# Patient Record
Sex: FEMALE | Race: ASIAN | NOT HISPANIC OR LATINO
[De-identification: names, ages, dates, MRNs, and addresses within clinical notes are randomized per-mention and may not be internally consistent; named-entity substitution may affect disease eponyms.]

---

## 2017-04-12 ENCOUNTER — RX ONLY (OUTPATIENT)
Age: 72
Setting detail: RX ONLY
End: 2017-04-12

## 2017-08-03 ENCOUNTER — APPOINTMENT (OUTPATIENT)
Dept: OTHER | Facility: IMAGING CENTER | Age: 72
End: 2017-08-03

## 2017-08-03 PROCEDURE — 97530 THERAPEUTIC ACTIVITIES: CPT | Performed by: FAMILY MEDICINE

## 2017-08-30 ENCOUNTER — APPOINTMENT (OUTPATIENT)
Dept: OTHER | Facility: IMAGING CENTER | Age: 72
End: 2017-08-30

## 2017-08-30 PROCEDURE — 97530 THERAPEUTIC ACTIVITIES: CPT | Performed by: FAMILY MEDICINE

## 2017-09-13 ENCOUNTER — APPOINTMENT (OUTPATIENT)
Dept: OTHER | Facility: IMAGING CENTER | Age: 72
End: 2017-09-13

## 2017-09-13 PROCEDURE — 97530 THERAPEUTIC ACTIVITIES: CPT | Performed by: FAMILY MEDICINE

## 2017-09-19 ENCOUNTER — HOSPITAL ENCOUNTER (OUTPATIENT)
Dept: HOSPITAL 8 - CFH | Age: 72
Discharge: HOME | End: 2017-09-19
Attending: INTERNAL MEDICINE
Payer: MEDICARE

## 2017-09-19 DIAGNOSIS — I10: Primary | ICD-10-CM

## 2017-09-19 PROCEDURE — 93306 TTE W/DOPPLER COMPLETE: CPT

## 2018-01-14 ENCOUNTER — HOSPITAL ENCOUNTER (OUTPATIENT)
Facility: MEDICAL CENTER | Age: 73
End: 2018-01-14
Attending: NURSE PRACTITIONER
Payer: MEDICARE

## 2018-01-14 ENCOUNTER — OFFICE VISIT (OUTPATIENT)
Dept: URGENT CARE | Facility: CLINIC | Age: 73
End: 2018-01-14
Payer: MEDICARE

## 2018-01-14 VITALS
WEIGHT: 119.4 LBS | BODY MASS INDEX: 24.07 KG/M2 | SYSTOLIC BLOOD PRESSURE: 130 MMHG | DIASTOLIC BLOOD PRESSURE: 90 MMHG | TEMPERATURE: 97.6 F | HEIGHT: 59 IN | RESPIRATION RATE: 14 BRPM | OXYGEN SATURATION: 97 % | HEART RATE: 90 BPM

## 2018-01-14 DIAGNOSIS — N30.01 ACUTE CYSTITIS WITH HEMATURIA: ICD-10-CM

## 2018-01-14 LAB
APPEARANCE UR: NORMAL
BILIRUB UR STRIP-MCNC: NEGATIVE MG/DL
COLOR UR AUTO: NORMAL
GLUCOSE UR STRIP.AUTO-MCNC: NEGATIVE MG/DL
KETONES UR STRIP.AUTO-MCNC: NEGATIVE MG/DL
LEUKOCYTE ESTERASE UR QL STRIP.AUTO: NORMAL
NITRITE UR QL STRIP.AUTO: POSITIVE
PH UR STRIP.AUTO: 7.5 [PH] (ref 5–8)
PROT UR QL STRIP: NEGATIVE MG/DL
RBC UR QL AUTO: NORMAL
SP GR UR STRIP.AUTO: 1
UROBILINOGEN UR STRIP-MCNC: NEGATIVE MG/DL

## 2018-01-14 PROCEDURE — 81002 URINALYSIS NONAUTO W/O SCOPE: CPT | Performed by: NURSE PRACTITIONER

## 2018-01-14 PROCEDURE — 87086 URINE CULTURE/COLONY COUNT: CPT

## 2018-01-14 PROCEDURE — 87186 SC STD MICRODIL/AGAR DIL: CPT

## 2018-01-14 PROCEDURE — 99214 OFFICE O/P EST MOD 30 MIN: CPT | Performed by: NURSE PRACTITIONER

## 2018-01-14 PROCEDURE — 87077 CULTURE AEROBIC IDENTIFY: CPT

## 2018-01-14 RX ORDER — NITROFURANTOIN 25; 75 MG/1; MG/1
100 CAPSULE ORAL 2 TIMES DAILY
Qty: 10 CAP | Refills: 0 | Status: SHIPPED | OUTPATIENT
Start: 2018-01-14 | End: 2018-01-19

## 2018-01-14 RX ORDER — AMOXICILLIN 500 MG/1
500 CAPSULE ORAL 3 TIMES DAILY
COMMUNITY
End: 2018-01-14

## 2018-01-14 NOTE — PROGRESS NOTES
Chief Complaint   Patient presents with   • Dysuria     x1day, burns to urinate, frequent urination, abdominal discomfort       HISTORY OF PRESENT ILLNESS: Patient is a 72 y.o. female who presents today due to three days of urinary burning, urgency, and frequency. Reports some associated pelvic pressure. Denies hematuria, fever, flank pain, N/V. Denies a history of pyelonephritis or kidney stones. Admits to a history of UTIs, this feels similar.     Patient Active Problem List    Diagnosis Date Noted   • Elevated blood sugar 06/27/2016   • Heart palpitations 11/03/2015   • Other chest pain 10/07/2015   • Glaucoma 03/17/2015   • Osteopenia 03/17/2015   • Back pain 09/23/2014   • History of shingles 09/23/2014   • Cervicalgia 07/02/2013   • GERD (gastroesophageal reflux disease) 03/22/2011   • Hyperlipidemia 03/22/2011   • Hypertension 03/22/2011       Allergies:Ciprofloxacin; Diclofenac; and Pcn [penicillins]    Current Outpatient Prescriptions Ordered in [facility]   Medication Sig Dispense Refill   • nitrofurantoin monohydr macro (MACROBID) 100 MG Cap Take 1 Cap by mouth 2 times a day for 5 days. 10 Cap 0   • latanoprost (XALATAN) 0.005 % SOLN Place 1 Drop in both eyes every evening. Lt eye daily      • Acetaminophen (TYLENOL PO) Take  by mouth.     • estradiol (ESTRACE) 1 MG TABS Take 1 mg by mouth every day.     • Coenzyme Q10 (CO Q 10 PO) Take  by mouth.     • Cholecalciferol (VITAMIN D-3 SUPER STRENGTH) 2000 UNIT TABS Take  by mouth.       No current Epic-ordered facility-administered medications on file.        Past Medical History:   Diagnosis Date   • Anesthesia     nausea   • Arthritis    • CATARACT     early stages   • Glaucoma     left eye   • Heart burn    • Hyperlipidemia    • Indigestion    • Pain    • Urinary bladder disorder        Social History   Substance Use Topics   • Smoking status: Never Smoker   • Smokeless tobacco: Never Used   • Alcohol use No       Family Status   Relation Status   • Mother  "   • Father      Family History   Problem Relation Age of Onset   • Heart Disease Mother      chf   • Cancer Father    • Stroke Father 70   • Diabetes Father        ROS:  Review of Systems   Constitutional: Negative for fever, chills, weight loss and malaise/fatigue.   HENT: Negative for ear pain, nosebleeds, congestion, sore throat and neck pain.    Eyes: Negative for vision changes.   Neuro: Negative for headache, sensory changes, weakness, seizure, LOC.   Cardiovascular: Negative for chest pain, palpitations, orthopnea and leg swelling.   Respiratory: Negative for cough, sputum production, shortness of breath and wheezing.   Gastrointestinal: Positive for lower abdominal pressure. Negative for abdominal pain, nausea, vomiting or diarrhea.   Genitourinary: Positive for dysuria, urgency and frequency. Negative for hematuria or flank pain.   Skin: Negative for rash, diaphoresis.     Exam:  Blood pressure 130/90, pulse 90, temperature 36.4 °C (97.6 °F), resp. rate 14, height 1.499 m (4' 11\"), weight 54.2 kg (119 lb 6.4 oz), SpO2 97 %.  General: well-nourished, well-developed female in NAD  Head: normocephalic, atraumatic  Eyes: PERRLA, no conjunctival injection, acuity grossly intact, lids normal.  Lymph: no cervical adenopathy. No supraclavicular adenopathy.   Neuro: alert and oriented. Cranial nerves 1-12 grossly intact. No sensory deficit.   Cardiovascular: regular rate and rhythm. No edema.  Pulmonary: no distress. Chest is symmetrical with respiration, no wheezes, crackles, or rhonchi.   Abdomen: soft, non-tender, no guarding, no hepatosplenomegaly. No CVA tenderness.   Musculoskeletal: no clubbing, appropriate muscle tone, gait is stable.  Skin: warm, dry, intact, no clubbing, no cyanosis, no rashes.   Psych: appropriate mood, affect, judgement.         Assessment/Plan:  1. Acute cystitis with hematuria  POCT Urinalysis    URINE CULTURE(NEW)    nitrofurantoin monohydr macro (MACROBID) 100 MG Cap "         Antibiotic as directed for suspected cysitis. Increase fluid intake. Urine sent for culture.   Supportive care, differential diagnoses, and indications for immediate follow-up discussed with patient.   Pathogenesis of diagnosis discussed including typical length and natural progression.   Instructed to return to clinic or nearest emergency department for any change in condition, further concerns, or worsening of symptoms.  Patient states understanding of the plan of care and discharge instructions.  Instructed to make an appointment, for follow up, with their primary care provider.        Please note that this dictation was created using voice recognition software. I have made every reasonable attempt to correct obvious errors, but I expect that there are errors of grammar and possibly content that I did not discover before finalizing the note.      HEVER Bettencourt.

## 2018-01-15 DIAGNOSIS — N30.01 ACUTE CYSTITIS WITH HEMATURIA: ICD-10-CM

## 2018-01-17 LAB
BACTERIA UR CULT: ABNORMAL
BACTERIA UR CULT: ABNORMAL
SIGNIFICANT IND 70042: ABNORMAL
SITE SITE: ABNORMAL
SOURCE SOURCE: ABNORMAL

## 2018-01-18 ENCOUNTER — TELEPHONE (OUTPATIENT)
Dept: URGENT CARE | Facility: PHYSICIAN GROUP | Age: 73
End: 2018-01-18

## 2018-01-18 NOTE — TELEPHONE ENCOUNTER
The patient was called for re-evaluation, urine culture positive for Escherichia coli, sensitive to Macrobid, she reports improvement, encouraged to call back to the clinic or return to clinic with any questions or concerns.

## 2018-02-25 ENCOUNTER — OFFICE VISIT (OUTPATIENT)
Dept: URGENT CARE | Facility: CLINIC | Age: 73
End: 2018-02-25
Payer: MEDICARE

## 2018-02-25 ENCOUNTER — HOSPITAL ENCOUNTER (OUTPATIENT)
Facility: MEDICAL CENTER | Age: 73
End: 2018-02-25
Attending: EMERGENCY MEDICINE
Payer: MEDICARE

## 2018-02-25 VITALS
SYSTOLIC BLOOD PRESSURE: 118 MMHG | HEART RATE: 90 BPM | RESPIRATION RATE: 14 BRPM | OXYGEN SATURATION: 98 % | WEIGHT: 119 LBS | BODY MASS INDEX: 23.99 KG/M2 | HEIGHT: 59 IN | DIASTOLIC BLOOD PRESSURE: 74 MMHG | TEMPERATURE: 97.5 F

## 2018-02-25 DIAGNOSIS — R30.0 DYSURIA: ICD-10-CM

## 2018-02-25 DIAGNOSIS — N30.01 ACUTE CYSTITIS WITH HEMATURIA: ICD-10-CM

## 2018-02-25 DIAGNOSIS — R82.90 ABNORMAL FINDING IN URINE: ICD-10-CM

## 2018-02-25 DIAGNOSIS — Z87.440 HISTORY OF RECURRENT UTI (URINARY TRACT INFECTION): ICD-10-CM

## 2018-02-25 LAB
APPEARANCE UR: NORMAL
BILIRUB UR STRIP-MCNC: NORMAL MG/DL
COLOR UR AUTO: NORMAL
GLUCOSE UR STRIP.AUTO-MCNC: NORMAL MG/DL
KETONES UR STRIP.AUTO-MCNC: NORMAL MG/DL
LEUKOCYTE ESTERASE UR QL STRIP.AUTO: NORMAL
NITRITE UR QL STRIP.AUTO: NORMAL
PH UR STRIP.AUTO: 6.5 [PH] (ref 5–8)
PROT UR QL STRIP: NORMAL MG/DL
RBC UR QL AUTO: NORMAL
SP GR UR STRIP.AUTO: 1
UROBILINOGEN UR STRIP-MCNC: 0.2 MG/DL

## 2018-02-25 PROCEDURE — 81002 URINALYSIS NONAUTO W/O SCOPE: CPT | Performed by: EMERGENCY MEDICINE

## 2018-02-25 PROCEDURE — 87086 URINE CULTURE/COLONY COUNT: CPT

## 2018-02-25 PROCEDURE — 87186 SC STD MICRODIL/AGAR DIL: CPT

## 2018-02-25 PROCEDURE — 87077 CULTURE AEROBIC IDENTIFY: CPT

## 2018-02-25 PROCEDURE — 99202 OFFICE O/P NEW SF 15 MIN: CPT | Performed by: EMERGENCY MEDICINE

## 2018-02-25 RX ORDER — CEFDINIR 300 MG/1
300 CAPSULE ORAL EVERY 12 HOURS
Qty: 10 CAP | Refills: 0 | Status: SHIPPED | OUTPATIENT
Start: 2018-02-25 | End: 2018-03-02

## 2018-02-25 ASSESSMENT — ENCOUNTER SYMPTOMS
CHANGE IN BOWEL HABIT: 0
FLANK PAIN: 0
VOMITING: 0
CHILLS: 0
NAUSEA: 0
ANOREXIA: 0
ABDOMINAL PAIN: 0
FEVER: 0
DIARRHEA: 0

## 2018-02-25 NOTE — PATIENT INSTRUCTIONS
You may also use over-the-counter phenazopyridine (AZO) as needed for urinary burning symptom relief. Use an oral probiotic daily, such as Culturelle, Align, or yogurt to reduce gastrointestinal symptoms.    Urinary Tract Infection  A urinary tract infection (UTI) can occur any place along the urinary tract. The tract includes the kidneys, ureters, bladder, and urethra. A type of germ called bacteria often causes a UTI. UTIs are often helped with antibiotic medicine.   HOME CARE   · If given, take antibiotics as told by your doctor. Finish them even if you start to feel better.  · Drink enough fluids to keep your pee (urine) clear or pale yellow.  · Avoid tea, drinks with caffeine, and bubbly (carbonated) drinks.  · Pee often. Avoid holding your pee in for a long time.  · Pee before and after having sex (intercourse).  · Wipe from front to back after you poop (bowel movement) if you are a woman. Use each tissue only once.  GET HELP RIGHT AWAY IF:   · You have back pain.  · You have lower belly (abdominal) pain.  · You have chills.  · You feel sick to your stomach (nauseous).  · You throw up (vomit).  · Your burning or discomfort with peeing does not go away.  · You have a fever.  · Your symptoms are not better in 3 days.  MAKE SURE YOU:   · Understand these instructions.  · Will watch your condition.  · Will get help right away if you are not doing well or get worse.     This information is not intended to replace advice given to you by your health care provider. Make sure you discuss any questions you have with your health care provider.     Document Released: 06/05/2009 Document Revised: 01/08/2016 Document Reviewed: 07/18/2013  [employer] Patient Education ©2016 [employer]

## 2018-02-25 NOTE — PROGRESS NOTES
Subjective:      Radha Kerr is a 72 y.o. female who presents with UTI (x a couple of days)            UTI   This is a recurrent problem. Episode onset: 3 days. The problem occurs daily. The problem has been unchanged. Associated symptoms include urinary symptoms. Pertinent negatives include no abdominal pain, anorexia, change in bowel habit, chills, fever, nausea, rash or vomiting. Nothing aggravates the symptoms. She has tried drinking for the symptoms. The treatment provided no relief.   Scheduled for urology visit due to recurrent UTI.    Review of Systems   Constitutional: Negative for chills and fever.   Gastrointestinal: Negative for abdominal pain, anorexia, change in bowel habit, diarrhea, nausea and vomiting.   Genitourinary: Positive for dysuria, frequency and urgency. Negative for flank pain and hematuria.        No vaginal discharge or bleeding.   Skin: Negative for rash.     PMH:  has a past medical history of Anesthesia; Arthritis; CATARACT; Glaucoma; Heart burn; Hyperlipidemia; Indigestion; Pain; and Urinary bladder disorder.  MEDS:   Current Outpatient Prescriptions:   •  D-MANNOSE PO, Take  by mouth., Disp: , Rfl:   •  cefdinir (OMNICEF) 300 MG Cap, Take 1 Cap by mouth every 12 hours for 5 days., Disp: 10 Cap, Rfl: 0  •  Acetaminophen (TYLENOL PO), Take  by mouth., Disp: , Rfl:   •  estradiol (ESTRACE) 1 MG TABS, Take 1 mg by mouth every day., Disp: , Rfl:   •  Coenzyme Q10 (CO Q 10 PO), Take  by mouth., Disp: , Rfl:   •  latanoprost (XALATAN) 0.005 % SOLN, Place 1 Drop in both eyes every evening. Lt eye daily , Disp: , Rfl:   •  Cholecalciferol (VITAMIN D-3 SUPER STRENGTH) 2000 UNIT TABS, Take  by mouth., Disp: , Rfl:   ALLERGIES:   Allergies   Allergen Reactions   • Ciprofloxacin    • Diclofenac    • Pcn [Penicillins]      SURGHX:   Past Surgical History:   Procedure Laterality Date   • COLONOSCOPY  12/17/13    2 polyps   • KNEE ARTHROSCOPY  1/9/2013    Performed by Seun Giles,  "M.D. at SURGERY [facility]   • MEDIAL MENISCECTOMY  1/9/2013    Performed by Seun Giles M.D. at SURGERY [facility]   • KNEE ARTHROSCOPY  7/13/2010    Performed by HAL WRIGHT at SURGERY SAME DAY [facility]   • MEDIAL MENISCECTOMY  7/13/2010    Performed by HAL WRIGHT at SURGERY SAME DAY [facility]   • ABDOMINAL HYSTERECTOMY TOTAL      gall bladder     SOCHX:  reports that she has never smoked. She has never used smokeless tobacco. She reports that she does not drink alcohol or use drugs.  FH: family history includes Cancer in her father; Diabetes in her father; Heart Disease in her mother; Stroke (age of onset: 70) in her father.       Objective:     /74   Pulse 90   Temp 36.4 °C (97.5 °F)   Resp 14   Ht 1.499 m (4' 11\")   Wt 54 kg (119 lb)   SpO2 98%   BMI 24.04 kg/m²      Physical Exam   Constitutional: She appears well-developed and well-nourished. She is cooperative. She does not have a sickly appearance. She does not appear ill. No distress.   Cardiovascular: Normal rate, regular rhythm and normal heart sounds.    Pulmonary/Chest: Effort normal and breath sounds normal.   Abdominal: Soft. She exhibits no distension. There is no tenderness. There is no CVA tenderness.   Neurological: She is alert.   Skin: Skin is warm and dry.   Psychiatric: She has a normal mood and affect.          Prior urine culture Escherichia coli resistant to ampicillin, trimethoprim/sulfamethoxazole.     Assessment/Plan:     1. Acute cystitis with hematuria  - cefdinir (OMNICEF) 300 MG Cap; Take 1 Cap by mouth every 12 hours for 5 days.  Dispense: 10 Cap; Refill: 0    2. Abnormal finding in urine   - Urine Culture; Future    3. Dysuria  Positive LE, positive blood- POCT Urinalysis    4. History of recurrent UTI (urinary tract infection)    "

## 2018-02-28 ENCOUNTER — TELEPHONE (OUTPATIENT)
Dept: URGENT CARE | Facility: CLINIC | Age: 73
End: 2018-02-28

## 2018-02-28 NOTE — TELEPHONE ENCOUNTER
Please notify patient of positive urine culture; UTI should resolve with current medication.  F/U PCP if not resolving.

## 2018-03-05 ENCOUNTER — TELEPHONE (OUTPATIENT)
Dept: URGENT CARE | Facility: CLINIC | Age: 73
End: 2018-03-05

## 2018-03-21 ENCOUNTER — HOSPITAL ENCOUNTER (OUTPATIENT)
Dept: LAB | Facility: MEDICAL CENTER | Age: 73
End: 2018-03-21
Attending: GENERAL PRACTICE
Payer: MEDICARE

## 2018-03-21 LAB
25(OH)D3 SERPL-MCNC: 48 NG/ML (ref 30–100)
ALBUMIN SERPL BCP-MCNC: 4.3 G/DL (ref 3.2–4.9)
ALBUMIN/GLOB SERPL: 1.5 G/DL
ALP SERPL-CCNC: 66 U/L (ref 30–99)
ALT SERPL-CCNC: 26 U/L (ref 2–50)
ANION GAP SERPL CALC-SCNC: 7 MMOL/L (ref 0–11.9)
APPEARANCE UR: ABNORMAL
AST SERPL-CCNC: 24 U/L (ref 12–45)
BACTERIA #/AREA URNS HPF: NEGATIVE /HPF
BASOPHILS # BLD AUTO: 0.9 % (ref 0–1.8)
BASOPHILS # BLD: 0.05 K/UL (ref 0–0.12)
BILIRUB SERPL-MCNC: 0.6 MG/DL (ref 0.1–1.5)
BILIRUB UR QL STRIP.AUTO: NEGATIVE
BUN SERPL-MCNC: 17 MG/DL (ref 8–22)
CALCIUM SERPL-MCNC: 9.8 MG/DL (ref 8.5–10.5)
CHLORIDE SERPL-SCNC: 105 MMOL/L (ref 96–112)
CHOLEST SERPL-MCNC: 205 MG/DL (ref 100–199)
CO2 SERPL-SCNC: 28 MMOL/L (ref 20–33)
COLOR UR: YELLOW
CORTIS SERPL-MCNC: 9.8 UG/DL (ref 0–23)
CREAT SERPL-MCNC: 0.7 MG/DL (ref 0.5–1.4)
CRP SERPL HS-MCNC: 1.5 MG/L (ref 0–7.5)
CULTURE IF INDICATED INDCX: YES UA CULTURE
EOSINOPHIL # BLD AUTO: 0.17 K/UL (ref 0–0.51)
EOSINOPHIL NFR BLD: 3 % (ref 0–6.9)
EPI CELLS #/AREA URNS HPF: NEGATIVE /HPF
ERYTHROCYTE [DISTWIDTH] IN BLOOD BY AUTOMATED COUNT: 44.8 FL (ref 35.9–50)
ERYTHROCYTE [SEDIMENTATION RATE] IN BLOOD BY WESTERGREN METHOD: 14 MM/HOUR (ref 0–30)
EST. AVERAGE GLUCOSE BLD GHB EST-MCNC: 120 MG/DL
ESTRADIOL SERPL-MCNC: 20 PG/ML
GLOBULIN SER CALC-MCNC: 2.9 G/DL (ref 1.9–3.5)
GLUCOSE SERPL-MCNC: 90 MG/DL (ref 65–99)
GLUCOSE UR STRIP.AUTO-MCNC: NEGATIVE MG/DL
HBA1C MFR BLD: 5.8 % (ref 0–5.6)
HCT VFR BLD AUTO: 42.1 % (ref 37–47)
HCYS SERPL-SCNC: 10.29 UMOL/L
HDLC SERPL-MCNC: 79 MG/DL
HGB BLD-MCNC: 13.9 G/DL (ref 12–16)
HYALINE CASTS #/AREA URNS LPF: ABNORMAL /LPF
IMM GRANULOCYTES # BLD AUTO: 0.01 K/UL (ref 0–0.11)
IMM GRANULOCYTES NFR BLD AUTO: 0.2 % (ref 0–0.9)
IRON SATN MFR SERPL: 37 % (ref 15–55)
IRON SERPL-MCNC: 133 UG/DL (ref 40–170)
KETONES UR STRIP.AUTO-MCNC: NEGATIVE MG/DL
LDLC SERPL CALC-MCNC: 111 MG/DL
LEUKOCYTE ESTERASE UR QL STRIP.AUTO: ABNORMAL
LYMPHOCYTES # BLD AUTO: 1.77 K/UL (ref 1–4.8)
LYMPHOCYTES NFR BLD: 30.7 % (ref 22–41)
MCH RBC QN AUTO: 30.3 PG (ref 27–33)
MCHC RBC AUTO-ENTMCNC: 33 G/DL (ref 33.6–35)
MCV RBC AUTO: 91.9 FL (ref 81.4–97.8)
MICRO URNS: ABNORMAL
MONOCYTES # BLD AUTO: 0.43 K/UL (ref 0–0.85)
MONOCYTES NFR BLD AUTO: 7.5 % (ref 0–13.4)
NEUTROPHILS # BLD AUTO: 3.33 K/UL (ref 2–7.15)
NEUTROPHILS NFR BLD: 57.7 % (ref 44–72)
NITRITE UR QL STRIP.AUTO: NEGATIVE
NRBC # BLD AUTO: 0 K/UL
NRBC BLD-RTO: 0 /100 WBC
PH UR STRIP.AUTO: 8.5 [PH]
PLATELET # BLD AUTO: 335 K/UL (ref 164–446)
PMV BLD AUTO: 10.1 FL (ref 9–12.9)
POTASSIUM SERPL-SCNC: 3.7 MMOL/L (ref 3.6–5.5)
PROGEST SERPL-MCNC: 0.12 NG/ML
PROT SERPL-MCNC: 7.2 G/DL (ref 6–8.2)
PROT UR QL STRIP: NEGATIVE MG/DL
RBC # BLD AUTO: 4.58 M/UL (ref 4.2–5.4)
RBC # URNS HPF: ABNORMAL /HPF
RBC UR QL AUTO: NEGATIVE
SODIUM SERPL-SCNC: 140 MMOL/L (ref 135–145)
SP GR UR STRIP.AUTO: 1.02
T3 SERPL-MCNC: 108.1 NG/DL (ref 60–181)
T3FREE SERPL-MCNC: 3.42 PG/ML (ref 2.4–4.2)
T4 FREE SERPL-MCNC: 0.78 NG/DL (ref 0.53–1.43)
T4 SERPL-MCNC: 6.4 UG/DL (ref 4–12)
TIBC SERPL-MCNC: 361 UG/DL (ref 250–450)
TRIGL SERPL-MCNC: 76 MG/DL (ref 0–149)
TSH SERPL DL<=0.005 MIU/L-ACNC: 1.38 UIU/ML (ref 0.38–5.33)
UROBILINOGEN UR STRIP.AUTO-MCNC: 0.2 MG/DL
VIT B12 SERPL-MCNC: 714 PG/ML (ref 211–911)
WBC # BLD AUTO: 5.8 K/UL (ref 4.8–10.8)
WBC #/AREA URNS HPF: ABNORMAL /HPF

## 2018-03-21 PROCEDURE — 84480 ASSAY TRIIODOTHYRONINE (T3): CPT

## 2018-03-21 PROCEDURE — 83550 IRON BINDING TEST: CPT

## 2018-03-21 PROCEDURE — 36415 COLL VENOUS BLD VENIPUNCTURE: CPT

## 2018-03-21 PROCEDURE — 84144 ASSAY OF PROGESTERONE: CPT

## 2018-03-21 PROCEDURE — 85652 RBC SED RATE AUTOMATED: CPT

## 2018-03-21 PROCEDURE — 83036 HEMOGLOBIN GLYCOSYLATED A1C: CPT

## 2018-03-21 PROCEDURE — 86141 C-REACTIVE PROTEIN HS: CPT

## 2018-03-21 PROCEDURE — 82670 ASSAY OF TOTAL ESTRADIOL: CPT

## 2018-03-21 PROCEDURE — 80061 LIPID PANEL: CPT

## 2018-03-21 PROCEDURE — 84443 ASSAY THYROID STIM HORMONE: CPT

## 2018-03-21 PROCEDURE — 83525 ASSAY OF INSULIN: CPT

## 2018-03-21 PROCEDURE — 81001 URINALYSIS AUTO W/SCOPE: CPT

## 2018-03-21 PROCEDURE — 82306 VITAMIN D 25 HYDROXY: CPT

## 2018-03-21 PROCEDURE — 85025 COMPLETE CBC W/AUTO DIFF WBC: CPT

## 2018-03-21 PROCEDURE — 82607 VITAMIN B-12: CPT

## 2018-03-21 PROCEDURE — 87086 URINE CULTURE/COLONY COUNT: CPT

## 2018-03-21 PROCEDURE — 83540 ASSAY OF IRON: CPT

## 2018-03-21 PROCEDURE — 84403 ASSAY OF TOTAL TESTOSTERONE: CPT

## 2018-03-21 PROCEDURE — 80053 COMPREHEN METABOLIC PANEL: CPT

## 2018-03-21 PROCEDURE — 84270 ASSAY OF SEX HORMONE GLOBUL: CPT

## 2018-03-21 PROCEDURE — 82533 TOTAL CORTISOL: CPT

## 2018-03-21 PROCEDURE — 83090 ASSAY OF HOMOCYSTEINE: CPT

## 2018-03-21 PROCEDURE — 84481 FREE ASSAY (FT-3): CPT

## 2018-03-21 PROCEDURE — 84439 ASSAY OF FREE THYROXINE: CPT

## 2018-03-22 LAB — INSULIN P FAST SERPL-ACNC: 6 UIU/ML (ref 3–19)

## 2018-03-23 LAB
BACTERIA UR CULT: NORMAL
SIGNIFICANT IND 70042: NORMAL
SITE SITE: NORMAL
SOURCE SOURCE: NORMAL

## 2018-03-24 LAB
SHBG SERPL-SCNC: 84 NMOL/L (ref 30–135)
TESTOST FREE SERPL-MCNC: 0.7 PG/ML (ref 0.6–3.8)
TESTOST SERPL-MCNC: 8 NG/DL (ref 5–32)

## 2018-03-29 ENCOUNTER — HOSPITAL ENCOUNTER (EMERGENCY)
Facility: MEDICAL CENTER | Age: 73
End: 2018-03-29
Attending: EMERGENCY MEDICINE
Payer: MEDICARE

## 2018-03-29 VITALS
TEMPERATURE: 96.8 F | RESPIRATION RATE: 16 BRPM | BODY MASS INDEX: 23.6 KG/M2 | WEIGHT: 117.06 LBS | HEIGHT: 59 IN | SYSTOLIC BLOOD PRESSURE: 180 MMHG | HEART RATE: 79 BPM | DIASTOLIC BLOOD PRESSURE: 78 MMHG | OXYGEN SATURATION: 97 %

## 2018-03-29 DIAGNOSIS — Z00.00 NORMAL PHYSICAL EXAM: ICD-10-CM

## 2018-03-29 DIAGNOSIS — Z71.1 WORRIED WELL: ICD-10-CM

## 2018-03-29 LAB — EKG IMPRESSION: NORMAL

## 2018-03-29 PROCEDURE — 99284 EMERGENCY DEPT VISIT MOD MDM: CPT

## 2018-03-29 PROCEDURE — 93005 ELECTROCARDIOGRAM TRACING: CPT | Performed by: EMERGENCY MEDICINE

## 2018-03-29 PROCEDURE — 93005 ELECTROCARDIOGRAM TRACING: CPT

## 2018-03-29 RX ORDER — ASPIRIN 81 MG/1
81 TABLET, CHEWABLE ORAL DAILY
COMMUNITY

## 2018-03-29 NOTE — ED PROVIDER NOTES
ED Provider Note    CHIEF COMPLAINT  Chief Complaint   Patient presents with   • Palpitations       HPI  Erikrey Mir Kerr is a 72 y.o. female who presents to the emergency department with concerns for possible palpitations. The patient says that she was shopping at [email or web address] today when she noticed that her apple watch said that her heart rate was going fast in the 120 to 1:30 range. The patient did not have any symptoms. She did not have any feelings of palpitations or racing heart rate. She denied any shortness of breath or lightheadedness. No chest pain. The patient says that she does not know how to take her pulse and therefore did not try to check her pulse to see the correlated with what the apple registered. She has been wearing the apple watch on a fairly regular basis. However she started to loosen the watch because it started to feel too tight on her wrists and she is worried that she was wearing the watch too tightly.    REVIEW OF SYSTEMS  See HPI for further details. All other systems are negative.     PAST MEDICAL HISTORY  Past Medical History:   Diagnosis Date   • Anesthesia     nausea   • Arthritis    • CATARACT     early stages   • Glaucoma     left eye   • Heart burn    • Hyperlipidemia    • Indigestion    • Pain    • Urinary bladder disorder        FAMILY HISTORY  Family History   Problem Relation Age of Onset   • Heart Disease Mother      chf   • Cancer Father    • Stroke Father 70   • Diabetes Father        SOCIAL HISTORY  Social History     Social History   • Marital status:      Spouse name: Man   • Number of children: 1   • Years of education: N/A     Occupational History   • [employer]     Social History Main Topics   • Smoking status: Never Smoker   • Smokeless tobacco: Never Used   • Alcohol use No   • Drug use: No   • Sexual activity: Not on file      Comment:  works for [employer].      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  Came from [facility] > 40 years ago       SURGICAL HISTORY  Past Surgical History:   Procedure Laterality Date   • COLONOSCOPY  12/17/13    2 polyps   • KNEE ARTHROSCOPY  1/9/2013    Performed by Seun Giles M.D. at SURGERY [facility] ORS   • MEDIAL MENISCECTOMY  1/9/2013    Performed by Seun Giles M.D. at SURGERY [facility] ORS   • KNEE ARTHROSCOPY  7/13/2010    Performed by HAL WRIGHT at SURGERY SAME DAY [facility] ORS   • MEDIAL MENISCECTOMY  7/13/2010    Performed by HAL WRIGHT at SURGERY SAME DAY [facility] ORS   • ABDOMINAL HYSTERECTOMY TOTAL      gall bladder       CURRENT MEDICATIONS  Home Medications     Reviewed by Bailey Elizabeth R.N. (Registered Nurse) on 03/29/18 at 1521  Med List Status: Partial   Medication Last Dose Status   Acetaminophen (TYLENOL PO) 6/27/2016 Active   aspirin (ASA) 81 MG Chew Tab chewable tablet  Active   Cholecalciferol (VITAMIN D-3 SUPER STRENGTH) 2000 UNIT TABS 3/29/2018 Active   latanoprost (XALATAN) 0.005 % SOLN 3/29/2018 Active                ALLERGIES  Allergies   Allergen Reactions   • Ciprofloxacin    • Diclofenac    • Pcn [Penicillins]        PHYSICAL EXAM  VITAL SIGNS: BP (!) 180/78   Pulse 81   Temp 36 °C (96.8 °F) (Temporal)   Resp 17   Ht 1.499 m (4' 11\")   Wt 53.1 kg (117 lb 1 oz)   SpO2 100%   BMI 23.64 kg/m²   Constitutional: Well developed, Well nourished, No acute distress, Non-toxic appearance.   HENT: Normocephalic, Atraumatic, Bilateral external ears normal, Oropharynx moist, No oral exudates, Nose normal.   Eyes: PERRLA, EOMI, Conjunctiva normal, No discharge.   Neck: Normal range of motion, No tenderness, Supple, No stridor.   Cardiovascular: Regular rate and rhythm, no audible murmur  Thorax & Lungs: Clear to auscultation bilaterally. No rales, rhonchi, or wheezing  Abdomen: Bowel sounds normal, Soft, No tenderness, No masses, No pulsatile masses.   Skin: Warm, Dry, No erythema, No rash.   Back: No " tenderness, No CVA tenderness.   Extremities: Intact distal pulses, No tenderness, No cyanosis, No clubbing.  Neurologic: Alert & oriented x 3, Normal motor function, Normal sensory function, No focal deficits noted.     EKG      RADIOLOGY/PROCEDURES  Results for orders placed or performed during the hospital encounter of 18   EKG (ER)   Result Value Ref Range    Report       [facility] Emergency Dept.    Test Date:  2018  Pt Name:    AGUS QUESADA                   Department: ER  MRN:        [id number]                      Room:  Gender:     Female                       Technician: [id number]  :        1945                   Requested By:ER TRIAGE PROTOCOL  Order #:    [id number]                    Reading MD: ASHWINI MCKOY MD    Measurements  Intervals                                Axis  Rate:       82                           P:          54  UT:         160                          QRS:        7  QRSD:       82                           T:          39  QT:         384  QTc:        449    Interpretive Statements  SINUS RHYTHM  EARLY PRECORDIAL R/S TRANSITION  Compared to ECG 10/31/2015 23:52:19  No significant changes    Electronically Signed On 3- 15:21:52 PDT by ASHWINI MCKOY MD         COURSE & MEDICAL DECISION MAKING  Pertinent Labs & Imaging studies reviewed. (See chart for details)    The patient presents today with concerns for palpitations. Here the patient is in normal sinus rhythm. EKG is unremarkable. The patient was apparently recently seen by her cardiologist. She underwent evaluation with a Holter monitor showed that apparently she had an extra beat occasionally but no concerning arrhythmias. Here the patient is in normal sinus rhythm with a normal heart rate. The patient was asymptomatic throughout the day today. Only concern was that the watch was reading a high number for heart rate. Today on exam she is wearing an apple lodged in the right wrist. It is  very loosely applied. I suspect that the watch was not reading accurately. The patient and her  think that this indeed may be the case. I have instructed the patient to wear the watch more firmly applied to the wrist. In addition we have taught the patient how to measure her pulse by palpating the radial pulse and counting the beats per minute. The patient will use this to see if an elevated heart rate on her apple watch correlates with an increased pulse. Should she have any irregular heart rhythm, increased heart rate above 100, or if she has chest pain or shortness of breath the patient will come to the emergency department for evaluation.    I do not feel that further evaluation would be beneficial today. Patient discharged home in stable condition.    FINAL IMPRESSION  1. Normal physical exam    2. Worried well              Electronically signed by: Tian Zurita, 3/29/2018 3:34 PM

## 2018-03-29 NOTE — DISCHARGE INSTRUCTIONS
Normal Exam, Adult  You were seen and examined today in our facility. Our caregiver found nothing wrong on the exam. If testing was done such as lab work or x-rays, they did not indicate enough wrong to suggest that treatment should be given. The caregiver then must decide after testing is finished if your concern is a physical problem or illness that needs treatment. Today no treatable problem was found. Even if reassurance was given, if you feel you are getting worse when you get home make sure you call back or return to our department.  For the protection of your privacy, test results can not be given over the phone. Make sure you receive the results of your test. Ask as to how these results are to be obtained if you have not been informed. It is your responsibility to obtain your test results.  Your condition can change over time. Sometimes it takes more than one visit to determine the cause of your symptoms. It is important that you monitor your condition for any changes.  SEEK IMMEDIATE MEDICAL CARE IF:  · You develop an oral temperature above 102° F (38.9° C), which lasts for more than 2 days, not controlled by medications. Only take over-the-counter or prescription medicines for pain, discomfort, or fever as directed by your caregiver.   · You develop a loss of appetite or start throwing up (vomiting).   · You develop a rash, cough, belly (abdominal) pain, earache, headache, or develop pain in neck, muscles, or joints.   · The problem or problems which brought you to our facility become worse or are a cause of more concern.   If we have told you today your exam and tests are normal, and a short while later you feel this is not right, please seek medical attention so you may be rechecked.  Document Released: 04/01/2002 Document Revised: 03/11/2013 Document Reviewed: 07/24/2009  [employer]® Patient Information ©2013 [employer].

## 2018-03-29 NOTE — ED TRIAGE NOTES
Pt ambulatory to triage c/o palpitations while walking & then again at rest.  Pt denies chest pain/SOB or any other symptoms.  States her HR was 130's during episode with no cardiac hx.  HR 80's in triage.   EKG done.

## 2018-05-10 ENCOUNTER — OFFICE VISIT (OUTPATIENT)
Dept: MEDICAL GROUP | Facility: MEDICAL CENTER | Age: 73
End: 2018-05-10
Payer: MEDICARE

## 2018-05-10 VITALS
HEIGHT: 59 IN | RESPIRATION RATE: 16 BRPM | BODY MASS INDEX: 23.56 KG/M2 | OXYGEN SATURATION: 98 % | DIASTOLIC BLOOD PRESSURE: 82 MMHG | HEART RATE: 69 BPM | SYSTOLIC BLOOD PRESSURE: 122 MMHG | TEMPERATURE: 98.3 F | WEIGHT: 116.84 LBS

## 2018-05-10 DIAGNOSIS — E78.5 DYSLIPIDEMIA: ICD-10-CM

## 2018-05-10 DIAGNOSIS — M85.80 OSTEOPENIA, UNSPECIFIED LOCATION: ICD-10-CM

## 2018-05-10 DIAGNOSIS — Z12.39 SCREENING FOR BREAST CANCER: ICD-10-CM

## 2018-05-10 DIAGNOSIS — M25.511 ACUTE PAIN OF RIGHT SHOULDER: ICD-10-CM

## 2018-05-10 DIAGNOSIS — Z12.31 SCREENING MAMMOGRAM, ENCOUNTER FOR: ICD-10-CM

## 2018-05-10 DIAGNOSIS — R73.9 ELEVATED BLOOD SUGAR: ICD-10-CM

## 2018-05-10 PROCEDURE — 99214 OFFICE O/P EST MOD 30 MIN: CPT | Performed by: FAMILY MEDICINE

## 2018-05-10 RX ORDER — DORZOLAMIDE HYDROCHLORIDE AND TIMOLOL MALEATE 20; 5 MG/ML; MG/ML
1 SOLUTION/ DROPS OPHTHALMIC 2 TIMES DAILY
COMMUNITY
End: 2019-03-27

## 2018-05-10 ASSESSMENT — PATIENT HEALTH QUESTIONNAIRE - PHQ9: CLINICAL INTERPRETATION OF PHQ2 SCORE: 0

## 2018-05-10 NOTE — PROGRESS NOTES
[facility]  Progress Note  Established Patient    Subjective:   Radha Kerr is a 72 y.o. female here today with a chief complaint of elevated cholesterol. The patient is alone.     Dyslipidemia  The patient has dyslipidemia with a ten-year ACC risk of 10%. She is maintained on baby aspirin. She declines statin therapy.    Osteopenia  Patient has osteopenia but her 2014 DEXA scan FRAX score does not warrant bisphosphonate.    Elevated blood sugar  Patient has a history of elevated blood sugar. Her most recent fasting glucose was normal, however, her hemoglobin A1c was slightly elevated. The patient does try to eat well and exercise.    Acute pain of right shoulder  The patient describes 2 weeks of 1-2 out of 10 severity, atraumatic right lateral shoulder pain worse with reaching. She has tried salon pas and tiger balm without benefit. She states it is getting better.      Current Outpatient Prescriptions on File Prior to Visit   Medication Sig Dispense Refill   • aspirin (ASA) 81 MG Chew Tab chewable tablet Take 81 mg by mouth every day.     • latanoprost (XALATAN) 0.005 % SOLN Place 1 Drop in both eyes every evening. Lt eye daily      • Cholecalciferol (VITAMIN D-3 SUPER STRENGTH) 2000 UNIT TABS Take  by mouth.     • Acetaminophen (TYLENOL PO) Take  by mouth.       No current facility-administered medications on file prior to visit.        Past Medical History:   Diagnosis Date   • Anesthesia     nausea   • Arthritis    • CATARACT     early stages   • GERD (gastroesophageal reflux disease)    • Glaucoma     left eye   • Hyperlipidemia    • Urinary bladder disorder        Allergies: Ciprofloxacin; Diclofenac; Myrbetriq [mirabegron]; Other drug; and Pcn [penicillins]    Surgical History:  has a past surgical history that includes abdominal hysterectomy total; knee arthroscopy (7/13/2010); medial meniscectomy (7/13/2010); knee arthroscopy (1/9/2013); medial meniscectomy (1/9/2013); and colonoscopy  "(12/17/13).    Family History: family history includes Cancer in her father; Diabetes in her father; Heart Disease in her mother; Stroke (age of onset: 70) in her father.    Social History:  reports that she has never smoked. She has never used smokeless tobacco. She reports that she does not drink alcohol or use drugs.    ROS: no CP, nausea.        Objective:     Vitals:    05/10/18 0919   BP: 122/82   Pulse: 69   Resp: 16   Temp: 36.8 °C (98.3 °F)   SpO2: 98%   Weight: 53 kg (116 lb 13.5 oz)   Height: 1.499 m (4' 11\")       Physical Exam:  General: alert in no apparent distress.   Cardio: regular rate and rhythm, no murmurs, rubs or gallops. 2+ radial pulses bilaterally.  Neuro: Sensation intact in bilateral upper extremities.  Resp: CTAB no w/r/r.   MSK: No tenderness to palpation of the right shoulder. No pain or weakness on internal rotation, external rotation or empty can testing of right shoulder.        Assessment and Plan:     1. Acute pain of right shoulder  Exam normal, 2010 X ray showing calcific tendinitis. Atraumatic. I don't think there is any need for an x-ray at the current time given the atraumatic and resolving nature of the complaint.  - continue [place] Balm and Salonpas.   - call if not resolved in 6 weeks as we would then pursue imaging.     2. Dyslipidemia  Recommended statin, patient declines.  - continue aspirin 81 mg daily.    3. Elevated blood sugar  Fasting blood sugar normal, A1c slightly elevated. Fasting blood sugars more sensitive for diabetes.  -Discussed diet and exercise.    4. Osteopenia, unspecified location  No indication for bisphosphonate.  - Continue calcium rich diet.        Followup: Return in about 6 months (around 11/10/2018), or if symptoms worsen or fail to improve, for Wellness Visit, Long.         "

## 2018-05-10 NOTE — ASSESSMENT & PLAN NOTE
The patient has dyslipidemia with a ten-year ACC risk of 10%. She is maintained on baby aspirin. She declines statin therapy.

## 2018-05-10 NOTE — ASSESSMENT & PLAN NOTE
The patient describes 2 weeks of 1-2 out of 10 severity, atraumatic right lateral shoulder pain worse with reaching. She has tried salon pas and tiger balm without benefit. She states it is getting better.

## 2018-05-10 NOTE — ASSESSMENT & PLAN NOTE
Patient has a history of elevated blood sugar. Her most recent fasting glucose was normal, however, her hemoglobin A1c was slightly elevated. The patient does try to eat well and exercise.

## 2018-05-16 NOTE — PROGRESS NOTES
Chief Complaint   Patient presents with   • Shoulder Pain     right side for about 2 weeks, worse today     Radha Kerr is a 72 y.o. female who usually sees Christian Green M.D. presents today to establish care at [facility] and to discuss right shoulder pain.    HPI:  Acute pain of right shoulder  Patient works as a  in [employer] and has been needing repetitive motions at right hand as she is right handed.  She was using Salonpas and tiger balm and has been able to     Dyslipidemia  Patient is        Current medicines (including changes today)  Current Outpatient Prescriptions   Medication Sig Dispense Refill   • ibuprofen (MOTRIN) 200 MG Tab Take 200 mg by mouth every 6 hours as needed.     • raNITidine (ZANTAC) 150 MG Tab Take 150 mg by mouth 2 times a day.     • Non Formulary Request Strontium, MWF     • Non Formulary Request alfaecal plus for bone strength     • dorzolamide-timolol (COSOPT) 22.3-6.8 MG/ML Solution Place 1 Drop in right eye 2 times a day.     • VITAMINS B1 B6 B12 PO Take  by mouth.     • D-MANNOSE PO Take 500 mg by mouth 2 Times a Day.     • aspirin (ASA) 81 MG Chew Tab chewable tablet Take 81 mg by mouth every day.     • latanoprost (XALATAN) 0.005 % SOLN Place 1 Drop in both eyes every evening. Lt eye daily      • Cholecalciferol (VITAMIN D-3 SUPER STRENGTH) 2000 UNIT TABS Take  by mouth.     • Acetaminophen (TYLENOL PO) Take  by mouth.       No current facility-administered medications for this visit.      She  has a past medical history of Anesthesia; Arthritis; CATARACT; GERD (gastroesophageal reflux disease); Glaucoma; Hyperlipidemia; and Urinary bladder disorder.  She  has a past surgical history that includes abdominal hysterectomy total; knee arthroscopy (7/13/2010); medial meniscectomy (7/13/2010); knee arthroscopy (1/9/2013); medial meniscectomy (1/9/2013); and colonoscopy (12/17/13).  Social History   Substance Use Topics   • Smoking status: Never Smoker   • Smokeless  tobacco: Never Used   • Alcohol use No     Family History   Problem Relation Age of Onset   • Heart Disease Mother      chf   • Cancer Father    • Stroke Father 70   • Diabetes Father      Family Status   Relation Status   • Mother    • Father      Social History     Social History Narrative    Works in Retail.  Son - Rambo.  Came from [facility] > 40 years ago     ROS  Constitutional: Negative for fever, chills, weight loss and malaise/fatigue.   HENT: Negative for ear pain, nosebleeds, congestion, sore throat and neck pain.    Eyes: Negative for blurred vision.   Respiratory: Negative for cough, sputum production, shortness of breath and wheezing.    Cardiovascular: Negative for chest pain, palpitations, orthopnea and leg swelling.   Gastrointestinal: Negative for heartburn, nausea, vomiting and abdominal pain.   Genitourinary: Negative for dysuria, urgency and frequency.   Musculoskeletal: Negative for myalgias, back pain, Positive for right joint pain.   Skin: Negative for rash and itching.   Neurological: Negative for dizziness, tingling, tremors, sensory change, focal weakness and headaches.   Endo/Heme/Allergies: Does not bruise/bleed easily.   Psychiatric/Behavioral: Negative for depression, anxiety, or memory loss.     All other systems reviewed and are negative except as in HPI.    Labs reviewed with patient:  Lab Results   Component Value Date/Time    WBC 5.8 2018 06:13 AM    WBC 6.9 2009 03:59 PM    RBC 4.58 2018 06:13 AM    RBC 4.63 2009 03:59 PM    HEMOGLOBIN 13.9 2018 06:13 AM    HEMATOCRIT 42.1 2018 06:13 AM    MCV 91.9 2018 06:13 AM    MCV 88 2009 03:59 PM    MCH 30.3 2018 06:13 AM    MCH 30.0 2009 03:59 PM    MCHC 33.0 (L) 2018 06:13 AM    MPV 10.1 2018 06:13 AM    NEUTSPOLYS 57.70 2018 06:13 AM    LYMPHOCYTES 30.70 2018 06:13 AM    MONOCYTES 7.50 2018 06:13 AM    EOSINOPHILS 3.00 2018  "06:13 AM    BASOPHILS 0.90 03/21/2018 06:13 AM      Lab Results   Component Value Date/Time    SODIUM 140 03/21/2018 06:13 AM    POTASSIUM 3.7 03/21/2018 06:13 AM    CHLORIDE 105 03/21/2018 06:13 AM    CO2 28 03/21/2018 06:13 AM    GLUCOSE 90 03/21/2018 06:13 AM    BUN 17 03/21/2018 06:13 AM    CREATININE 0.70 03/21/2018 06:13 AM    CREATININE 0.59 05/29/2009 03:59 PM    BUNCREATRAT 22 05/29/2009 03:59 PM    GLOMRATE >59 05/29/2009 03:59 PM    ALKPHOSPHAT 66 03/21/2018 06:13 AM    ASTSGOT 24 03/21/2018 06:13 AM    ALTSGPT 26 03/21/2018 06:13 AM    TBILIRUBIN 0.6 03/21/2018 06:13 AM     Lab Results   Component Value Date/Time    CHOLSTRLTOT 205 (H) 03/21/2018 06:13 AM    CHOLSTRLTOT 204 (H) 06/22/2016 06:32 AM     Lab Results   Component Value Date/Time     (H) 03/21/2018 06:13 AM     (H) 06/22/2016 06:32 AM     Lab Results   Component Value Date/Time    HDL 79 03/21/2018 06:13 AM    HDL 73 06/22/2016 06:32 AM     Lab Results   Component Value Date/Time    TRIGLYCERIDE 76 03/21/2018 06:13 AM    TRIGLYCERIDE 61 06/22/2016 06:32 AM     Lab Results   Component Value Date/Time    HBA1C 5.8 (H) 03/21/2018 06:13 AM    HBA1C 5.9 (H) 06/22/2016 06:32 AM     Lab Results   Component Value Date/Time    TSHULTRASEN 1.380 03/21/2018 06:13 AM     Lab Results   Component Value Date/Time    FREET4 0.78 03/21/2018 06:13 AM    FREET4 0.87 11/04/2015 06:20 AM     Lab Results   Component Value Date/Time    25HYDROXY 48 03/21/2018 06:13 AM     No components found for: VITB12  No results found for: FOLATE  Lab Results   Component Value Date/Time    25HYDROXY 48 03/21/2018 06:13 AM     No components found for: PROST  Lab Results   Component Value Date/Time    TESTOSTERONE 8 03/21/2018 06:13 AM        Objective:   Blood pressure 120/72, pulse 65, temperature 36.9 °C (98.5 °F), resp. rate 14, height 1.499 m (4' 11\"), weight 52 kg (114 lb 10.2 oz), SpO2 98 %. Body mass index is 23.15 kg/m².    Physical Exam:  Constitutional: " Alert, no distress.  Skin: Warm, dry, good turgor, no rashes in visible areas.  Eye: Equal, round and reactive, conjunctiva clear, lids normal.  ENMT: Lips without lesions, good dentition, oropharynx clear.  Neck: Trachea midline, no masses, no thyromegaly.  Respiratory: Unlabored respiratory effort, lungs clear to auscultation, no wheezes, no ronchi.  Cardiovascular: Normal S1, S2, no murmur, no edema.  Abdomen: Soft, non-tender, no masses, no hepatosplenomegaly.  Psych: Alert and oriented x3, normal affect and mood.    Assessment and Plan:   The following treatment plan was discussed  1. Cervicalgia      Acu placed in L KD, LR and R shanice barnes.  Will need to have 3 sessions of chiar yoga and either in between or after one month that she should have acupuncture follow up.     2. Acute pain of right shoulder      Acupuncture placed at right SI and shherlinda lai barnes.  Will recommend to use Cinnamon 2 tbsp in oatmeal daily or after her tumeric capsules are finished that we can use grounded tumeric as a spice in cooking to reduce inflammation.  Stop Algaecal and replace it with Boyle's Calcium with Magnesium gel cap to reduce the calcium and vitamin D over supplementation .   3. Dyslipidemia      Patient is recommened to use antiinflammatary diet and reduce the overall fat intake portion in her diet.  Since her job as a  in [place] that she couldn't have been sitting during work that she would be advised to ambulate as much as possible within her job environment constrains.  Patient is also encouraged to attend Food as Medicine introlecture.     Records requested.  Followup: Return in about 4 weeks (around 6/14/2018).    Spent 62 minutes in face-to-tace patient contact in which greater than 50% of the visit was spent in counseling and coordination of care including shoulder pain medication management options along with concerns and potential risks related to statin.  Answering patient questions about  GERD.  Discussing the nature of glaucoma.  Discussing in depth the importance of primary prevention of CVD.  Patient is also educated about lifestyle modifications which may improve her shoulder pain and GERD.  We also reviewed PMH, family history, and each of her chronic diagnoses in regards to current management, specialty physicians, symptom control, and future planning.  Please refer to assessment and plan/discussion/recommendations for additional details.          I have worked with consultants from the vendor as well as technical experts from [employer] to optimize the interface. I have made every reasonable attempt to correct obvious errors, but I expect that there are errors of grammar and possibly content that I did not discover before finalizing the note.

## 2018-05-17 ENCOUNTER — OFFICE VISIT (OUTPATIENT)
Dept: OTHER | Facility: IMAGING CENTER | Age: 73
End: 2018-05-17
Payer: MEDICARE

## 2018-05-17 VITALS
OXYGEN SATURATION: 98 % | BODY MASS INDEX: 23.11 KG/M2 | DIASTOLIC BLOOD PRESSURE: 72 MMHG | TEMPERATURE: 98.5 F | RESPIRATION RATE: 14 BRPM | SYSTOLIC BLOOD PRESSURE: 120 MMHG | HEIGHT: 59 IN | WEIGHT: 114.64 LBS | HEART RATE: 65 BPM

## 2018-05-17 DIAGNOSIS — M25.511 ACUTE PAIN OF RIGHT SHOULDER: ICD-10-CM

## 2018-05-17 DIAGNOSIS — M54.2 CERVICALGIA: ICD-10-CM

## 2018-05-17 DIAGNOSIS — E78.5 DYSLIPIDEMIA: ICD-10-CM

## 2018-05-17 PROCEDURE — 99215 OFFICE O/P EST HI 40 MIN: CPT | Performed by: FAMILY MEDICINE

## 2018-05-17 RX ORDER — IBUPROFEN 200 MG
200 TABLET ORAL EVERY 6 HOURS PRN
COMMUNITY
End: 2021-09-01

## 2018-05-17 RX ORDER — RANITIDINE 150 MG/1
150 TABLET ORAL 2 TIMES DAILY
COMMUNITY
End: 2020-04-15

## 2018-05-17 ASSESSMENT — PAIN SCALES - GENERAL: PAINLEVEL: 5=MODERATE PAIN

## 2018-05-17 NOTE — LETTER
[facility]  Mitchel Tesfaye D.O.  [address]  [place] [address]  Fax: [phone]   Authorization for Release/Disclosure of   Protected Health Information   Name: RADHA KERR : 1945 SSN: [id number]   Address: [address] [place] [address]  [place] [address] Phone:    [phone] (home) [phone] (work)   I authorize the entity listed below to release/disclose the PHI below to:   [facility]/Mitchel Tesfaye D.O. and Mitchel Tesfaye D.O.   Provider or Entity Name:     Address   City, State, Zip   Phone:      Fax:     Reason for request: continuity of care   Information to be released:    [  ] LAST COLONOSCOPY,  including any PATH REPORT and follow-up  [  ] LAST FIT/COLOGUARD RESULT [  ] LAST DEXA  [ x] LAST MAMMOGRAM  [  ] LAST PAP  [  ] LAST LABS [  ] RETINA EXAM REPORT  [  ] IMMUNIZATION RECORDS  [  ] Release all info      [  ] Check here and initial the line next to each item to release ALL health information INCLUDING  _____ Care and treatment for drug and / or alcohol abuse  _____ HIV testing, infection status, or AIDS  _____ Genetic Testing    DATES OF SERVICE OR TIME PERIOD TO BE DISCLOSED: _____________  I understand and acknowledge that:  * This Authorization may be revoked at any time by you in writing, except if your health information has already been used or disclosed.  * Your health information that will be used or disclosed as a result of you signing this authorization could be re-disclosed by the recipient. If this occurs, your re-disclosed health information may no longer be protected by State or Federal laws.  * You may refuse to sign this Authorization. Your refusal will not affect your ability to obtain treatment.  * This Authorization becomes effective upon signing and will  on (date) __________.      If no date is indicated, this Authorization will  one (1) year from the signature date.    Name: Radha Kerr    Signature:   Date:     2018          PLEASE FAX REQUESTED RECORDS BACK TO: [phone]

## 2018-05-17 NOTE — ASSESSMENT & PLAN NOTE
Patient works as a  in [employer] and has been needing repetitive motions at right hand as she is right handed.  She was using Salonpas and Tiger balm and has been able to have some reduction of pain but it was only manageable and she is wondering what else she can try to reduce her pain.  Her work is long hour and she is in need of repetitive motions at arm for her line of work.

## 2018-05-18 NOTE — ASSESSMENT & PLAN NOTE
Her neck has been having chronic pain at right upper part and the lower neck is typically fairly non-tender with the exception of when the shoulder is flared that at posterior aspect would trigger a more even right lower cervical left rotation restrictions.

## 2018-05-24 ENCOUNTER — APPOINTMENT (OUTPATIENT)
Dept: OTHER | Facility: IMAGING CENTER | Age: 73
End: 2018-05-24

## 2018-05-31 ENCOUNTER — APPOINTMENT (OUTPATIENT)
Dept: OTHER | Facility: IMAGING CENTER | Age: 73
End: 2018-05-31

## 2018-06-07 ENCOUNTER — OFFICE VISIT (OUTPATIENT)
Dept: OTHER | Facility: IMAGING CENTER | Age: 73
End: 2018-06-07
Payer: MEDICARE

## 2018-06-07 ENCOUNTER — APPOINTMENT (OUTPATIENT)
Dept: OTHER | Facility: IMAGING CENTER | Age: 73
End: 2018-06-07

## 2018-06-07 VITALS
HEIGHT: 59 IN | HEART RATE: 67 BPM | DIASTOLIC BLOOD PRESSURE: 76 MMHG | RESPIRATION RATE: 14 BRPM | SYSTOLIC BLOOD PRESSURE: 130 MMHG | WEIGHT: 116 LBS | BODY MASS INDEX: 23.39 KG/M2 | OXYGEN SATURATION: 98 % | TEMPERATURE: 98.7 F

## 2018-06-07 DIAGNOSIS — J01.00 SUBACUTE MAXILLARY SINUSITIS: ICD-10-CM

## 2018-06-07 PROCEDURE — 99214 OFFICE O/P EST MOD 30 MIN: CPT | Performed by: FAMILY MEDICINE

## 2018-06-07 RX ORDER — DOXYCYCLINE HYCLATE 100 MG
100 TABLET ORAL 2 TIMES DAILY
Qty: 20 TAB | Refills: 0 | Status: SHIPPED | OUTPATIENT
Start: 2018-06-07 | End: 2018-06-17

## 2018-06-07 ASSESSMENT — PAIN SCALES - GENERAL: PAINLEVEL: 3=SLIGHT PAIN

## 2018-06-08 NOTE — ASSESSMENT & PLAN NOTE
Patient for the last 2 days had noticed to have more issues in terms of frontal headache with left side predominantly at the temple area.  Upon further questioning patient reported for the last week and half and noticed to have increased yellow drainage with feeling of bilateral ear pressure but reports no ear popping sensation.  Incidentally she has started to use slippery elm yesterday and had experienced headache onset therefore she is wondering if that has something tied to slippery elm side effect.  About 1 week after the last appointment patient did have one episode of dizziness that seems to be transient to the above fraction of the second while she was going up the stairs but had not recurred since.  AAO-HNS score 3.

## 2018-06-08 NOTE — PATIENT INSTRUCTIONS
GI symptoms with viral infections, chemo or antibiotics could be assisted by Bifidobacterium Lactis with combination L. Acidophilus, L. Casei, Returi, Rhamnosus, Bulgaricus, GG, paracasei F19, or longum. Saccharomyces boulardii and Strptococus thermophilus  Can add L. Rhamnosus GR-1, L. Fermentum RC-14 and [id number] or L. Salivarious [id number]

## 2018-06-08 NOTE — PROGRESS NOTES
Chief Complaint   Patient presents with   • Headache     x 2 days; dizziness;  minimal relief with advil and tylenol; left temple pain     Subjective:   Radha Kerr is a 72 y.o. female here today for multiple problems as listed below.      Subacute maxillary sinusitis  Patient for the last 2 days had noticed to have more issues in terms of frontal headache with left side predominantly at the temple area.  Upon further questioning patient reported for the last week and half and noticed to have increased yellow drainage with feeling of bilateral ear pressure but reports no ear popping sensation.  Incidentally she has started to use slippery elm yesterday and had experienced headache onset therefore she is wondering if that has something tied to slippery elm side effect.  About 1 week after the last appointment patient did have one episode of dizziness that seems to be transient to the above fraction of the second while she was going up the stairs but had not recurred since.  AAO-HNS score 3.     Current medicines (including changes today)  Current Outpatient Prescriptions   Medication Sig Dispense Refill   • MISC NATURAL PRODUCTS PO Take 400 mg by mouth 2 Times a Day.     • Calcium-Magnesium-Vitamin D (CALCIUM MAGNESIUM PO) Take  by mouth every day.     • doxycycline (VIBRAMYCIN) 100 MG Tab Take 1 Tab by mouth 2 times a day for 10 days. 20 Tab 0   • dorzolamide-timolol (COSOPT) 22.3-6.8 MG/ML Solution Place 1 Drop in right eye 2 times a day.     • VITAMINS B1 B6 B12 PO Take  by mouth.     • D-MANNOSE PO Take 500 mg by mouth 2 Times a Day.     • aspirin (ASA) 81 MG Chew Tab chewable tablet Take 81 mg by mouth every day.     • Acetaminophen (TYLENOL PO) Take  by mouth.     • latanoprost (XALATAN) 0.005 % SOLN Place 1 Drop in both eyes every evening. Lt eye daily      • Cholecalciferol (VITAMIN D-3 SUPER STRENGTH) 2000 UNIT TABS Take  by mouth.     • ibuprofen (MOTRIN) 200 MG Tab Take 200 mg by mouth every 6  "hours as needed.     • raNITidine (ZANTAC) 150 MG Tab Take 150 mg by mouth 2 times a day.     • Non Formulary Request Strontium, MWF     • Non Formulary Request alfaecal plus for bone strength       No current facility-administered medications for this visit.      She  has a past medical history of Anesthesia; Arthritis; CATARACT; GERD (gastroesophageal reflux disease); Glaucoma; Hyperlipidemia; and Urinary bladder disorder.    ROS  No chest pain, no shortness of breath, no abdominal pain, no diarrhea/constipation, no urinary symptoms.     Objective:     Blood pressure 130/76, pulse 67, temperature 37.1 °C (98.7 °F), resp. rate 14, height 1.499 m (4' 11\"), weight 52.6 kg (116 lb), SpO2 98 %. Body mass index is 23.43 kg/m².   Physical Exam:  Alert, oriented in no acute distress.  Eye contact is good, speech goal directed, affect calm  HEENT: conjunctiva non-injected, sclera non-icteric.  Pinna normal. TM pearly gray on the left.   Serous fluid on the right with no erythema.  Oral mucous membranes pink and moist with no lesions.  Frontal and maxillary sinus tenderness upon palpation.  Neck: No adenopathy or masses in the neck or supraclavicular regions.  Lungs: clear to auscultation bilaterally with good excursion.  CV: regular rate and rhythm.  Abdomen: soft, nontender, No CVAT  Ext: no edema, color normal, vascularity normal, temperature normal    Assessment and Plan:   The following treatment plan was discussed  1. Subacute maxillary sinusitis  doxycycline (VIBRAMYCIN) 100 MG Tab    Recommend take Alliultra 3 capsules 3 times daily for the next 10 days.  Add SinuThyme by Sinus survival 2 capsules daily in addition to the species of probiotics in the AVS.  Also perform sinus rinse twice per day for next 2 weeks.  Should there be no improvement in 7 days we will actually need to utilize Doxycycline 100 mg 2 times daily for next for 10 days.     Followup: Return in about 2 weeks (around 6/21/2018) if symptoms worsen " or fail to improve.    Spent 25 minutes in face-to-tace patient contact in which greater than 50% of the visit was spent in counseling and coordination of care including Sinusitis medication management options., Concerns and potential risks related to Doxycycline, Answering patient questions about Headache relationship to his sinusitis., Discussing the nature of Subacute maxillary sinusitis. and Patient is also educated about lifestyle modifications which may improve Her GERD.  We also reviewed PMH, family history, and each of her chronic diagnoses in regards to current management, specialty physicians, symptom control, and future planning. Please refer to assessment and plan/discussion/recommendations for additional details.        Please note that dictation has been dictated using voice recognition soft ware. I have made every reasonable attempt to correct obvious errors, but I expect that there are errors of grammar and possibly content that I did not discover before finalizing the note.

## 2018-06-11 ENCOUNTER — APPOINTMENT (OUTPATIENT)
Dept: OTHER | Facility: IMAGING CENTER | Age: 73
End: 2018-06-11

## 2018-06-12 ENCOUNTER — APPOINTMENT (OUTPATIENT)
Dept: RADIOLOGY | Facility: MEDICAL CENTER | Age: 73
End: 2018-06-12
Attending: EMERGENCY MEDICINE
Payer: COMMERCIAL

## 2018-06-12 ENCOUNTER — HOSPITAL ENCOUNTER (EMERGENCY)
Facility: MEDICAL CENTER | Age: 73
End: 2018-06-12
Attending: EMERGENCY MEDICINE
Payer: COMMERCIAL

## 2018-06-12 VITALS
OXYGEN SATURATION: 98 % | WEIGHT: 116 LBS | DIASTOLIC BLOOD PRESSURE: 71 MMHG | SYSTOLIC BLOOD PRESSURE: 125 MMHG | HEIGHT: 59 IN | BODY MASS INDEX: 23.39 KG/M2 | RESPIRATION RATE: 16 BRPM | HEART RATE: 73 BPM | TEMPERATURE: 98 F

## 2018-06-12 DIAGNOSIS — S82.042A CLOSED DISPLACED COMMINUTED FRACTURE OF LEFT PATELLA, INITIAL ENCOUNTER: ICD-10-CM

## 2018-06-12 PROCEDURE — 73564 X-RAY EXAM KNEE 4 OR MORE: CPT | Mod: LT

## 2018-06-12 PROCEDURE — 99284 EMERGENCY DEPT VISIT MOD MDM: CPT

## 2018-06-12 PROCEDURE — A9270 NON-COVERED ITEM OR SERVICE: HCPCS | Performed by: EMERGENCY MEDICINE

## 2018-06-12 PROCEDURE — 700102 HCHG RX REV CODE 250 W/ 637 OVERRIDE(OP): Performed by: EMERGENCY MEDICINE

## 2018-06-12 RX ORDER — HYDROCODONE BITARTRATE AND ACETAMINOPHEN 5; 325 MG/1; MG/1
2 TABLET ORAL ONCE
Status: COMPLETED | OUTPATIENT
Start: 2018-06-12 | End: 2018-06-12

## 2018-06-12 RX ORDER — HYDROCODONE BITARTRATE AND ACETAMINOPHEN 5; 325 MG/1; MG/1
1 TABLET ORAL EVERY 4 HOURS PRN
Qty: 12 TAB | Refills: 0 | Status: SHIPPED | OUTPATIENT
Start: 2018-06-12 | End: 2018-06-15

## 2018-06-12 RX ADMIN — HYDROCODONE BITARTRATE AND ACETAMINOPHEN 1 TABLET: 5; 325 TABLET ORAL at 14:38

## 2018-06-12 ASSESSMENT — PAIN SCALES - GENERAL: PAINLEVEL_OUTOF10: 5

## 2018-06-12 NOTE — ED TRIAGE NOTES
Pt bib [employer] from the [employer] where she was working when she fell and injured her L knee while walking up stairs. Pt has hx of meniscus repair to bilat knees. Pt was not able to bear weight on the LLE after the injury. Swelling and bruising noted to L knee. Pt arrives to ED A&Ox4, VSS, c/o pain 5/10. Extremity injury protocol initiated. Chart up for ERP.

## 2018-06-12 NOTE — ED NOTES
Knee immobilizer applied by ED tech. CMS intact. Crutches and crutch teaching provided. Awaiting workman's comp for discharge.  at bedside.

## 2018-06-12 NOTE — DISCHARGE INSTRUCTIONS
You broke her kneecap. This injury requires surgery within the next few weeks. Use urinate immobilizer except when you are bathing or showering. Use the crutches for extra support. You can bear as much weight as you want to  [place] as long as it is in the immobilizer.   Fracture Care, Generic  The 206 bones in our body are important for supporting our body (skeleton) and also for production of blood cells by the bone marrow. A fracture is a break in a tissue. A tissue is a collection of cells that performs a function or job in our body. We most commonly think of fractures in bones.  When a bone is broken, or fractured, it affects not only blood production and function. There may also be other damage when structures near the bones are injured.  There are three main types of fractures:  · Open - where there is a wound leading to the fracture site or the bone is protruding from the skin.   · Closed - where the bone has fractured but has no external wound.   · Complicated - this may involve damage to associated vital organs and major blood vessels as a result of the fracture.   Fractures are usually managed by keeping the bones in place long enough for them to heal. This is usually done with splints or casts. Sometimes surgery is required and pins, plates and screws may be used to hold fractures in proper position. The amount of time it takes a fracture to heal depends mostly on the age and health of the patient.  Young children are prone to fractures. These fractures heal rather quickly. The common fractures suffered by children tend to be associated with the arms and wrists. As young bones do not harden for some years, children's fractures tend to 'bend and splinter', similar to a broken branch on a tree. This the reason for the name, 'greenstick fracture'.  As we grow older, there may be a loss of bone known as osteopenia or osteoporosis. These conditions make breaking a bone much easier. Sometimes a minor accident  or simply over-use may produce a fracture. These fractures do not heal as fast a younger person's.  SYMPTOMS  The signs and symptoms of fractures of bones depend on how bad the injury is. If shock is present, there may be pale, cool, clammy skin with a rapid, weak pulse. There is usually pain and tenderness in the area of the fracture. There may or may not be deformity of the bone. There may be injury to surrounding tissues.  TREATMENT  · Care and treatment of fractures relies on immobilization and adequate splinting of the injury. If the fracture is complex, the wound associated with an open fracture may be difficult to handle without professional help.   · If the pulse to the end part of the limb (distal pulse) cannot be restored by gentle traction, then the limb should be stabilized in its current position. Urgent ambulance transport should be obtained. Do not waste time with splinting.   · Generally, fractured limbs should be made immobile and left for medical aid. In remote areas or some distance from medical aid, you may be required to treat as follows.   FRACTURED FOREARM  · Check for pulse at the end part of the limb. If none - gentle traction until pulse returns   · Treat any wounds   · Pad bony prominences   · Apply adequate splinting.   · Secure above and below fracture, secure wrist.   · Reassess pulse or return of color and/or warmth.   · Elevate injury with arm sling.    FRACTURED UPPER ARM  · Check for pulse at the end part of the limb, if none - gentle traction until pulse returns.   · Treat any wounds.   · Pad between arm and chest.   · Apply 'collar and cuff' sling, secure above and below fracture firmly against chest with triangular bandages.   · Reassess pulse or return of color and/or warmth.    FRACTURED LEG  · Check for circulation and pulse at the end part of the limb (skin color and temperature). If no circulation, apply gentle traction until pulse or color returns.   · Call '911' for an  ambulance.   · Treat any wounds.   · Immobilize (keep it from moving) the limb.   · Pad bony prominences.   · Reassess circulation below injury.   FRACTURED PELVIS  · Call '911' for an ambulance.   · Check for pulses in both legs.   · Bend legs at knees, elevate lower legs slightly and support on pillows or something padded.   · Support both hips with folded blankets either side.   · Discourage attempts to urinate.   · Care must be exercised with a suspected fractured pelvis. This injury may have serious complications. The casualty should always be transported by ambulance and not by alternative means unless absolutely necessary.   FRACTURED JAW  · A common injury in certain contact sports is dislocation, or fracture of the lower jaw (mandible). The casualty will have pain in the jaw, be unable to speak properly, and may have trouble swallowing.   · Call '911' for an ambulance.   · Support the jaw.   · Sit the injured person leaning slightly forward.   · Rest the injured jaw on a pad held by the injured person.   · DO NOT apply a bandage to support the jaw.   · Observe the casualty carefully for signs of breathing difficulties and any indication that he or she is becoming drowsy or unconscious.   SLINGS  · Slings are used to support an injured arm, or to supplement treatment for another injury such as fractured ribs. Generally, the most effective sling is made with a triangular bandage. Every first aid kit, no matter how small, should have at least one of these bandages.   · Although triangular bandages are preferable, any material (tie, belt, or piece of thick twine or rope) can be used in an emergency. If no likely material is at hand, an injured arm can be adequately supported by inserting it inside the injured person's shirt or blouse. Similarly, a safety pin applied to a sleeve and secured to clothing on the chest may work well enough.   · There are essentially three types of sling; the arm sling for injuries  to the forearm, the elevated sling for injuries to the shoulder, and the 'collar-and-cuff' or clove hitch for injuries to the upper arm and as supplementary support to fractured ribs.   · After application of any sling, always check the circulation to the limb by feeling for the pulse at the wrist, or by squeezing a fingernail and observing for change of color in the nail bed. All slings must be in a position that is comfortable for the injured person. Never force an arm into the 'right position'.   ARM SLING  · Support the injured forearm approximately parallel to the ground with the wrist slightly higher than the elbow.   · Place an opened triangular bandage between the body and the arm, with its apex towards the elbow.   · Extend the upper point of the bandage over the shoulder on the uninjured side.   · Bring the lower point up over the arm, across the shoulder on the injured side to join the upper point and tie firmly with a reef knot.   · Ensure the elbow is secured by folding the excess bandage over the elbow and securing with a safety pin.   ELEVATED SLING  · Support the injured arm with the elbow beside the body and the hand extended towards the uninjured shoulder.   · Place an opened triangular bandage over the forearm and hand, with the apex towards the elbow.   · Extend the upper point of the bandage over the uninjured shoulder.   · Tuck the lower part of the bandage under the injured arm, bring it under the elbow and around the back and extend the lower point up to meet the upper point at the shoulder.   · Tie firmly with a reef knot.   · Secure the elbow by folding the excess material and applying a safety pin, then ensure that the sling is tucked under the arm giving firm support.   COLLAR-AND-CUFF (CLOVE HITCH)   · Allow the elbow to hang naturally at the side and place the hand extended towards the shoulder on the uninjured side.   · Form a clove hitch by forming two loops - one towards you, one  "away from you.   · Put the loops together by sliding your hands under the loops and closing with a \"clapping\" motion. If you are experienced at forming a clove hitch, then apply a clove hitch directly on the wrist, but take care not to move the injured arm.   · Slide the clove hitch over the hand and gently pull it firmly to secure the wrist.   · Extend the points of the bandage to either side of the neck and tie firmly with a reef knot.   · Allow the arm to hang comfortably. For support to fractured ribs, apply triangular bandages around the body and upper arm to hold the arm firmly against the chest.   If your caregiver has given you a follow-up appointment, it is very important to keep that appointment. This includes any orthopedic referrals, physical therapy, and rehabilitation. Any delay in obtaining necessary care could result in a delay or failure of the bones to heal. Not keeping the appointment could result in a chronic or permanent injury, pain, and disability. If there is any problem keeping the appointment, you must call back to this facility for assistance.   Document Released: 12/22/2005 Document Revised: 03/11/2013 Document Reviewed: 07/24/2009  ExitCare® Patient Information ©2013 [employer].  "

## 2018-06-12 NOTE — LETTER
"  FORM C-4:  EMPLOYEE’S CLAIM FOR COMPENSATION/ REPORT OF INITIAL TREATMENT  EMPLOYEE’S CLAIM - PROVIDE ALL INFORMATION REQUESTED   First Name  Manee Last Name  Cirilo Birthdate            Age  1945 72 y.o. 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 (if applicable)      How did this injury or occupational disease occur? Be specific and answer in detail. Use additional sheet if necessary)     If you believe that you have an occupational disease, when did you first have knowledge of the disability and it relationship to your employment?   Witnesses to the Accident       Nature of Injury or Occupational Disease    Part(s) of Body Injured or Affected  , ,     I certify that the above is true and correct to the best of my knowledge and that I have provided this information in order to obtain the benefits of [place]  I hereby authorize any physician, chiropractor, surgeon, practitioner, or other person, any hospital, including " [facility] or [facility] hospital, any medical service organization, any insurance company, or other institution or organization to release to each other, any medical or other information, including benefits paid or payable, pertinent to this injury or disease, except information relative to diagnosis, treatment and/or counseling for AIDS, psychological conditions, alcohol or controlled substances, for which I must give specific authorization.  A Photostat of this authorization shall be as valid as the original.   Date Place   Employee’s Signature   THIS REPORT MUST BE COMPLETED AND MAILED WITHIN 3 WORKING DAYS OF TREATMENT   Place  [facility], EMERGENCY DEPT  Name of Facility   [facility]   Date  6/12/2018 Diagnosis  (S82.042A) Closed displaced comminuted fracture of left patella, initial encounter Is there evidence the injured employee was under the influence of alcohol and/or another controlled substance at the time of accident?   Hour  6:38 PM Description of Injury or Disease  Closed displaced comminuted fracture of left patella, initial encounter No   Treatment  xrays, knee immobilize,r crutches, pain control.   Have you advised the patient to remain off work five days or more?         Yes   X-Ray Findings  Positive  Comments:patella fracture.   If Yes   From Date  6/12/2018 To Date  6/16/2018   From information given by the employee, together with medical evidence, can you directly connect this injury or occupational disease as job incurred?  Yes If No, is the employee capable of: Full Duty  No Modified Duty  Yes   Is additional medical care by a physician indicated?  Yes  Comments:Orthopedics. This fracture will require surgery. If Modified Duty, Specify any Limitations / Restrictions  Could perform duties that do not require extensive walking, and that allow the use of knee immobilizer a crutches, such as desk work.     Do you know of any previous injury  "or disease contributing to this condition or occupational disease?  No   Date  6/12/2018 Print Doctor’s Name  Shan Dunlap I certify the employer’s copy of this form was mailed on:   Address  [address] [address] [phone] Insurer’s Use Only   [facility]  [address]    Provider’s Tax ID Number  [id number] Telephone  Dept: [phone]    Doctor’s Signature  e-SHAN Clark M.D. Degree       Original - TREATING PHYSICIAN OR CHIROPRACTOR   Pg 2-Insurer/TPA   Pg 3-Employer   Pg 4-Employee                                                                                                  Form C-4 (rev01/03)     BRIEF DESCRIPTION OF RIGHTS AND BENEFITS  (Pursuant to [place] 616C.050)    Notice of Injury or Occupational Disease (Incident Report Form C-1): If an injury or occupational disease (OD) arises out of and in the course of employment, you must provide written notice to your employer as soon as practicable, but no later than 7 days after the accident or OD. Your employer shall maintain a sufficient supply of the required forms.    Claim for Compensation (Form C-4): If medical treatment is sought, the form C-4 is available at the place of initial treatment. A completed \"Claim for Compensation\" (Form C-4) must be filed within 90 days after an accident or OD. The treating physician or chiropractor must, within 3 working days after treatment, complete and mail to the employer, the employer's insurer and third-party , the Claim for Compensation.    Medical Treatment: If you require medical treatment for your on-the-job injury or OD, you may be required to select a physician or chiropractor from a list provided by your workers’ compensation insurer, if it has contracted with an Organization for Managed Care (MCO) or Preferred Provider Organization (PPO) or providers of health care. If your employer has not entered into a contract with an MCO or PPO, you may " select a physician or chiropractor from the Panel of Physicians and Chiropractors. Any medical costs related to your industrial injury or OD will be paid by your insurer.    Temporary Total Disability (TTD): If your doctor has certified that you are unable to work for a period of at least 5 consecutive days, or 5 cumulative days in a 20-day period, or places restrictions on you that your employer does not accommodate, you may be entitled to TTD compensation.    Temporary Partial Disability (TPD): If the wage you receive upon reemployment is less than the compensation for TTD to which you are entitled, the insurer may be required to pay you TPD compensation to make up the difference. TPD can only be paid for a maximum of 24 months.    Permanent Partial Disability (PPD): When your medical condition is stable and there is an indication of a PPD as a result of your injury or OD, within 30 days, your insurer must arrange for an evaluation by a rating physician or chiropractor to determine the degree of your PPD. The amount of your PPD award depends on the date of injury, the results of the PPD evaluation and your age and wage.    Permanent Total Disability (PTD): If you are medically certified by a treating physician or chiropractor as permanently and totally disabled and have been granted a PTD status by your insurer, you are entitled to receive monthly benefits not to exceed 66 2/3% of your average monthly wage. The amount of your PTD payments is subject to reduction if you previously received a PPD award.    Vocational Rehabilitation Services: You may be eligible for vocational rehabilitation services if you are unable to return to the job due to a permanent physical impairment or permanent restrictions as a result of your injury or occupational disease.    Transportation and Per [place] Reimbursement: You may be eligible for travel expenses and per salvador associated with medical treatment.  Reopening: You may be able to  reopen your claim if your condition worsens after claim closure.    Appeal Process: If you disagree with a written determination issued by the insurer or the insurer does not respond to your request, you may appeal to the Department of Administration, , by following the instructions contained in your determination letter. You must appeal the determination within 70 days from the date of the determination letter at [address], [address], [place], [place] [address], or [address], [address], [place], [place] [address]. If you disagree with the  decision, you may appeal to the Department of Administration, . You must file your appeal within 30 days from the date of the  decision letter at [address], [address], [place], [place] [address], or [address], [place], [place] [address]. If you disagree with a decision of an , you may file a petition for judicial review with the District Court. You must do so within 30 days of the Appeal Officer’s decision. You may be represented by an  at your own expense or you may contact the [facility] for possible representation.    [place]  for Injured Workers (NAIW): If you disagree with a  decision, you may request that NAIW represent you without charge at an  Hearing. For information regarding denial of benefits, you may contact the [facility] at: [address], [address], [place] [address], [phone], or [address], [address], [place] [address], [phone]    To File a Complaint with the Division: If you wish to file a complaint with the  of the Division of Industrial Relations (DIR), please contact the Workers’ Compensation Section, [address], [address], [place], [place] [address], telephone [phone]) [phone], or [address], [place]  [address], telephone [phone].    For assistance with Workers’ Compensation Issues: you may contact the Office of the Governor Consumer Health Assistance, [address] [place] [address], [address], [place] [address], Toll Free 1-784.195.6291, Web site: http://[employer].[facility].nv., E-mail [email or web address].                                                                                                                                                                               __________________________________________________________________                                    _________________            Employee Name / Signature                                                                                                                            Date                                       D-2 (rev. 10/07)

## 2018-06-12 NOTE — LETTER
"  FORM C-4:  EMPLOYEE’S CLAIM FOR COMPENSATION/ REPORT OF INITIAL TREATMENT  EMPLOYEE’S CLAIM - PROVIDE ALL INFORMATION REQUESTED   First Name  Manee Last Name  Cirilo Birthdate             Age  1945 72 y.o. 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 (if applicable)  RUNNING UP THE STAIRS    How did this injury or occupational disease occur? Be specific and answer in detail. Use additional sheet if necessary)  RUNNING UP THGE STAIRS AND FELL DOWN   If you believe that you have an occupational disease, when did you first have knowledge of the disability and it relationship to your employment?  N/A Witnesses to the Accident  NONE     Nature of Injury or Occupational Disease  Workers' Compensation  Part(s) of Body Injured or Affected  Knee (L), N/A, N/A    I certify that the above is true and correct to the best of my knowledge and that I have provided this information in order to obtain the benefits of [place] inclusive or [place]).  I hereby authorize any physician, chiropractor, surgeon, practitioner, or other person, any hospital, including [facility] or [facility] hospital, any medical service organization, any insurance company, or other institution or organization to release to each other, any medical or other information, including benefits paid or payable, pertinent to this injury or disease, except information relative to diagnosis, treatment and/or counseling for AIDS, psychological conditions, alcohol or controlled substances, for which I must give specific authorization.  A Photostat of this authorization shall be as valid as the original.   Date  06/12/2018 Place  [facility] Employee’s Signature   THIS REPORT MUST BE COMPLETED AND MAILED WITHIN 3 WORKING DAYS OF TREATMENT   Place  [facility], EMERGENCY DEPT  Name of Facility   [facility]   Date  6/12/2018 Diagnosis  (S82.042A) Closed displaced comminuted fracture of left patella, initial encounter Is there evidence the injured employee was under the influence of alcohol and/or another controlled substance at the time of accident?   Hour  6:50 PM Description of Injury or Disease  Closed displaced comminuted fracture of left patella, initial encounter No   Treatment  xrays, knee immobilize,r crutches, pain control.   Have you advised the patient to remain off work five days or more?         Yes   X-Ray Findings  Positive  Comments:patella fracture.   If Yes   From Date  6/12/2018 To Date  6/16/2018   From information given by the employee, together with medical evidence, can you directly connect this injury or occupational disease as job incurred?  Yes If No, is the employee capable of: Full Duty  No Modified Duty  Yes   Is additional medical care by a physician indicated?  Yes  Comments:Orthopedics. This fracture will require surgery. If Modified Duty, Specify any  "Limitations / Restrictions  Could perform duties that do not require extensive walking, and that allow the use of knee immobilizer a crutches, such as desk work.     Do you know of any previous injury or disease contributing to this condition or occupational disease?  No   Date  6/12/2018 Print Doctor’s Name  Shan Dunlap certify the employer’s copy of this form was mailed on:   Address  [address] [place] [id number] [phone] Insurer’s Use Only   [facility]  [address]    Provider’s Tax ID Number  [id number] Telephone  Dept: [phone]    Doctor’s Signature  e-SHAN Clark M.D. Degree   M.D.    Original - TREATING PHYSICIAN OR CHIROPRACTOR   Pg 2-Insurer/TPA   Pg 3-Employer   Pg 4-Employee                                                                                                  Form C-4 (rev01/03)   BRIEF DESCRIPTION OF RIGHTS AND BENEFITS  (Pursuant to [place] 616C.050)  Notice of Injury or Occupational Disease (Incident Report Form C-1): If an injury or occupational disease (OD) arises out of and in the course of employment, you must provide written notice to your employer as soon as practicable, but no later than 7 days after the accident or OD. Your employer shall maintain a sufficient supply of the required forms.  Claim for Compensation (Form C-4): If medical treatment is sought, the form C-4 is available at the place of initial treatment. A completed \"Claim for Compensation\" (Form C-4) must be filed within 90 days after an accident or OD. The treating physician or chiropractor must, within 3 working days after treatment, complete and mail to the employer, the employer's insurer and third-party , the Claim for Compensation.  Medical Treatment: If you require medical treatment for your on-the-job injury or OD, you may be required to select a physician or chiropractor from a list provided by your workers’ compensation insurer, if it has contracted " with an Organization for Managed Care (MCO) or Preferred Provider Organization (PPO) or providers of health care. If your employer has not entered into a contract with an MCO or PPO, you may select a physician or chiropractor from the Panel of Physicians and Chiropractors. Any medical costs related to your industrial injury or OD will be paid by your insurer.  Temporary Total Disability (TTD): If your doctor has certified that you are unable to work for a period of at least 5 consecutive days, or 5 cumulative days in a 20-day period, or places restrictions on you that your employer does not accommodate, you may be entitled to TTD compensation.  Temporary Partial Disability (TPD): If the wage you receive upon reemployment is less than the compensation for TTD to which you are entitled, the insurer may be required to pay you TPD compensation to make up the difference. TPD can only be paid for a maximum of 24 months.  Permanent Partial Disability (PPD): When your medical condition is stable and there is an indication of a PPD as a result of your injury or OD, within 30 days, your insurer must arrange for an evaluation by a rating physician or chiropractor to determine the degree of your PPD. The amount of your PPD award depends on the date of injury, the results of the PPD evaluation and your age and wage.  Permanent Total Disability (PTD): If you are medically certified by a treating physician or chiropractor as permanently and totally disabled and have been granted a PTD status by your insurer, you are entitled to receive monthly benefits not to exceed 66 2/3% of your average monthly wage. The amount of your PTD payments is subject to reduction if you previously received a PPD award.  Vocational Rehabilitation Services: You may be eligible for vocational rehabilitation services if you are unable to return to the job due to a permanent physical impairment or permanent restrictions as a result of your injury or  occupational disease.  Transportation and Per [place] Reimbursement: You may be eligible for travel expenses and per salvador associated with medical treatment.  Reopening: You may be able to reopen your claim if your condition worsens after claim closure.  Appeal Process: If you disagree with a written determination issued by the insurer or the insurer does not respond to your request, you may appeal to the Department of Administration, , by following the instructions contained in your determination letter. You must appeal the determination within 70 days from the date of the determination letter at [address], [address], [place], [place] [address], or [address], [address], [place], [place] [address]. If you disagree with the  decision, you may appeal to the Department of Administration, . You must file your appeal within 30 days from the date of the  decision letter at [address], [address], [place], [place] [address], or [address], [place], [place] [address]. If you disagree with a decision of an , you may file a petition for judicial review with the District Court. You must do so within 30 days of the Appeal Officer’s decision. You may be represented by an  at your own expense or you may contact the [facility] for possible representation.  [place]  for Injured Workers (NAIW): If you disagree with a  decision, you may request that NAIW represent you without charge at an  Hearing. For information regarding denial of benefits, you may contact the [facility] at: [address], [address], [place] [address], [phone], or [address], [place] [address], [phone]  To File a Complaint with the Division: If you wish to file a complaint with the  of the Division of Industrial Relations (DIR), please contact the  Workers’ Compensation Section, [address], [address], [place], [place] [address], telephone [phone], or [address], [address], [place], [place] [address], telephone [phone].  For assistance with Workers’ Compensation Issues: you may contact the Office of the Governor Consumer Health Assistance, [address] [place] [address], [address], [place], [place] [address], Toll Free 1-610.208.5546, Web site: http://Angle.[facility].nv., E-mail [email or web address].                                                                                                                                                                               __________________________________________________________________                                    _06/12/2018__            Employee Name / Signature                                                                                                                            Date                                       D-2 (rev. 10/07)

## 2018-06-12 NOTE — ED PROVIDER NOTES
ED Provider Note    Scribed for Jacinto Dunlap M.D. by Anusha Thompson. 6/12/2018,  2:21 PM.    CHIEF COMPLAINT  Chief Complaint   Patient presents with   • T-5000 GLF   • Knee Injury     left       HPI  Radha Kerr is a 72 y.o. female who presents to the Emergency Department for evaluation following a mechanical ground level fall that occurred just prior to arrival in the ED. Patient reports that she was hustling down some concrete steps when she fell near the bottom, landing hard on her left knee, immediately beginning to experience some pain, swelling and bruising to the joint. Patient was unable to ambulate secondary to pian following the incident. She describes the pain localized anteriorly, not specifically medially or laterally. Patient reports bilateral meniscus tears, 2010 and 2013, with both being successfully repaired, the most recent by Dr. Giles, Orthopedics. No complaints of any other injuries sustained today.  No complaints of dizziness, or weakness just prior to fall.    REVIEW OF SYSTEMS  See HPI for further details. All other systems are negative. C    PAST MEDICAL HISTORY   has a past medical history of Anesthesia; Arthritis; CATARACT; GERD (gastroesophageal reflux disease); Glaucoma; Hyperlipidemia; and Urinary bladder disorder.    SOCIAL HISTORY  Social History     Social History Main Topics   • Smoking status: Never Smoker   • Smokeless tobacco: Never Used   • Alcohol use No   • Drug use: No      Comment: , Works for [employer]     History   Drug Use No       SURGICAL HISTORY   has a past surgical history that includes abdominal hysterectomy total; knee arthroscopy (7/13/2010); medial meniscectomy (7/13/2010); knee arthroscopy (1/9/2013); medial meniscectomy (1/9/2013); and colonoscopy (12/17/13).    CURRENT MEDICATIONS  No current facility-administered medications on file prior to encounter.      Current Outpatient Prescriptions on File Prior to Encounter   Medication Sig  "Dispense Refill   • MISC NATURAL PRODUCTS PO Take 400 mg by mouth 2 Times a Day.     • Calcium-Magnesium-Vitamin D (CALCIUM MAGNESIUM PO) Take  by mouth every day.     • doxycycline (VIBRAMYCIN) 100 MG Tab Take 1 Tab by mouth 2 times a day for 10 days. 20 Tab 0   • ibuprofen (MOTRIN) 200 MG Tab Take 200 mg by mouth every 6 hours as needed.     • raNITidine (ZANTAC) 150 MG Tab Take 150 mg by mouth 2 times a day.     • Non Formulary Request Strontium, MWF     • Non Formulary Request alfaecal plus for bone strength     • dorzolamide-timolol (COSOPT) 22.3-6.8 MG/ML Solution Place 1 Drop in right eye 2 times a day.     • VITAMINS B1 B6 B12 PO Take  by mouth.     • D-MANNOSE PO Take 500 mg by mouth 2 Times a Day.     • aspirin (ASA) 81 MG Chew Tab chewable tablet Take 81 mg by mouth every day.     • Acetaminophen (TYLENOL PO) Take  by mouth.     • latanoprost (XALATAN) 0.005 % SOLN Place 1 Drop in both eyes every evening. Lt eye daily      • Cholecalciferol (VITAMIN D-3 SUPER STRENGTH) 2000 UNIT TABS Take  by mouth.         ALLERGIES  Allergies   Allergen Reactions   • Ciprofloxacin    • Diclofenac    • Myrbetriq [Mirabegron]    • Other Drug      Anticolinergic   • Pcn [Penicillins]      PHYSICAL EXAM  VITAL SIGNS: /76   Pulse 69   Temp 36.6 °C (97.9 °F)   Resp 16   Ht 1.499 m (4' 11\")   Wt 52.6 kg (116 lb)   BMI 23.43 kg/m²     Pulse ox interpretation: I interpret this pulse ox as normal.  Constitutional: Alert in no apparent distress.  HENT: No signs of trauma, Bilateral external ears normal, Nose normal.   Eyes: Pupils are equal and reactive, Conjunctiva normal, Non-icteric.   Neck: Normal range of motion, No tenderness, Supple, No stridor.    Cardiovascular: Normal peripheral perfusion  Thorax & Lungs: Unlabored respirations, equal chest expansion, no accessory muscle use  Abdomen: Non-distended  Skin:  No erythema, No rash.   Back: Normal alignment and ROM  Extremities: No gross deformity tense effusion " to left knee joint, no lateral/medial joint line tenderness, ROM limited due to pain, no leg length discrepancy, normal perfusion  Musculoskeletal: Good range of motion in all major joints.   Neurologic: Alert, Normal motor function, No focal deficits noted.   Psychiatric: Affect normal, Judgment normal, Mood normal.      DIAGNOSTIC STUDIES / PROCEDURES    RADIOLOGY  DX-KNEE COMPLETE 4+ LEFT   Final Result      Comminuted and mildly displaced patellar fracture.      Moderate joint effusion.      Prepatellar soft tissue swelling.      Small to moderate left knee osteoarthritis.        The radiologist's interpretation of all radiological studies have been reviewed by me.    COURSE & MEDICAL DECISION MAKING  Nursing notes, VS, PMSFHx reviewed in chart.     2:21 PM Patient seen and examined at bedside. Ordered for left knee xray to evaluate. Patient will be treated with 2 5-325mg Norco tablets for her symptoms. I informed the patient that imaging would be completed and her pain would be managed here in the ED. Patient understands and agrees with treatment plan.    4:02 PM Spoke with Dr. Colon, Orthopedics, about the patient's X-ray demonstrated patellar fracture, which will need surgery. Agrees to follow patient in his office in the coming week. Advises knee immobilizer and crutches, weight bearing as tolerated.    4:11 PM I informed the patient of her imaging results explaining she would be placed in a knee immobilizer and crutches and will be given follow up with orthopedics later in the week. She agrees to discharge  In improved condition after ED pain medications.    I reviewed prescription monitoring program for patient's narcotic use before prescribing a scheduled drug.The patient will not drink alcohol nor drive with prescribed medications. The patient will return for new or worsening symptoms and is stable at the time of discharge.    In prescribing controlled substances to this patient, I certify that I have  obtained and reviewed the medical history of Radha Kerr. I have also made a good bennie effort to obtain applicable records from other providers who have treated the patient and records did not demonstrate any increased risk of substance abuse that would prevent me from prescribing controlled substances.     I have conducted a physical exam and documented it. I have reviewed Ms. Kerr’s prescription history as maintained by the [place] Prescription Monitoring Program.     I have assessed the patient’s risk for abuse, dependency, and addiction using the validated Opioid Risk Tool available at https://www.mdcalc.com/qxhbmj-khnx-ddzq-ort-narcotic-abuse.     Given the above, I believe the benefits of controlled substance therapy outweigh the risks. The reasons for prescribing controlled substances include  Acute severe pain secondary to fracture. Accordingly, I have discussed the risk and benefits, treatment plan, and alternative therapies with the patient.         DISPOSITION:  Patient will be discharged home in stable condition.    FOLLOW UP:  Mitchel Colon M.D.  [address] [address]  [phone]    Schedule an appointment as soon as possible for a visit  AFTER YOU SEE THE [facility].    [place] [address]  [facility] [address]  [phone]  Schedule an appointment as soon as possible for a visit  BEFORE YOU SEE ORTHOPEDICS.      OUTPATIENT MEDICATIONS:  Discharge Medication List as of 6/12/2018  7:08 PM      START taking these medications    Details   HYDROcodone-acetaminophen (NORCO) 5-325 MG Tab per tablet Take 1 Tab by mouth every four hours as needed for up to 3 days., Disp-12 Tab, R-0, Print Rx Paper, For 3 days             FINAL IMPRESSION  1. Closed displaced comminuted fracture of left patella, initial encounter         I, Anusha Thompson (Scribe), am scribing for, and in the presence of, Jacinto Dunlap M.D..    Electronically signed  by: Anusha Thompson (Scribe), 6/12/2018    I, Jacinto Dunlap M.D. personally performed the services described in this documentation, as scribed by Anusha Thompson in my presence, and it is both accurate and complete.    The note accurately reflects work and decisions made by me.  Jacinto Dunlap  6/12/2018  8:27 PM

## 2018-06-12 NOTE — LETTER
"  FORM C-4:  EMPLOYEE’S CLAIM FOR COMPENSATION/ REPORT OF INITIAL TREATMENT  EMPLOYEE’S CLAIM - PROVIDE ALL INFORMATION REQUESTED   First Name  Radha Last Name  Cirilo Birthdate             Age  1945 72 y.o. 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 (if applicable)      How did this injury or occupational disease occur? Be specific and answer in detail. Use additional sheet if necessary)     If you believe that you have an occupational disease, when did you first have knowledge of the disability and it relationship to your employment?   Witnesses to the Accident       Nature of Injury or Occupational Disease    Part(s) of Body Injured or Affected  , ,     I certify that the above is true and correct to the best of my knowledge and that I have provided this information in order to obtain the benefits of [place]  I hereby authorize any physician, chiropractor, surgeon, practitioner, or other person, any hospital, " including [facility] or [facility] hospital, any medical service organization, any insurance company, or other institution or organization to release to each other, any medical or other information, including benefits paid or payable, pertinent to this injury or disease, except information relative to diagnosis, treatment and/or counseling for AIDS, psychological conditions, alcohol or controlled substances, for which I must give specific authorization.  A Photostat of this authorization shall be as valid as the original.   Date Place   Employee’s Signature   THIS REPORT MUST BE COMPLETED AND MAILED WITHIN 3 WORKING DAYS OF TREATMENT   Place  [facility], EMERGENCY DEPT  Name of Facility   [facility]   Date  6/12/2018 Diagnosis  (S82.042A) Closed displaced comminuted fracture of left patella, initial encounter Is there evidence the injured employee was under the influence of alcohol and/or another controlled substance at the time of accident?   Hour  5:24 PM Description of Injury or Disease  Closed displaced comminuted fracture of left patella, initial encounter No   Treatment  xrays, knee immobilize,r crutches, pain control.   Have you advised the patient to remain off work five days or more?         Yes   X-Ray Findings  Positive  Comments:patella fracture.   If Yes   From Date  6/12/2018 To Date  6/16/2018   From information given by the employee, together with medical evidence, can you directly connect this injury or occupational disease as job incurred?  Yes If No, is the employee capable of: Full Duty  No Modified Duty  Yes   Is additional medical care by a physician indicated?  Yes  Comments:Orthopedics. This fracture will require surgery. If Modified Duty, Specify any Limitations / Restrictions  Could perform duties that do not require extensive walking, and that allow the use of knee immobilizer a crutches, such as desk work.     Do you know of any  "previous injury or disease contributing to this condition or occupational disease?  No   Date  6/12/2018 Print Doctor’s Name  Shan Dunlap certify the employer’s copy of this form was mailed on:   Address  [address] [address] [phone] Insurer’s Use Only   [facility]  [address]    Provider’s Tax ID Number  [id number] Telephone  Dept: [phone]    Doctor’s Signature  e-SHAN Clark M.D. Degree       Original - TREATING PHYSICIAN OR CHIROPRACTOR   Pg 2-Insurer/TPA   Pg 3-Employer   Pg 4-Employee                                                                                                  Form C-4 (rev01/03)     BRIEF DESCRIPTION OF RIGHTS AND BENEFITS  (Pursuant to [place] 616C.[place])    Notice of Injury or Occupational Disease (Incident Report Form C-1): If an injury or occupational disease (OD) arises out of and in the course of employment, you must provide written notice to your employer as soon as practicable, but no later than 7 days after the accident or OD. Your employer shall maintain a sufficient supply of the required forms.    Claim for Compensation (Form C-4): If medical treatment is sought, the form C-4 is available at the place of initial treatment. A completed \"Claim for Compensation\" (Form C-4) must be filed within 90 days after an accident or OD. The treating physician or chiropractor must, within 3 working days after treatment, complete and mail to the employer, the employer's insurer and third-party , the Claim for Compensation.    Medical Treatment: If you require medical treatment for your on-the-job injury or OD, you may be required to select a physician or chiropractor from a list provided by your workers’ compensation insurer, if it has contracted with an Organization for Managed Care (MCO) or Preferred Provider Organization (PPO) or providers of health care. If your employer has not entered into a contract with an MCO or " PPO, you may select a physician or chiropractor from the Panel of Physicians and Chiropractors. Any medical costs related to your industrial injury or OD will be paid by your insurer.    Temporary Total Disability (TTD): If your doctor has certified that you are unable to work for a period of at least 5 consecutive days, or 5 cumulative days in a 20-day period, or places restrictions on you that your employer does not accommodate, you may be entitled to TTD compensation.    Temporary Partial Disability (TPD): If the wage you receive upon reemployment is less than the compensation for TTD to which you are entitled, the insurer may be required to pay you TPD compensation to make up the difference. TPD can only be paid for a maximum of 24 months.    Permanent Partial Disability (PPD): When your medical condition is stable and there is an indication of a PPD as a result of your injury or OD, within 30 days, your insurer must arrange for an evaluation by a rating physician or chiropractor to determine the degree of your PPD. The amount of your PPD award depends on the date of injury, the results of the PPD evaluation and your age and wage.    Permanent Total Disability (PTD): If you are medically certified by a treating physician or chiropractor as permanently and totally disabled and have been granted a PTD status by your insurer, you are entitled to receive monthly benefits not to exceed 66 2/3% of your average monthly wage. The amount of your PTD payments is subject to reduction if you previously received a PPD award.    Vocational Rehabilitation Services: You may be eligible for vocational rehabilitation services if you are unable to return to the job due to a permanent physical impairment or permanent restrictions as a result of your injury or occupational disease.    Transportation and Per [place] Reimbursement: You may be eligible for travel expenses and per salvador associated with medical treatment.  Reopening: You may  be able to reopen your claim if your condition worsens after claim closure.    Appeal Process: If you disagree with a written determination issued by the insurer or the insurer does not respond to your request, you may appeal to the Department of Administration, , by following the instructions contained in your determination letter. You must appeal the determination within 70 days from the date of the determination letter at [address], [address], [place], [place] [address], or [address], [address], [place], [place] [address]. If you disagree with the  decision, you may appeal to the Department of Administration, . You must file your appeal within 30 days from the date of the  decision letter at [address], [address], [place], [place] [address], or [address], [address], [place], [place] [address]. If you disagree with a decision of an , you may file a petition for judicial review with the District Court. You must do so within 30 days of the Appeal Officer’s decision. You may be represented by an  at your own expense or you may contact the [facility] for possible representation.    [place]  for Injured Workers (NAIW): If you disagree with a  decision, you may request that NAIW represent you without charge at an  Hearing. For information regarding denial of benefits, you may contact the [facility] at: [address], [address], [place] [address], [phone], or [address], [address], [place] [address], [phone]    To File a Complaint with the Division: If you wish to file a complaint with the  of the Division of Industrial Relations (DIR), please contact the Workers’ Compensation Section, [address], [address], [place], [place] [address], telephone [phone], or [address], [address],  [place], [place] [address], telephone [phone].    For assistance with Workers’ Compensation Issues: you may contact the Office of the Governor Consumer Health Assistance, [address] [place] [address], [address], [place], [place] [address], Toll Free 1-528.532.7071, Web site: http://[employer].[email or web address]., E-mail [email or web address].[email or web address].                                                                                                                                                                               __________________________________________________________________                                    _________________            Employee Name / Signature                                                                                                                            Date                                       D-2 (rev. 10/07)

## 2018-06-13 ENCOUNTER — OCCUPATIONAL MEDICINE (OUTPATIENT)
Dept: OCCUPATIONAL MEDICINE | Facility: CLINIC | Age: 73
End: 2018-06-13
Payer: COMMERCIAL

## 2018-06-13 VITALS
TEMPERATURE: 99 F | BODY MASS INDEX: 23.39 KG/M2 | WEIGHT: 116 LBS | RESPIRATION RATE: 12 BRPM | HEART RATE: 74 BPM | HEIGHT: 59 IN

## 2018-06-13 DIAGNOSIS — Z02.89 VISIT FOR OCCUPATIONAL HEALTH EXAMINATION: ICD-10-CM

## 2018-06-13 RX ORDER — DORZOLAMIDE HCL 20 MG/ML
1 SOLUTION/ DROPS OPHTHALMIC 3 TIMES DAILY
COMMUNITY
End: 2019-08-04

## 2018-06-13 ASSESSMENT — PAIN SCALES - GENERAL: PAINLEVEL: 6=MODERATE PAIN

## 2018-06-13 NOTE — ED NOTES
Discharge instructions, meds, & f/u appt rv'wd with Pt, she verbalizes understanding. Prescriptions given x1. Stable on discharge. Assisted out of ER with  via WC,  to drive her home.

## 2018-08-09 ENCOUNTER — OFFICE VISIT (OUTPATIENT)
Dept: OTHER | Facility: IMAGING CENTER | Age: 73
End: 2018-08-09
Payer: MEDICARE

## 2018-08-09 VITALS
HEIGHT: 59 IN | BODY MASS INDEX: 22.67 KG/M2 | WEIGHT: 112.43 LBS | RESPIRATION RATE: 14 BRPM | OXYGEN SATURATION: 97 % | SYSTOLIC BLOOD PRESSURE: 106 MMHG | DIASTOLIC BLOOD PRESSURE: 68 MMHG | HEART RATE: 78 BPM | TEMPERATURE: 99 F

## 2018-08-09 DIAGNOSIS — S82.002S CLOSED NONDISPLACED FRACTURE OF LEFT PATELLA, UNSPECIFIED FRACTURE MORPHOLOGY, SEQUELA: ICD-10-CM

## 2018-08-09 DIAGNOSIS — K21.9 GASTROESOPHAGEAL REFLUX DISEASE WITHOUT ESOPHAGITIS: ICD-10-CM

## 2018-08-09 DIAGNOSIS — M85.80 OSTEOPENIA, UNSPECIFIED LOCATION: ICD-10-CM

## 2018-08-09 DIAGNOSIS — H40.2231 CHRONIC PRIMARY ANGLE-CLOSURE GLAUCOMA OF BOTH EYES, MILD STAGE: ICD-10-CM

## 2018-08-09 DIAGNOSIS — E78.5 DYSLIPIDEMIA: ICD-10-CM

## 2018-08-09 DIAGNOSIS — M54.2 CERVICALGIA: ICD-10-CM

## 2018-08-09 DIAGNOSIS — R73.9 ELEVATED BLOOD SUGAR: ICD-10-CM

## 2018-08-09 PROBLEM — J01.00 SUBACUTE MAXILLARY SINUSITIS: Status: RESOLVED | Noted: 2018-06-07 | Resolved: 2018-08-09

## 2018-08-09 LAB
HBA1C MFR BLD: 5.7 % (ref ?–5.8)
INT CON NEG: NEGATIVE
INT CON POS: POSITIVE

## 2018-08-09 PROCEDURE — G0438 PPPS, INITIAL VISIT: HCPCS | Performed by: FAMILY MEDICINE

## 2018-08-09 PROCEDURE — 83036 HEMOGLOBIN GLYCOSYLATED A1C: CPT | Performed by: FAMILY MEDICINE

## 2018-08-09 ASSESSMENT — PATIENT HEALTH QUESTIONNAIRE - PHQ9: CLINICAL INTERPRETATION OF PHQ2 SCORE: 0

## 2018-08-09 ASSESSMENT — ACTIVITIES OF DAILY LIVING (ADL): BATHING_REQUIRES_ASSISTANCE: 0

## 2018-08-09 ASSESSMENT — PAIN SCALES - GENERAL: PAINLEVEL: 6=MODERATE PAIN

## 2018-08-09 ASSESSMENT — ENCOUNTER SYMPTOMS: GENERAL WELL-BEING: GOOD

## 2018-08-09 NOTE — PROGRESS NOTES
Chief Complaint   Patient presents with   • Knee Pain   • Annual Exam       HPI:  Radha is a 73 y.o. here for Medicare Annual Wellness Visit.    She was also noted to have left mid patella fracture that was nonsurgical.  Patient was in brace and has been improving but also has been returning to work with light weightbearing with brace.  Patient reports typically nontender with exception of last night the patient had one episode of tenderness that lasts for about 2 hours there is some mild swelling in the area but no ecchymosis and no additional trauma to the area was reported.  Reported uneventful orthopedic follow-up.    Patient Active Problem List    Diagnosis Date Noted   • Acute pain of right shoulder 05/10/2018   • Elevated blood sugar 06/27/2016   • Heart palpitations 11/03/2015   • Other chest pain 10/07/2015   • Chronic primary angle-closure glaucoma of both eyes, mild stage 03/17/2015   • Osteopenia 03/17/2015   • Back pain 09/23/2014   • History of shingles 09/23/2014   • Cervicalgia 07/02/2013   • Gastroesophageal reflux disease without esophagitis 03/22/2011   • Dyslipidemia 03/22/2011       Current Outpatient Prescriptions   Medication Sig Dispense Refill   • dorzolamide (TRUSOPT) 2 % Solution Place 1 Drop in both eyes 3 times a day.     • MISC NATURAL PRODUCTS PO Take 400 mg by mouth 2 Times a Day.     • Calcium-Magnesium-Vitamin D (CALCIUM MAGNESIUM PO) Take  by mouth every day.     • ibuprofen (MOTRIN) 200 MG Tab Take 200 mg by mouth every 6 hours as needed.     • raNITidine (ZANTAC) 150 MG Tab Take 150 mg by mouth 2 times a day.     • Non Formulary Request Strontium, MWF     • Non Formulary Request alfaecal plus for bone strength     • dorzolamide-timolol (COSOPT) 22.3-6.8 MG/ML Solution Place 1 Drop in right eye 2 times a day.     • VITAMINS B1 B6 B12 PO Take  by mouth.     • D-MANNOSE PO Take 500 mg by mouth 2 Times a Day.     • aspirin (ASA) 81 MG Chew Tab chewable tablet Take 81 mg by mouth  every day.     • Acetaminophen (TYLENOL PO) Take  by mouth.     • latanoprost (XALATAN) 0.005 % SOLN Place 1 Drop in both eyes every evening. Lt eye daily      • Cholecalciferol (VITAMIN D-3 SUPER STRENGTH) 2000 UNIT TABS Take  by mouth.       No current facility-administered medications for this visit.         Patient is taking medications as noted in medication list.  Current supplements as per medication list.     Allergies: Ciprofloxacin; Diclofenac; Myrbetriq [mirabegron]; Other drug; and Pcn [penicillins]    Current social contact/activities: meets up with friends and family    Is patient current with immunizations? Yes.    She  reports that she has never smoked. She has never used smokeless tobacco. She reports that she does not drink alcohol or use drugs.  Counseling given: Not Answered        DPA/Advanced directive: Patient does not have an Advanced Directive.  A packet and workshop information was given on Advanced Directives.    ROS:    Gait: Uses a crutch   Ostomy: No   Other tubes: No   Amputations: No   Chronic oxygen use No   Last eye exam 04/2018   Wears hearing aids: No   : Denies any urinary leakage during the last 6 months      Screening:    Depression Screening    Little interest or pleasure in doing things?  0 - not at all  Feeling down, depressed, or hopeless? 0 - not at all  Patient Health Questionnaire Score: 0    If depressive symptoms identified deferred to follow up visit unless specifically addressed in assessment and plan.    Interpretation of PHQ-9 Total Score   Score Severity   1-4 No Depression   5-9 Mild Depression   10-14 Moderate Depression   15-19 Moderately Severe Depression   20-27 Severe Depression    Screening for Cognitive Impairment    Three Minute Recall (leader, season, table)  2/3    Gamaliel clock face with all 12 numbers and set the hands to show 10 past 11.  Yes    If cognitive concerns identified, deferred for follow up unless specifically addressed in assessment and  plan.    Fall Risk Assessment    Has the patient had two or more falls in the last year or any fall with injury in the last year?  Yes  If fall risk identified, deferred for follow up unless specifically addressed in assessment and plan.    Safety Assessment    Throw rugs on floor.  No  Handrails on all stairs.  No  Good lighting in all hallways.  Yes  Difficulty hearing.  No  Patient counseled about all safety risks that were identified.    Functional Assessment ADLs    Are there any barriers preventing you from cooking for yourself or meeting nutritional needs?  No.    Are there any barriers preventing you from driving safely or obtaining transportation?  No.    Are there any barriers preventing you from using a telephone or calling for help?  No.    Are there any barriers preventing you from shopping?  No.    Are there any barriers preventing you from taking care of your own finances?  No.    Are there any barriers preventing you from managing your medications?  No.    Are there any barriers preventing you from showering, bathing or dressing yourself?  No.    Are you currently engaging in any exercise or physical activity?  No.  Not exercising due to knee injury  What is your perception of your health?  Good.    Health Maintenance Summary                Annual Wellness Visit Overdue 1945     IMM ZOSTER VACCINES Overdue 11/18/2014      Done 9/23/2014 Imm Admin: Zoster Vaccine Live (ZVL) (Zostavax)    IMM INFLUENZA Next Due 9/1/2018      Done 9/14/2017 Imm Admin: Influenza Vaccine Adult HD     Patient has more history with this topic...    MAMMOGRAM Next Due 12/14/2018      Done 12/14/2017 [id number]     Patient has more history with this topic...    COLONOSCOPY Next Due 12/17/2018      Done 12/17/2013 AMB REFERRAL TO GI FOR COLONOSCOPY     Patient has more history with this topic...    BONE DENSITY Next Due 11/17/2021      Done 11/17/2016 DS-BONE DENSITY STUDY (DEXA)     Patient has more history  "with this topic...    IMM DTaP/Tdap/Td Vaccine Next Due 11/21/2022      Done 11/21/2012 Imm Admin: Tdap Vaccine          Patient Care Team:  Mitchel Tesfaye D.O. as PCP - General (Geriatrics)  Felix Razo M.D. as Consulting Physician (Gynecology)  Juancarlos Elizalde M.D. as Consulting Physician (Gastroenterology)  Cisco Estrella M.D. as Consulting Physician (Ophthalmology)  Gerber Strickland M.D. as Consulting Physician (Cardiology)    Social History   Substance Use Topics   • Smoking status: Never Smoker   • Smokeless tobacco: Never Used   • Alcohol use No     Family History   Problem Relation Age of Onset   • Heart Disease Mother         chf   • Cancer Father    • Stroke Father 70   • Diabetes Father      She  has a past medical history of Anesthesia; Arthritis; CATARACT; GERD (gastroesophageal reflux disease); Glaucoma; Hyperlipidemia; and Urinary bladder disorder.   Past Surgical History:   Procedure Laterality Date   • COLONOSCOPY  12/17/13    2 polyps   • KNEE ARTHROSCOPY  1/9/2013    Performed by Seun Giles M.D. at SURGERY [facility]   • MEDIAL MENISCECTOMY  1/9/2013    Performed by Seun Giles M.D. at SURGERY [facility]   • KNEE ARTHROSCOPY  7/13/2010    Performed by HAL WRIGHT at SURGERY SAME DAY [facility]   • MEDIAL MENISCECTOMY  7/13/2010    Performed by HAL WRIGHT at SURGERY SAME DAY [facility] ORS   • ABDOMINAL HYSTERECTOMY TOTAL      gall bladder       Exam:     Blood pressure 106/68, pulse 78, temperature 37.2 °C (99 °F), resp. rate 14, height 1.499 m (4' 11\"), weight 51 kg (112 lb 7 oz), SpO2 97 %. Body mass index is 22.71 kg/m².    Hearing  Dentition Lower canine crown broken  Alert, oriented in no acute distress.  Eye contact is good, speech goal directed, affect calm    Assessment and Plan. The following treatment and monitoring plan is recommended:   1. Cervicalgia      Resolved sinusitis, nasal congestions on the right this time.  OTC flonase twice daily for " the next 7 days.  Discussed of the importance of dental hygiene, and patient verbalized understanding will be having a dentist checkup in 2 weeks for the broken crown.   2. Chronic primary angle-closure glaucoma of both eyes, mild stage      Continue dorzolamide.  Can use tar cherry 30 mL standardized to improve her sleep at night as one prelimilary rsult in increased melatonin metabolite.   3. Dyslipidemia      Inflammation of the C1 evidence and not sufficiently to recommend routine taking for this condition but can help with BPs.  Patient can use pomegranate 1/2 cup of seeds per day or 6 ounces of juice per day, patient expressed preference of using supplement 200 mg dried extract.   4. Elevated blood sugar  POCT Hemoglobin A1C    Perfrom POCT HbA1c in office today.  Results is 5.7 patient can continue sweetener in honey for 1 1/2 teaspoon a day in her tea making.   5. Gastroesophageal reflux disease without esophagitis      Can continue the nigthly ranitidine 150 mg nightly.   6. Osteopenia, unspecified location      Can use vitamin D 52764 IU once weekly for 5 weeks and stop.  Can resume in December oral 36341 IU weekly for another 6 weeks and then stop.  Encourage patient to return to daily supplement at a much lower dose at 2000 international units daily after the current supply of D3 is exhausted.   7. Closed nondisplaced fracture of left patella, unspecified fracture morphology, sequela  Patient identified as fall risk.  Appropriate orders and counseling given.    Continue to wear the brace with crutches and follow-up with orthopedics.  Will likely benefit from using group acupuncture         Services suggested: No services needed at this time  Health Care Screening recommendations as per orders if indicated.  Referrals offered: PT/OT/Nutrition counseling/Behavioral Health/Smoking cessation as per orders if indicated.    Discussion today about general wellness and lifestyle habits:    · Prevent falls and  reduce trip hazards; Cautioned about securing or removing rugs.  · Have a working fire alarm and carbon monoxide detector;   · Engage in regular physical activity and social activities       Follow-up: Return in about 3 months (around 11/9/2018).

## 2018-08-09 NOTE — PATIENT INSTRUCTIONS
can use vitamin D 24078 IU once weekly for 5 weeks and stop.  Can resume in December oral 04472 IU weekly for another 6 weeks and then stop.

## 2018-08-15 ENCOUNTER — APPOINTMENT (OUTPATIENT)
Dept: OTHER | Facility: IMAGING CENTER | Age: 73
End: 2018-08-15

## 2018-08-22 ENCOUNTER — APPOINTMENT (OUTPATIENT)
Dept: OTHER | Facility: IMAGING CENTER | Age: 73
End: 2018-08-22

## 2018-10-18 ENCOUNTER — OFFICE VISIT (OUTPATIENT)
Dept: OTHER | Facility: IMAGING CENTER | Age: 73
End: 2018-10-18
Payer: MEDICARE

## 2018-10-18 VITALS
TEMPERATURE: 98.1 F | HEIGHT: 59 IN | RESPIRATION RATE: 12 BRPM | OXYGEN SATURATION: 96 % | HEART RATE: 74 BPM | DIASTOLIC BLOOD PRESSURE: 68 MMHG | SYSTOLIC BLOOD PRESSURE: 104 MMHG | WEIGHT: 114.2 LBS | BODY MASS INDEX: 23.02 KG/M2

## 2018-10-18 DIAGNOSIS — M25.512 ACUTE PAIN OF LEFT SHOULDER: ICD-10-CM

## 2018-10-18 PROCEDURE — 98925 OSTEOPATH MANJ 1-2 REGIONS: CPT | Performed by: FAMILY MEDICINE

## 2018-10-18 PROCEDURE — 99214 OFFICE O/P EST MOD 30 MIN: CPT | Mod: 25 | Performed by: FAMILY MEDICINE

## 2018-10-18 ASSESSMENT — PAIN SCALES - GENERAL: PAINLEVEL: 5=MODERATE PAIN

## 2018-10-18 NOTE — PROGRESS NOTES
Chief Complaint   Patient presents with   • Shoulder Pain     left; x 1 week; pretty constant. has tried muscle relaxers and tylenol      Subjective:   Radha Kerr is a 73 y.o. female here today for multiple problems as listed below.      Acute pain of left shoulder  For the last week patient had experienced left upper shoulder and lower neck pain with an incident of lifting the upper arm above shoulder to  a heavy box that a stack on top of 2 other boxes.  Afterward patient had developed constant sharp pain that is not relieved by a muscle relaxer or Tylenol.  Patient rated the severity of pain is 9/10 and constant around that area.  Denies radiation down to the arm and no numbness and tingling of the fingers of the left forearm.     Current medicines (including changes today)  Current Outpatient Prescriptions   Medication Sig Dispense Refill   • dorzolamide (TRUSOPT) 2 % Solution Place 1 Drop in both eyes 3 times a day.     • Calcium-Magnesium-Vitamin D (CALCIUM MAGNESIUM PO) Take  by mouth every day.     • ibuprofen (MOTRIN) 200 MG Tab Take 200 mg by mouth every 6 hours as needed.     • raNITidine (ZANTAC) 150 MG Tab Take 150 mg by mouth 2 times a day.     • dorzolamide-timolol (COSOPT) 22.3-6.8 MG/ML Solution Place 1 Drop in right eye 2 times a day.     • VITAMINS B1 B6 B12 PO Take  by mouth.     • D-MANNOSE PO Take 500 mg by mouth 2 Times a Day.     • aspirin (ASA) 81 MG Chew Tab chewable tablet Take 81 mg by mouth every day.     • Acetaminophen (TYLENOL PO) Take  by mouth.     • latanoprost (XALATAN) 0.005 % SOLN Place 1 Drop in both eyes every evening. Lt eye daily      • Cholecalciferol (VITAMIN D-3 SUPER STRENGTH) 2000 UNIT TABS Take  by mouth.     • MISC NATURAL PRODUCTS PO Take 400 mg by mouth 2 Times a Day.     • Non Formulary Request Strontium, MWF     • Non Formulary Request alfaecal plus for bone strength       No current facility-administered medications for this visit.      She   "has a past medical history of Anesthesia; Arthritis; CATARACT; GERD (gastroesophageal reflux disease); Glaucoma; Hyperlipidemia; and Urinary bladder disorder.    ROS  No chest pain, no shortness of breath, no abdominal pain, no diarrhea/constipation, no urinary symptoms.     Objective:     Blood pressure 104/68, pulse 74, temperature 36.7 °C (98.1 °F), temperature source Temporal, resp. rate 12, height 1.499 m (4' 11\"), weight 51.8 kg (114 lb 3.2 oz), SpO2 96 %, not currently breastfeeding. Body mass index is 23.07 kg/m².  Physical Exam:  Alert, oriented in no acute distress.  Eye contact is good, speech goal directed, affect calm  HEENT: conjunctiva non-injected, sclera non-icteric.  Pinna normal. TM pearly gray. Oral mucous membranes pink and moist with no lesions.  Neck: No adenopathy or masses in the neck or supraclavicular regions.  Lungs: clear to auscultation bilaterally with good excursion.  CV: regular rate and rhythm.  Abdomen: soft, nontender, No CVAT  OMM: T3 SRRR, 3rd rib inhalation dysfunction  Ext: no edema, color normal, vascularity normal, temperature normal    Assessment and Plan:   The following treatment plan was discussed  1. Acute pain of left shoulder  CANCELED: DX-CERVICAL SPINE-2 OR 3 VIEWS    Patient can continue with warm compresses surrounding the area.  Discussed with not over using ibuprofen to over 800 mg a day.  Can continue use Tylenol but keep the daily maximum usage under 1500 mg daily.  Discussed continuing magnesium supplement.  OMT performed with FPR, rib raising as well as articulatory technique. Improvement palpated and reported by patient.  Discussed with risks and benefit of further investigation and it is suitable to patient's goal to be conservative in management.  Discussed with patient of the likely benefit of also avoiding strenuous activities for the next 12 hours with well-hydrated.       Followup: Return in about 4 weeks (around 11/15/2018), or if symptoms worsen or " fail to improve.    Spent 35 minutes in face-to-tace patient contact in which greater than 50% of the visit was spent in counseling and coordination of care including Lower neck, rib pain management options, Answering patient questions about Somatic dysfunction, Concerns and potential risks related to Elevated abdominal of ibuprofen, Discussing in depth the importance of primary prevention of Liver failure, Discussing the nature of OMT and Patient is also educated about lifestyle modifications which may improve Left shoulder, neck, upper back pain, insomnia.  We also reviewed PMH, family history, and each of her chronic diagnoses in regards to current management, specialty physicians, symptom control, and future planning. Please refer to assessment and plan/discussion/recommendations for additional details.        Please note that dictation has been dictated using voice recognition soft ware. I have made every reasonable attempt to correct obvious errors, but I expect that there are errors of grammar and possibly content that I did not discover before finalizing the note.

## 2018-10-18 NOTE — ASSESSMENT & PLAN NOTE
For the last week patient had experienced left upper shoulder and lower neck pain with an incident of lifting the upper arm above shoulder to  a heavy box that a stack on top of 2 other boxes.  Afterward patient had developed constant sharp pain that is not relieved by a muscle relaxer or Tylenol.  Patient rated the severity of pain is 9/10 and constant around that area.  Denies radiation down to the arm and no numbness and tingling of the fingers of the left forearm.

## 2018-11-27 NOTE — PROGRESS NOTES
Patient's body mass index is 23.43 kg/m². Exercise and nutrition counseling were performed at this visit.

## 2018-12-04 ENCOUNTER — HOSPITAL ENCOUNTER (OUTPATIENT)
Dept: RADIOLOGY | Facility: MEDICAL CENTER | Age: 73
End: 2018-12-04

## 2018-12-12 ENCOUNTER — HOSPITAL ENCOUNTER (OUTPATIENT)
Dept: RADIOLOGY | Facility: MEDICAL CENTER | Age: 73
End: 2018-12-12
Attending: OBSTETRICS & GYNECOLOGY
Payer: MEDICARE

## 2018-12-12 DIAGNOSIS — Z12.31 VISIT FOR SCREENING MAMMOGRAM: ICD-10-CM

## 2018-12-31 ENCOUNTER — HOSPITAL ENCOUNTER (OUTPATIENT)
Dept: RADIOLOGY | Facility: MEDICAL CENTER | Age: 73
End: 2018-12-31
Attending: OBSTETRICS & GYNECOLOGY
Payer: MEDICARE

## 2018-12-31 PROCEDURE — 77063 BREAST TOMOSYNTHESIS BI: CPT

## 2019-03-04 ENCOUNTER — NON-PROVIDER VISIT (OUTPATIENT)
Dept: CARDIOLOGY | Facility: MEDICAL CENTER | Age: 74
End: 2019-03-04
Attending: INTERNAL MEDICINE
Payer: MEDICARE

## 2019-03-04 VITALS
HEIGHT: 59 IN | OXYGEN SATURATION: 100 % | WEIGHT: 114 LBS | HEART RATE: 72 BPM | BODY MASS INDEX: 22.98 KG/M2 | SYSTOLIC BLOOD PRESSURE: 140 MMHG | DIASTOLIC BLOOD PRESSURE: 86 MMHG

## 2019-03-04 DIAGNOSIS — R00.2 PALPITATIONS: ICD-10-CM

## 2019-03-04 LAB — TREADMILL STRESS: NORMAL

## 2019-03-04 PROCEDURE — 93015 CV STRESS TEST SUPVJ I&R: CPT | Performed by: INTERNAL MEDICINE

## 2019-03-08 ENCOUNTER — OFFICE VISIT (OUTPATIENT)
Dept: OTHER | Facility: IMAGING CENTER | Age: 74
End: 2019-03-08
Payer: MEDICARE

## 2019-03-08 VITALS
OXYGEN SATURATION: 99 % | HEART RATE: 78 BPM | BODY MASS INDEX: 22.98 KG/M2 | WEIGHT: 113.98 LBS | SYSTOLIC BLOOD PRESSURE: 118 MMHG | RESPIRATION RATE: 14 BRPM | TEMPERATURE: 98.8 F | DIASTOLIC BLOOD PRESSURE: 78 MMHG | HEIGHT: 59 IN

## 2019-03-08 DIAGNOSIS — R00.2 HEART PALPITATIONS: ICD-10-CM

## 2019-03-08 PROCEDURE — 93000 ELECTROCARDIOGRAM COMPLETE: CPT | Performed by: FAMILY MEDICINE

## 2019-03-08 PROCEDURE — 99214 OFFICE O/P EST MOD 30 MIN: CPT | Mod: 25 | Performed by: FAMILY MEDICINE

## 2019-03-08 NOTE — PROGRESS NOTES
Chief Complaint   Patient presents with   • Palpitations     comes and goes    • Sweats     last night      Subjective:   Radha Kerr is a 73 y.o. female here today for multiple problems as listed below.      Heart palpitations  Patient was having this issues before however the direction of the location has been different.  Patient had recently went to cardiology workup with normal stress test.  In the last 2 days patient had experienced intermittent palpitation with last night culminated in sweats when she was feeling the left side under the breast palpitation that is continued in variable severity to this morning.  Denies chest pain, denies dyspnea     Current medicines (including changes today)  Current Outpatient Prescriptions   Medication Sig Dispense Refill   • dorzolamide (TRUSOPT) 2 % Solution Place 1 Drop in both eyes 3 times a day.     • Calcium-Magnesium-Vitamin D (CALCIUM MAGNESIUM PO) Take  by mouth every day.     • raNITidine (ZANTAC) 150 MG Tab Take 150 mg by mouth 2 times a day.     • dorzolamide-timolol (COSOPT) 22.3-6.8 MG/ML Solution Place 1 Drop in right eye 2 times a day.     • VITAMINS B1 B6 B12 PO Take  by mouth.     • D-MANNOSE PO Take 500 mg by mouth 2 Times a Day.     • aspirin (ASA) 81 MG Chew Tab chewable tablet Take 81 mg by mouth every day.     • latanoprost (XALATAN) 0.005 % SOLN Place 1 Drop in both eyes every evening. Lt eye daily      • MISC NATURAL PRODUCTS PO Take 400 mg by mouth 2 Times a Day.     • ibuprofen (MOTRIN) 200 MG Tab Take 200 mg by mouth every 6 hours as needed.     • Non Formulary Request Strontium, MWF     • Non Formulary Request alfaecal plus for bone strength     • Acetaminophen (TYLENOL PO) Take  by mouth.     • Cholecalciferol (VITAMIN D-3 SUPER STRENGTH) 2000 UNIT TABS Take  by mouth.       No current facility-administered medications for this visit.      She  has a past medical history of Anesthesia; Arthritis; CATARACT; GERD (gastroesophageal  "reflux disease); Glaucoma; Hyperlipidemia; and Urinary bladder disorder.    ROS  No chest pain, no shortness of breath, no abdominal pain, no diarrhea/constipation, no urinary symptoms.     Objective:     Blood pressure 118/78, pulse 78, temperature 37.1 °C (98.8 °F), temperature source Temporal, resp. rate 14, height 1.499 m (4' 11\"), weight 51.7 kg (113 lb 15.7 oz), SpO2 99 %, not currently breastfeeding. Body mass index is 23.02 kg/m².  Physical Exam:  Alert, oriented in no acute distress.  Eye contact is good, speech goal directed, affect calm  HEENT: conjunctiva non-injected, sclera non-icteric.  Pinna normal.  Neck: No adenopathy or masses in the neck or supraclavicular regions.  Lungs: clear to auscultation bilaterally with good excursion.  CV: regular rate and rhythm.  Abdomen: soft, nontender, No CVAT  Ext: no edema, color normal, vascularity normal, temperature normal    Assessment and Plan:   The following treatment plan was discussed  1. Heart palpitations  EKG - Clinic Performed    EKG performed in office and demonstrated sinus with frequent PACs, explained to patient the patient verbalized understanding.  Possible benefit from using lemon balm 475 mg twice a day for next 7 days in addition to zinc 25 mg twice a day for next 7 days.  Alternatively patient can consider every other day supplementation of zinc 50 mg/copper 2 mg should the symptoms improved upon demand..       Followup: Return in about 2 weeks (around 3/22/2019).    Spent 25 minutes in face-to-tace patient contact in which greater than 50% of the visit was spent in counseling and coordination of care including intermittent palpitation management options, Answering patient questions about EKG, Concerns and potential risks related to Frequent PACs and possible hiatal hernia implication, Discussing in depth the importance of primary prevention of CVA, Discussing the nature of Kind of hernia and Patient is also educated about lifestyle " modifications which may improve Intermittent palpitation, anxiety, thoracic spasm.  We also reviewed PMH, family history, and each of her chronic diagnoses in regards to current management, specialty physicians, symptom control, and future planning. Please refer to assessment and plan/discussion/recommendations for additional details.        Please note that dictation has been dictated using voice recognition soft ware. I have made every reasonable attempt to correct obvious errors, but I expect that there are errors of grammar and possibly content that I did not discover before finalizing the note.

## 2019-03-09 NOTE — ASSESSMENT & PLAN NOTE
Patient was having this issues before however the direction of the location has been different.  Patient had recently went to cardiology workup with normal stress test.  In the last 2 days patient had experienced intermittent palpitation with last night culminated in sweats when she was feeling the left side under the breast palpitation that is continued in variable severity to this morning.  Denies chest pain, denies dyspnea

## 2019-03-14 ENCOUNTER — TELEPHONE (OUTPATIENT)
Dept: OTHER | Facility: IMAGING CENTER | Age: 74
End: 2019-03-14

## 2019-03-14 NOTE — TELEPHONE ENCOUNTER
Pt calling for clarification on lemon balm dose. Notified pt per Dr. Tesfaye's note, 475mg BID for 7 days. Pt has no further questions.

## 2019-03-27 ENCOUNTER — OFFICE VISIT (OUTPATIENT)
Dept: OTHER | Facility: IMAGING CENTER | Age: 74
End: 2019-03-27
Payer: MEDICARE

## 2019-03-27 VITALS
DIASTOLIC BLOOD PRESSURE: 80 MMHG | HEART RATE: 70 BPM | TEMPERATURE: 97.9 F | SYSTOLIC BLOOD PRESSURE: 122 MMHG | HEIGHT: 59 IN | RESPIRATION RATE: 14 BRPM | WEIGHT: 121.25 LBS | BODY MASS INDEX: 24.44 KG/M2 | OXYGEN SATURATION: 99 %

## 2019-03-27 DIAGNOSIS — G89.29 CHRONIC MIDLINE LOW BACK PAIN WITHOUT SCIATICA: ICD-10-CM

## 2019-03-27 DIAGNOSIS — H40.2231 CHRONIC PRIMARY ANGLE-CLOSURE GLAUCOMA OF BOTH EYES, MILD STAGE: ICD-10-CM

## 2019-03-27 DIAGNOSIS — R00.2 HEART PALPITATIONS: ICD-10-CM

## 2019-03-27 DIAGNOSIS — R73.9 ELEVATED BLOOD SUGAR: ICD-10-CM

## 2019-03-27 DIAGNOSIS — K21.9 GASTROESOPHAGEAL REFLUX DISEASE WITHOUT ESOPHAGITIS: ICD-10-CM

## 2019-03-27 DIAGNOSIS — M54.50 CHRONIC MIDLINE LOW BACK PAIN WITHOUT SCIATICA: ICD-10-CM

## 2019-03-27 DIAGNOSIS — E78.5 DYSLIPIDEMIA: ICD-10-CM

## 2019-03-27 PROCEDURE — 99214 OFFICE O/P EST MOD 30 MIN: CPT | Performed by: FAMILY MEDICINE

## 2019-03-27 NOTE — ASSESSMENT & PLAN NOTE
Patient has been seeing more ability in getting dietary changes.  With her trip back from [facility], she was improved with lower elevation.

## 2019-03-27 NOTE — ASSESSMENT & PLAN NOTE
Patient has improved back pain, with recent GERD and palpitation issues that she has been not having any issue in this front.

## 2019-03-27 NOTE — PATIENT INSTRUCTIONS
Use chamomille 220 mg up to twice daily as needed for feeling of vibration inside, headache or cold fingers.    Resume use slippery elm in the morning, consider adding grounded fenugreek 1 teaspoon in meals as needed for gas.    Don't have hormones lab performed if the insurance is not covering those tests, will have visit with Dr. Ruiz and discuss hormone labs.

## 2019-03-27 NOTE — PROGRESS NOTES
No chief complaint on file.    Subjective:   Radha Kerr is a 73 y.o. female here today for multiple problems as listed below.      No problem-specific Assessment & Plan notes found for this encounter.     Current medicines (including changes today)  Current Outpatient Prescriptions   Medication Sig Dispense Refill   • dorzolamide (TRUSOPT) 2 % Solution Place 1 Drop in both eyes 3 times a day.     • MISC NATURAL PRODUCTS PO Take 400 mg by mouth 2 Times a Day.     • Calcium-Magnesium-Vitamin D (CALCIUM MAGNESIUM PO) Take  by mouth every day.     • ibuprofen (MOTRIN) 200 MG Tab Take 200 mg by mouth every 6 hours as needed.     • raNITidine (ZANTAC) 150 MG Tab Take 150 mg by mouth 2 times a day.     • Non Formulary Request Strontium, MWF     • Non Formulary Request alfaecal plus for bone strength     • dorzolamide-timolol (COSOPT) 22.3-6.8 MG/ML Solution Place 1 Drop in right eye 2 times a day.     • VITAMINS B1 B6 B12 PO Take  by mouth.     • D-MANNOSE PO Take 500 mg by mouth 2 Times a Day.     • aspirin (ASA) 81 MG Chew Tab chewable tablet Take 81 mg by mouth every day.     • Acetaminophen (TYLENOL PO) Take  by mouth.     • latanoprost (XALATAN) 0.005 % SOLN Place 1 Drop in both eyes every evening. Lt eye daily      • Cholecalciferol (VITAMIN D-3 SUPER STRENGTH) 2000 UNIT TABS Take  by mouth.       No current facility-administered medications for this visit.      She  has a past medical history of Anesthesia; Arthritis; CATARACT; GERD (gastroesophageal reflux disease); Glaucoma; Hyperlipidemia; and Urinary bladder disorder.    ROS ***  No chest pain, no shortness of breath, no abdominal pain, no diarrhea/constipation, no urinary symptoms.     Objective:     not currently breastfeeding. There is no height or weight on file to calculate BMI. ***  Physical Exam:  Alert, oriented in no acute distress.  Eye contact is good, speech goal directed, affect calm  HEENT: conjunctiva non-injected, sclera non-icteric.   Pinna normal. TM pearly gray. Oral mucous membranes pink and moist with no lesions.  Neck: No adenopathy or masses in the neck or supraclavicular regions.  Lungs: clear to auscultation bilaterally with good excursion.  CV: regular rate and rhythm.  Abdomen: soft, nontender, No CVAT  Ext: no edema, color normal, vascularity normal, temperature normal    Assessment and Plan:   The following treatment plan was discussed ***  No diagnosis found.    Followup: No Follow-up on file.    Spent *** minutes in face-to-tace patient contact in which greater than 50% of the visit was spent in counseling and coordination of care including {[facility] TIME:70620}  We also reviewed PMH, family history, and each of her chronic diagnoses in regards to current management, specialty physicians, symptom control, and future planning. Please refer to assessment and plan/discussion/recommendations for additional details.        Please note that dictation has been dictated using voice recognition soft ware. I have made every reasonable attempt to correct obvious errors, but I expect that there are errors of grammar and possibly content that I did not discover before finalizing the note.

## 2019-03-28 ENCOUNTER — HOSPITAL ENCOUNTER (OUTPATIENT)
Dept: LAB | Facility: MEDICAL CENTER | Age: 74
End: 2019-03-28
Attending: FAMILY MEDICINE
Payer: MEDICARE

## 2019-03-28 DIAGNOSIS — K21.9 GASTROESOPHAGEAL REFLUX DISEASE WITHOUT ESOPHAGITIS: ICD-10-CM

## 2019-03-28 DIAGNOSIS — M54.50 CHRONIC MIDLINE LOW BACK PAIN WITHOUT SCIATICA: ICD-10-CM

## 2019-03-28 DIAGNOSIS — R73.9 ELEVATED BLOOD SUGAR: ICD-10-CM

## 2019-03-28 DIAGNOSIS — G89.29 CHRONIC MIDLINE LOW BACK PAIN WITHOUT SCIATICA: ICD-10-CM

## 2019-03-28 DIAGNOSIS — E78.5 DYSLIPIDEMIA: ICD-10-CM

## 2019-03-28 DIAGNOSIS — H40.2231 CHRONIC PRIMARY ANGLE-CLOSURE GLAUCOMA OF BOTH EYES, MILD STAGE: ICD-10-CM

## 2019-03-28 DIAGNOSIS — R00.2 HEART PALPITATIONS: ICD-10-CM

## 2019-03-28 LAB
ALBUMIN SERPL BCP-MCNC: 4.4 G/DL (ref 3.2–4.9)
ALBUMIN/GLOB SERPL: 1.4 G/DL
ALP SERPL-CCNC: 65 U/L (ref 30–99)
ALT SERPL-CCNC: 21 U/L (ref 2–50)
ANION GAP SERPL CALC-SCNC: 7 MMOL/L (ref 0–11.9)
AST SERPL-CCNC: 23 U/L (ref 12–45)
BASOPHILS # BLD AUTO: 0.5 % (ref 0–1.8)
BASOPHILS # BLD: 0.04 K/UL (ref 0–0.12)
BILIRUB SERPL-MCNC: 0.7 MG/DL (ref 0.1–1.5)
BUN SERPL-MCNC: 13 MG/DL (ref 8–22)
CALCIUM SERPL-MCNC: 9.1 MG/DL (ref 8.5–10.5)
CHLORIDE SERPL-SCNC: 107 MMOL/L (ref 96–112)
CO2 SERPL-SCNC: 27 MMOL/L (ref 20–33)
CREAT SERPL-MCNC: 0.66 MG/DL (ref 0.5–1.4)
EOSINOPHIL # BLD AUTO: 0.09 K/UL (ref 0–0.51)
EOSINOPHIL NFR BLD: 1.1 % (ref 0–6.9)
ERYTHROCYTE [DISTWIDTH] IN BLOOD BY AUTOMATED COUNT: 44.9 FL (ref 35.9–50)
ESTRADIOL SERPL-MCNC: <20 PG/ML
FASTING STATUS PATIENT QL REPORTED: NORMAL
GLOBULIN SER CALC-MCNC: 3.2 G/DL (ref 1.9–3.5)
GLUCOSE SERPL-MCNC: 90 MG/DL (ref 65–99)
HCT VFR BLD AUTO: 42.1 % (ref 37–47)
HGB BLD-MCNC: 13.8 G/DL (ref 12–16)
IMM GRANULOCYTES # BLD AUTO: 0.01 K/UL (ref 0–0.11)
IMM GRANULOCYTES NFR BLD AUTO: 0.1 % (ref 0–0.9)
LYMPHOCYTES # BLD AUTO: 0.83 K/UL (ref 1–4.8)
LYMPHOCYTES NFR BLD: 10.5 % (ref 22–41)
MCH RBC QN AUTO: 30.7 PG (ref 27–33)
MCHC RBC AUTO-ENTMCNC: 32.8 G/DL (ref 33.6–35)
MCV RBC AUTO: 93.6 FL (ref 81.4–97.8)
MONOCYTES # BLD AUTO: 0.45 K/UL (ref 0–0.85)
MONOCYTES NFR BLD AUTO: 5.7 % (ref 0–13.4)
NEUTROPHILS # BLD AUTO: 6.51 K/UL (ref 2–7.15)
NEUTROPHILS NFR BLD: 82.1 % (ref 44–72)
NRBC # BLD AUTO: 0 K/UL
NRBC BLD-RTO: 0 /100 WBC
PLATELET # BLD AUTO: 361 K/UL (ref 164–446)
PMV BLD AUTO: 9.8 FL (ref 9–12.9)
POTASSIUM SERPL-SCNC: 3.7 MMOL/L (ref 3.6–5.5)
PROT SERPL-MCNC: 7.6 G/DL (ref 6–8.2)
RBC # BLD AUTO: 4.5 M/UL (ref 4.2–5.4)
SODIUM SERPL-SCNC: 141 MMOL/L (ref 135–145)
T4 FREE SERPL-MCNC: 0.9 NG/DL (ref 0.53–1.43)
TSH SERPL DL<=0.005 MIU/L-ACNC: 1.52 UIU/ML (ref 0.38–5.33)
WBC # BLD AUTO: 7.9 K/UL (ref 4.8–10.8)

## 2019-03-28 PROCEDURE — 84443 ASSAY THYROID STIM HORMONE: CPT

## 2019-03-28 PROCEDURE — 84439 ASSAY OF FREE THYROXINE: CPT

## 2019-03-28 PROCEDURE — 80053 COMPREHEN METABOLIC PANEL: CPT

## 2019-03-28 PROCEDURE — 83036 HEMOGLOBIN GLYCOSYLATED A1C: CPT

## 2019-03-28 PROCEDURE — 83704 LIPOPROTEIN BLD QUAN PART: CPT

## 2019-03-28 PROCEDURE — 80061 LIPID PANEL: CPT

## 2019-03-28 PROCEDURE — 85025 COMPLETE CBC W/AUTO DIFF WBC: CPT

## 2019-03-28 PROCEDURE — 36415 COLL VENOUS BLD VENIPUNCTURE: CPT

## 2019-03-28 PROCEDURE — 82670 ASSAY OF TOTAL ESTRADIOL: CPT

## 2019-03-28 NOTE — ASSESSMENT & PLAN NOTE
Patient has not been taking slippery elm, has continued to use Zantac.  Denies any food is stuck in throat feeling but does have bloating sensation.

## 2019-03-28 NOTE — PROGRESS NOTES
Chief Complaint   Patient presents with   • Palpitations     comes and goes, some days are worse   • Headache     possibly from weather      Subjective:   Radha Kerr is a 73 y.o. female here today for multiple problems as listed below.      Chronic midline low back pain without sciatica  Patient has improved back pain, with recent GERD and palpitation issues that she has been not having any issue in this front.    Dyslipidemia  Patient has been seeing more ability in getting dietary changes.  With her trip back from [facility], she was improved with lower elevation.    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  Reports no vision change.    Elevated blood sugar  Patient reports no recent surveillance and no other neurologic symptoms with the exception of the palpitation and last day of headaches with the excessive ambient temperature change.    Gastroesophageal reflux disease without esophagitis  Patient has not been taking slippery elm, has continued to use Zantac.  Denies any food is stuck in throat feeling but does have bloating sensation.    Heart palpitations  Reports on and off palpitation that can occur without any recent flare.  Cardiology follow-up showed continue to have PACs.  Negative cardiac workup from last year to this year.     Current medicines (including changes today)  Current Outpatient Prescriptions   Medication Sig Dispense Refill   • dorzolamide (TRUSOPT) 2 % Solution Place 1 Drop in both eyes 3 times a day.     • Calcium-Magnesium-Vitamin D (CALCIUM MAGNESIUM PO) Take  by mouth every day.     • ibuprofen (MOTRIN) 200 MG Tab Take 200 mg by mouth every 6 hours as needed.     • raNITidine (ZANTAC) 150 MG Tab Take 150 mg by mouth 2 times a day.     • VITAMINS B1 B6 B12 PO Take  by mouth.     • D-MANNOSE PO Take 500 mg by mouth 2 Times a Day.     • aspirin (ASA) 81 MG Chew Tab chewable tablet Take 81 mg by mouth every  "day.     • latanoprost (XALATAN) 0.005 % SOLN Place 1 Drop in both eyes every evening. Lt eye daily      • Cholecalciferol (VITAMIN D-3 SUPER STRENGTH) 2000 UNIT TABS Take  by mouth.     • MISC NATURAL PRODUCTS PO Take 400 mg by mouth 2 Times a Day.     • Non Formulary Request Strontium, MWF     • Non Formulary Request alfaecal plus for bone strength     • Acetaminophen (TYLENOL PO) Take  by mouth.       No current facility-administered medications for this visit.      She  has a past medical history of Anesthesia; Arthritis; CATARACT; GERD (gastroesophageal reflux disease); Glaucoma; Hyperlipidemia; and Urinary bladder disorder.    ROS   No chest pain, no shortness of breath, no abdominal pain, no diarrhea/constipation, no urinary symptoms.     Objective:     Blood pressure 122/80, pulse 70, temperature 36.6 °C (97.9 °F), temperature source Temporal, resp. rate 14, height 1.499 m (4' 11\"), weight 55 kg (121 lb 4.1 oz), SpO2 99 %, not currently breastfeeding. Body mass index is 24.49 kg/m².   Physical Exam:  Alert, oriented in no acute distress.  Eye contact is good, speech goal directed, affect calm  HEENT: conjunctiva non-injected, sclera non-icteric.  Pinna normal. TM pearly gray. Oral mucous membranes pink and moist with no lesions.  Neck: No adenopathy or masses in the neck or supraclavicular regions.  Lungs: clear to auscultation bilaterally with good excursion.  CV: regular rate and rhythm.  Abdomen: soft, nontender, No CVAT  Ext: no edema, color normal, vascularity normal, temperature normal    Assessment and Plan:   The following treatment plan was discussed  1. Heart palpitations  CBC WITH DIFFERENTIAL    Comp Metabolic Panel    TSH    FREE THYROXINE    LipoFit by NMR    ESTRADIOL    DHEA SULFATE    PROGESTERONE    TESTOSTERONE, FREE, FEMALES/CHILDREN    HEMOGLOBIN A1C    Patient has some improvement with the lemon balm but has only started it in the last week.  Discussed with patient to stop zinc 25 mg at " this point and replace it with Maced[place] chamomile 220 mg 1-2 times a day.   2. Dyslipidemia  CBC WITH DIFFERENTIAL    Comp Metabolic Panel    TSH    FREE THYROXINE    LipoFit by NMR    ESTRADIOL    DHEA SULFATE    PROGESTERONE    TESTOSTERONE, FREE, FEMALES/CHILDREN    HEMOGLOBIN A1C    Cotninue with her effort in plant-based whole food diet, will recheck with the lab result.   3. Gastroesophageal reflux disease without esophagitis  CBC WITH DIFFERENTIAL    Comp Metabolic Panel    TSH    FREE THYROXINE    LipoFit by NMR    ESTRADIOL    DHEA SULFATE    PROGESTERONE    TESTOSTERONE, FREE, FEMALES/CHILDREN    Use resume slippery elm 400 mg twice daily while use zantac 300 mg at night.  Given her gastirc bloating the chammomile 220 mg twice a day as needed is also possible.   4. Elevated blood sugar  CBC WITH DIFFERENTIAL    Comp Metabolic Panel    TSH    FREE THYROXINE    LipoFit by NMR    ESTRADIOL    DHEA SULFATE    PROGESTERONE    TESTOSTERONE, FREE, FEMALES/CHILDREN    HEMOGLOBIN A1C    Will recheck the level while also improve her lifestyle choices.   5. Chronic midline low back pain without sciatica  CBC WITH DIFFERENTIAL    Comp Metabolic Panel    TSH    FREE THYROXINE    LipoFit by NMR    ESTRADIOL    DHEA SULFATE    PROGESTERONE    TESTOSTERONE, FREE, FEMALES/CHILDREN    HEMOGLOBIN A1C    Improvede clinically with acupuncture.  Will monitor patient's lab result and discuss during follow up.   6. 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     Followup: Return in about 4 weeks (around 4/24/2019).    Spent 25 minutes in face-to-tace patient contact in which greater than 50% of the visit was spent in counseling and coordination of care including GERD management  options, Answering patient questions about gastric bloating, Concerns and potential risks related to chronic anxiety, Discussing in depth the importance of primary prevention of CVA, Discussing the nature of anxiety and Patient is also educated about lifestyle modifications which may improve her anxiety, GERD, paliptation, neck pain  We also reviewed PMH, family history, and each of her chronic diagnoses in regards to current management, specialty physicians, symptom control, and future planning. Please refer to assessment and plan/discussion/recommendations for additional details.        Please note that dictation has been dictated using voice recognition soft ware. I have made every reasonable attempt to correct obvious errors, but I expect that there are errors of grammar and possibly content that I did not discover before finalizing the note.

## 2019-03-28 NOTE — ASSESSMENT & PLAN NOTE
Reports on and off palpitation that can occur without any recent flare.  Cardiology follow-up showed continue to have PACs.  Negative cardiac workup from last year to this year.

## 2019-03-28 NOTE — ASSESSMENT & PLAN NOTE
Patient reports no recent surveillance and no other neurologic symptoms with the exception of the palpitation and last day of headaches with the excessive ambient temperature change.

## 2019-03-29 ENCOUNTER — TELEPHONE (OUTPATIENT)
Dept: OTHER | Facility: IMAGING CENTER | Age: 74
End: 2019-03-29

## 2019-03-29 LAB
EST. AVERAGE GLUCOSE BLD GHB EST-MCNC: 134 MG/DL
HBA1C MFR BLD: 6.3 % (ref 0–5.6)

## 2019-03-29 NOTE — TELEPHONE ENCOUNTER
----- Message from Mitchel Tesfaye D.O. sent at 3/29/2019  8:39 AM PDT -----  Worsened fasting blood glucose average in the last 3 months. Possible benefit from using white mulberry leaf 1000 mg twice daily

## 2019-03-31 ENCOUNTER — OFFICE VISIT (OUTPATIENT)
Dept: URGENT CARE | Facility: CLINIC | Age: 74
End: 2019-03-31
Payer: MEDICARE

## 2019-03-31 VITALS
OXYGEN SATURATION: 98 % | HEART RATE: 76 BPM | WEIGHT: 121.26 LBS | SYSTOLIC BLOOD PRESSURE: 110 MMHG | DIASTOLIC BLOOD PRESSURE: 68 MMHG | TEMPERATURE: 98.9 F | HEIGHT: 59 IN | BODY MASS INDEX: 24.44 KG/M2 | RESPIRATION RATE: 16 BRPM

## 2019-03-31 DIAGNOSIS — J06.9 UPPER RESPIRATORY TRACT INFECTION, UNSPECIFIED TYPE: ICD-10-CM

## 2019-03-31 LAB
CHOLEST SERPL-MCNC: 194 MG/DL
FASTING STATUS PATIENT QL REPORTED: NORMAL
HDL PARTICAL NO Q4363: >41 UMOL/L
HDL SIZE Q4361: 9.1 NM
HDLC SERPL-MCNC: 81 MG/DL (ref 40–59)
HLD.LARGE SERPL-SCNC: 9 UMOL/L
L VLDL PART NO Q4357: <1.5 NMOL/L
LDL SERPL QN: 20.8 NM
LDL SERPL-SCNC: 1182 NMOL/L
LDL SMALL SERPL-SCNC: 409 NMOL/L
LDLC SERPL CALC-MCNC: 98 MG/DL
PATHOLOGY STUDY: ABNORMAL
TRIGL SERPL-MCNC: 74 MG/DL (ref 30–149)
VLDL SIZE Q4362: 47.1 NM

## 2019-03-31 PROCEDURE — 99214 OFFICE O/P EST MOD 30 MIN: CPT | Performed by: PHYSICIAN ASSISTANT

## 2019-03-31 RX ORDER — BENZONATATE 200 MG/1
200 CAPSULE ORAL 3 TIMES DAILY PRN
Qty: 30 CAP | Refills: 0 | Status: SHIPPED | OUTPATIENT
Start: 2019-03-31 | End: 2019-05-02

## 2019-03-31 RX ORDER — CODEINE PHOSPHATE AND GUAIFENESIN 10; 100 MG/5ML; MG/5ML
5 SOLUTION ORAL EVERY 4 HOURS PRN
Qty: 120 ML | Refills: 0 | Status: SHIPPED | OUTPATIENT
Start: 2019-03-31 | End: 2019-04-07

## 2019-03-31 ASSESSMENT — ENCOUNTER SYMPTOMS
CHILLS: 0
FEVER: 0
SORE THROAT: 1
WHEEZING: 0
MYALGIAS: 1
COUGH: 1
SHORTNESS OF BREATH: 0
PALPITATIONS: 0
HEMOPTYSIS: 0
SPUTUM PRODUCTION: 1

## 2019-03-31 NOTE — PROGRESS NOTES
Subjective:      Radha Kerr is a 73 y.o. female who presents with Cough (x4 days, productive cough)            Cough   This is a new problem. The current episode started in the past 7 days. The problem has been unchanged. The cough is non-productive. Associated symptoms include myalgias and a sore throat. Pertinent negatives include no chest pain, chills, ear pain, fever, hemoptysis, shortness of breath or wheezing. The symptoms are aggravated by lying down. She has tried OTC cough suppressant for the symptoms. The treatment provided no relief.       Review of Systems   Constitutional: Positive for malaise/fatigue. Negative for chills and fever.   HENT: Positive for congestion and sore throat. Negative for ear pain.    Respiratory: Positive for cough and sputum production. Negative for hemoptysis, shortness of breath and wheezing.    Cardiovascular: Negative for chest pain and palpitations.   Musculoskeletal: Positive for myalgias.   All other systems reviewed and are negative.    PMH:  has a past medical history of Anesthesia; Arthritis; CATARACT; GERD (gastroesophageal reflux disease); Glaucoma; Hyperlipidemia; and Urinary bladder disorder.  MEDS:   Current Outpatient Prescriptions:   •  benzonatate (TESSALON) 200 MG capsule, Take 1 Cap by mouth 3 times a day as needed for Cough., Disp: 30 Cap, Rfl: 0  •  guaifenesin-codeine (CHERATUSSIN AC) Solution oral solution, Take 5 mL by mouth every four hours as needed for Cough for up to 7 days., Disp: 120 mL, Rfl: 0  •  dorzolamide (TRUSOPT) 2 % Solution, Place 1 Drop in both eyes 3 times a day., Disp: , Rfl:   •  MISC NATURAL PRODUCTS PO, Take 400 mg by mouth 2 Times a Day., Disp: , Rfl:   •  Calcium-Magnesium-Vitamin D (CALCIUM MAGNESIUM PO), Take  by mouth every day., Disp: , Rfl:   •  ibuprofen (MOTRIN) 200 MG Tab, Take 200 mg by mouth every 6 hours as needed., Disp: , Rfl:   •  raNITidine (ZANTAC) 150 MG Tab, Take 150 mg by mouth 2 times a day., Disp: ,  "Rfl:   •  Non Formulary Request, Strontium, MWF, Disp: , Rfl:   •  Non Formulary Request, alfaecal plus for bone strength, Disp: , Rfl:   •  VITAMINS B1 B6 B12 PO, Take  by mouth., Disp: , Rfl:   •  D-MANNOSE PO, Take 500 mg by mouth 2 Times a Day., Disp: , Rfl:   •  aspirin (ASA) 81 MG Chew Tab chewable tablet, Take 81 mg by mouth every day., Disp: , Rfl:   •  Acetaminophen (TYLENOL PO), Take  by mouth., Disp: , Rfl:   •  latanoprost (XALATAN) 0.005 % SOLN, Place 1 Drop in both eyes every evening. Lt eye daily , Disp: , Rfl:   •  Cholecalciferol (VITAMIN D-3 SUPER STRENGTH) 2000 UNIT TABS, Take  by mouth., Disp: , Rfl:   ALLERGIES:   Allergies   Allergen Reactions   • Ciprofloxacin    • Diclofenac    • Myrbetriq [Mirabegron]    • Other Drug      Anticolinergic   • Pcn [Penicillins]      SURGHX:   Past Surgical History:   Procedure Laterality Date   • COLONOSCOPY  12/17/13    2 polyps   • KNEE ARTHROSCOPY  1/9/2013    Performed by Seun Giles M.D. at SURGERY [facility]   • MEDIAL MENISCECTOMY  1/9/2013    Performed by Seun Giles M.D. at SURGERY [facility]   • KNEE ARTHROSCOPY  7/13/2010    Performed by HAL WRIGHT at SURGERY SAME DAY [facility]   • MEDIAL MENISCECTOMY  7/13/2010    Performed by HAL WRIGHT at SURGERY SAME DAY [facility]   • ABDOMINAL HYSTERECTOMY TOTAL      gall bladder     SOCHX:  reports that she has never smoked. She has never used smokeless tobacco. She reports that she does not drink alcohol or use drugs.  FH: Family history was reviewed, no pertinent findings to report  Medications, Allergies, and current problem list reviewed today in Epic         Objective:     /68   Pulse 76   Temp 37.2 °C (98.9 °F)   Resp 16   Ht 1.499 m (4' 11\")   Wt 55 kg (121 lb 4.1 oz)   SpO2 98%   BMI 24.49 kg/m²      Physical Exam   Constitutional: She is oriented to person, place, and time. She appears well-developed and well-nourished. She is active.  Non-toxic " appearance. She does not have a sickly appearance. She does not appear ill. No distress. She is not intubated.   HENT:   Head: Normocephalic and atraumatic.   Right Ear: Hearing, tympanic membrane, external ear and ear canal normal.   Left Ear: Hearing, tympanic membrane, external ear and ear canal normal.   Nose: Nose normal.   Mouth/Throat: Uvula is midline, oropharynx is clear and moist and mucous membranes are normal.   Eyes: Conjunctivae, EOM and lids are normal.   Neck: Normal range of motion and full passive range of motion without pain. Neck supple.   Cardiovascular: Normal rate, regular rhythm, S1 normal, S2 normal and normal heart sounds.  Exam reveals no gallop and no friction rub.    No murmur heard.  Pulmonary/Chest: Effort normal and breath sounds normal. No accessory muscle usage. No apnea, no tachypnea and no bradypnea. She is not intubated. No respiratory distress. She has no decreased breath sounds. She has no wheezes. She has no rhonchi. She has no rales. She exhibits no tenderness.   Musculoskeletal: Normal range of motion.   Neurological: She is alert and oriented to person, place, and time.   Skin: Skin is warm and dry.   Psychiatric: She has a normal mood and affect. Her speech is normal and behavior is normal. Judgment and thought content normal.   Vitals reviewed.              Assessment/Plan:     1. Upper respiratory tract infection, unspecified type    - benzonatate (TESSALON) 200 MG capsule; Take 1 Cap by mouth 3 times a day as needed for Cough.  Dispense: 30 Cap; Refill: 0  - guaifenesin-codeine (CHERATUSSIN AC) Solution oral solution; Take 5 mL by mouth every four hours as needed for Cough for up to 7 days.  Dispense: 120 mL; Refill: 0    Differential diagnosis, natural history, supportive care discussed. Follow-up with primary care provider within 7-10 days, emergency room precautions discussed.  Patient and/or family appears understanding of information.  Handout and review of  patients diagnosis and treatment was discussed extensively.

## 2019-03-31 NOTE — PATIENT INSTRUCTIONS
"Upper Respiratory Infection, Adult  Most upper respiratory infections (URIs) are a viral infection of the air passages leading to the lungs. A URI affects the nose, throat, and upper air passages. The most common type of URI is nasopharyngitis and is typically referred to as \"the common cold.\"  URIs run their course and usually go away on their own. Most of the time, a URI does not require medical attention, but sometimes a bacterial infection in the upper airways can follow a viral infection. This is called a secondary infection. Sinus and middle ear infections are common types of secondary upper respiratory infections.  Bacterial pneumonia can also complicate a URI. A URI can worsen asthma and chronic obstructive pulmonary disease (COPD). Sometimes, these complications can require emergency medical care and may be life threatening.  What are the causes?  Almost all URIs are caused by viruses. A virus is a type of germ and can spread from one person to another.  What increases the risk?  You may be at risk for a URI if:  · You smoke.  · You have chronic heart or lung disease.  · You have a weakened defense (immune) system.  · You are very young or very old.  · You have nasal allergies or asthma.  · You work in crowded or poorly ventilated areas.  · You work in health care facilities or schools.  What are the signs or symptoms?  Symptoms typically develop 2-3 days after you come in contact with a cold virus. Most viral URIs last 7-10 days. However, viral URIs from the influenza virus (flu virus) can last 14-18 days and are typically more severe. Symptoms may include:  · Runny or stuffy (congested) nose.  · Sneezing.  · Cough.  · Sore throat.  · Headache.  · Fatigue.  · Fever.  · Loss of appetite.  · Pain in your forehead, behind your eyes, and over your cheekbones (sinus pain).  · Muscle aches.  How is this diagnosed?  Your health care provider may diagnose a URI by:  · Physical exam.  · Tests to check that your " symptoms are not due to another condition such as:  ¨ Strep throat.  ¨ Sinusitis.  ¨ Pneumonia.  ¨ Asthma.  How is this treated?  A URI goes away on its own with time. It cannot be cured with medicines, but medicines may be prescribed or recommended to relieve symptoms. Medicines may help:  · Reduce your fever.  · Reduce your cough.  · Relieve nasal congestion.  Follow these instructions at home:  · Take medicines only as directed by your health care provider.  · Gargle warm saltwater or take cough drops to comfort your throat as directed by your health care provider.  · Use a warm mist humidifier or inhale steam from a shower to increase air moisture. This may make it easier to breathe.  · Drink enough fluid to keep your urine clear or pale yellow.  · Eat soups and other clear broths and maintain good nutrition.  · Rest as needed.  · Return to work when your temperature has returned to normal or as your health care provider advises. You may need to stay home longer to avoid infecting others. You can also use a face mask and careful hand washing to prevent spread of the virus.  · Increase the usage of your inhaler if you have asthma.  · Do not use any tobacco products, including cigarettes, chewing tobacco, or electronic cigarettes. If you need help quitting, ask your health care provider.  How is this prevented?  The best way to protect yourself from getting a cold is to practice good hygiene.  · Avoid oral or hand contact with people with cold symptoms.  · Wash your hands often if contact occurs.  There is no clear evidence that vitamin C, vitamin E, echinacea, or exercise reduces the chance of developing a cold. However, it is always recommended to get plenty of rest, exercise, and practice good nutrition.  Contact a health care provider if:  · You are getting worse rather than better.  · Your symptoms are not controlled by medicine.  · You have chills.  · You have worsening shortness of breath.  · You have brown  or red mucus.  · You have yellow or brown nasal discharge.  · You have pain in your face, especially when you bend forward.  · You have a fever.  · You have swollen neck glands.  · You have pain while swallowing.  · You have white areas in the back of your throat.  Get help right away if:  · You have severe or persistent:  ¨ Headache.  ¨ Ear pain.  ¨ Sinus pain.  ¨ Chest pain.  · You have chronic lung disease and any of the following:  ¨ Wheezing.  ¨ Prolonged cough.  ¨ Coughing up blood.  ¨ A change in your usual mucus.  · You have a stiff neck.  · You have changes in your:  ¨ Vision.  ¨ Hearing.  ¨ Thinking.  ¨ Mood.  This information is not intended to replace advice given to you by your health care provider. Make sure you discuss any questions you have with your health care provider.  Document Released: 06/13/2002 Document Revised: 08/20/2017 Document Reviewed: 03/25/2015  [employer] Interactive Patient Education © 2017 [employer]

## 2019-04-03 ENCOUNTER — TELEPHONE (OUTPATIENT)
Dept: OTHER | Facility: IMAGING CENTER | Age: 74
End: 2019-04-03

## 2019-04-03 NOTE — TELEPHONE ENCOUNTER
----- Message from Mitchel Tesfaye D.O. sent at 4/1/2019 12:14 PM PDT -----  Increased HDL to the level of an independent risk factor, in combination with LDL particle and VLDL size.  Patient would likely benefit from [employer] if she finds inflammatory dietary pattern that is hard to put into practice.

## 2019-04-04 ENCOUNTER — HOSPITAL ENCOUNTER (OUTPATIENT)
Dept: LAB | Facility: MEDICAL CENTER | Age: 74
End: 2019-04-04
Attending: FAMILY MEDICINE
Payer: MEDICARE

## 2019-04-04 DIAGNOSIS — R00.2 HEART PALPITATIONS: ICD-10-CM

## 2019-04-04 DIAGNOSIS — K21.9 GASTROESOPHAGEAL REFLUX DISEASE WITHOUT ESOPHAGITIS: ICD-10-CM

## 2019-04-04 DIAGNOSIS — E78.5 DYSLIPIDEMIA: ICD-10-CM

## 2019-04-04 DIAGNOSIS — M54.50 CHRONIC MIDLINE LOW BACK PAIN WITHOUT SCIATICA: ICD-10-CM

## 2019-04-04 DIAGNOSIS — G89.29 CHRONIC MIDLINE LOW BACK PAIN WITHOUT SCIATICA: ICD-10-CM

## 2019-04-04 DIAGNOSIS — H40.2231 CHRONIC PRIMARY ANGLE-CLOSURE GLAUCOMA OF BOTH EYES, MILD STAGE: ICD-10-CM

## 2019-04-04 DIAGNOSIS — R73.9 ELEVATED BLOOD SUGAR: ICD-10-CM

## 2019-04-04 LAB
DHEA-S SERPL-MCNC: 24.4 UG/DL (ref 9.4–246)
PROGEST SERPL-MCNC: 0.14 NG/ML

## 2019-04-04 PROCEDURE — 84402 ASSAY OF FREE TESTOSTERONE: CPT

## 2019-04-04 PROCEDURE — 82627 DEHYDROEPIANDROSTERONE: CPT

## 2019-04-04 PROCEDURE — 84144 ASSAY OF PROGESTERONE: CPT

## 2019-04-04 PROCEDURE — 36415 COLL VENOUS BLD VENIPUNCTURE: CPT

## 2019-04-08 LAB — TESTOST FREE SERPL-MCNC: 0.9 PG/ML (ref 0.6–3.8)

## 2019-04-10 ENCOUNTER — APPOINTMENT (RX ONLY)
Dept: URBAN - METROPOLITAN AREA CLINIC 20 | Facility: CLINIC | Age: 74
Setting detail: DERMATOLOGY
End: 2019-04-10

## 2019-04-10 DIAGNOSIS — L82.1 OTHER SEBORRHEIC KERATOSIS: ICD-10-CM

## 2019-04-10 DIAGNOSIS — Z71.89 OTHER SPECIFIED COUNSELING: ICD-10-CM

## 2019-04-10 DIAGNOSIS — L82.0 INFLAMED SEBORRHEIC KERATOSIS: ICD-10-CM

## 2019-04-10 DIAGNOSIS — D22 MELANOCYTIC NEVI: ICD-10-CM

## 2019-04-10 DIAGNOSIS — L85.3 XEROSIS CUTIS: ICD-10-CM

## 2019-04-10 DIAGNOSIS — L81.4 OTHER MELANIN HYPERPIGMENTATION: ICD-10-CM

## 2019-04-10 PROBLEM — D22.5 MELANOCYTIC NEVI OF TRUNK: Status: ACTIVE | Noted: 2019-04-10

## 2019-04-10 PROBLEM — D22.71 MELANOCYTIC NEVI OF RIGHT LOWER LIMB, INCLUDING HIP: Status: ACTIVE | Noted: 2019-04-10

## 2019-04-10 PROBLEM — D48.5 NEOPLASM OF UNCERTAIN BEHAVIOR OF SKIN: Status: ACTIVE | Noted: 2019-04-10

## 2019-04-10 PROCEDURE — ? LIQUID NITROGEN

## 2019-04-10 PROCEDURE — 99203 OFFICE O/P NEW LOW 30 MIN: CPT | Mod: 25

## 2019-04-10 PROCEDURE — ? COUNSELING

## 2019-04-10 PROCEDURE — ? BIOPSY BY SHAVE METHOD

## 2019-04-10 PROCEDURE — 17110 DESTRUCTION B9 LES UP TO 14: CPT

## 2019-04-10 PROCEDURE — 11102 TANGNTL BX SKIN SINGLE LES: CPT | Mod: 59

## 2019-04-10 ASSESSMENT — LOCATION ZONE DERM
LOCATION ZONE: TRUNK
LOCATION ZONE: NECK
LOCATION ZONE: ARM
LOCATION ZONE: FEET
LOCATION ZONE: FACE
LOCATION ZONE: NOSE
LOCATION ZONE: LEG

## 2019-04-10 ASSESSMENT — LOCATION SIMPLE DESCRIPTION DERM
LOCATION SIMPLE: LEFT CHEEK
LOCATION SIMPLE: LEFT FOREARM
LOCATION SIMPLE: RIGHT ANTERIOR NECK
LOCATION SIMPLE: RIGHT FOREARM
LOCATION SIMPLE: LEFT LOWER BACK
LOCATION SIMPLE: NOSE
LOCATION SIMPLE: ABDOMEN
LOCATION SIMPLE: RIGHT THIGH
LOCATION SIMPLE: RIGHT FOOT
LOCATION SIMPLE: CHEST
LOCATION SIMPLE: UPPER BACK
LOCATION SIMPLE: INFERIOR FOREHEAD

## 2019-04-10 ASSESSMENT — LOCATION DETAILED DESCRIPTION DERM
LOCATION DETAILED: LEFT VENTRAL PROXIMAL FOREARM
LOCATION DETAILED: SUPERIOR THORACIC SPINE
LOCATION DETAILED: NASAL ROOT
LOCATION DETAILED: LOWER STERNUM
LOCATION DETAILED: RIGHT PROXIMAL DORSAL FOREARM
LOCATION DETAILED: RIGHT DORSAL FOOT
LOCATION DETAILED: INFERIOR THORACIC SPINE
LOCATION DETAILED: INFERIOR MID FOREHEAD
LOCATION DETAILED: RIGHT VENTRAL PROXIMAL FOREARM
LOCATION DETAILED: LEFT SUPERIOR PREAURICULAR CHEEK
LOCATION DETAILED: RIGHT ANTERIOR PROXIMAL THIGH
LOCATION DETAILED: LEFT SUPERIOR MEDIAL LOWER BACK
LOCATION DETAILED: SUBXIPHOID
LOCATION DETAILED: RIGHT INFERIOR ANTERIOR NECK

## 2019-04-10 NOTE — PROCEDURE: BIOPSY BY SHAVE METHOD
Curettage Text: The wound bed was treated with curettage after the biopsy was performed.
Bill For Surgical Tray: no
Lab: [id number]
Consent: Written consent was obtained and risks were reviewed including but not limited to scarring, infection, bleeding, scabbing, incomplete removal, nerve damage and allergy to anesthesia.
Notification Instructions: Patient will be notified of biopsy results. However, patient instructed to call the office if not contacted within 2 weeks.
Detail Level: Detailed
Biopsy Method: Personna blade
Wound Care: Petrolatum
Size Of Lesion In Cm: 0.6
Render Post-Care Instructions In Note?: yes
Depth Of Biopsy: dermis
Anesthesia Type: 1% lidocaine with 1:100,000 epinephrine and a 1:10 solution of 8.4% sodium bicarbonate
Additional Anesthesia Volume In Cc (Will Not Render If 0): 0
Cryotherapy Text: The wound bed was treated with cryotherapy after the biopsy was performed.
Lab Facility: 
Biopsy Type: H and E
Anesthesia Volume In Cc: 2
Electrodesiccation Text: The wound bed was treated with electrodesiccation after the biopsy was performed.
Type Of Destruction Used: Curettage
Billing Type: Third-Party Bill
Hemostasis: Drysol
Silver Nitrate Text: The wound bed was treated with silver nitrate after the biopsy was performed.
Electrodesiccation And Curettage Text: The wound bed was treated with electrodesiccation and curettage after the biopsy was performed.
Dressing: pressure dressing with telfa
Post-Care Instructions: I reviewed with the patient in detail post-care instructions. Patient is to keep the biopsy site dry overnight. Gentle cleansing daily.  Apply petroleum ointment daily until healed. Patient may apply hydrogen peroxide soaks to remove any crusting.

## 2019-04-10 NOTE — PROCEDURE: LIQUID NITROGEN
Post-Care Instructions: I reviewed with the patient in detail post-care instructions. Patient is to wear sunprotection, and avoid picking at any of the treated lesions. Pt may apply Vaseline to crusted or scabbing areas.
Detail Level: Detailed
Consent: The patient's consent was obtained including but not limited to risks of crusting, scabbing, blistering, scarring, darker or lighter pigmentary change, recurrence, incomplete removal and infection.
Include Z78.9 (Other Specified Conditions Influencing Health Status) As An Associated Diagnosis?: No
Medical Necessity Information: It is in your best interest to select a reason for this procedure from the list below. All of these items fulfill various CMS LCD requirements except the new and changing color options.
Render Post-Care Instructions In Note?: yes
Medical Necessity Clause: This procedure was medically necessary because the lesions that were treated were:

## 2019-05-02 ENCOUNTER — HOSPITAL ENCOUNTER (OUTPATIENT)
Dept: RADIOLOGY | Facility: MEDICAL CENTER | Age: 74
End: 2019-05-02
Attending: FAMILY MEDICINE
Payer: MEDICARE

## 2019-05-02 ENCOUNTER — OFFICE VISIT (OUTPATIENT)
Dept: MEDICAL GROUP | Facility: IMAGING CENTER | Age: 74
End: 2019-05-02
Payer: MEDICARE

## 2019-05-02 VITALS
WEIGHT: 116.4 LBS | HEIGHT: 59 IN | DIASTOLIC BLOOD PRESSURE: 80 MMHG | HEART RATE: 68 BPM | TEMPERATURE: 98.6 F | OXYGEN SATURATION: 98 % | RESPIRATION RATE: 14 BRPM | SYSTOLIC BLOOD PRESSURE: 124 MMHG | BODY MASS INDEX: 23.47 KG/M2

## 2019-05-02 DIAGNOSIS — M54.50 ACUTE MIDLINE LOW BACK PAIN WITHOUT SCIATICA: ICD-10-CM

## 2019-05-02 DIAGNOSIS — R00.2 HEART PALPITATIONS: ICD-10-CM

## 2019-05-02 DIAGNOSIS — R73.01 IMPAIRED FASTING GLUCOSE: ICD-10-CM

## 2019-05-02 DIAGNOSIS — E78.5 DYSLIPIDEMIA: ICD-10-CM

## 2019-05-02 LAB — GLUCOSE BLD-MCNC: 89 MG/DL (ref 70–100)

## 2019-05-02 PROCEDURE — 98925 OSTEOPATH MANJ 1-2 REGIONS: CPT | Performed by: FAMILY MEDICINE

## 2019-05-02 PROCEDURE — 99214 OFFICE O/P EST MOD 30 MIN: CPT | Mod: 25 | Performed by: FAMILY MEDICINE

## 2019-05-02 PROCEDURE — 72100 X-RAY EXAM L-S SPINE 2/3 VWS: CPT

## 2019-05-02 NOTE — ASSESSMENT & PLAN NOTE
Patient has been trying to cut back her surgery dessert and drink intake.  Worried if thi would also go up along with her HbA1c numbers.

## 2019-05-02 NOTE — ASSESSMENT & PLAN NOTE
Discussed with patient of her lab result and the lifestyle changes that she can make.  With the recent resolution of her palpitation and headache that she is hopeful her random blood glucose would be under 200.  Patient also tolerate the white mulberry and is currently taking 1000 mg 2 capsules daily.

## 2019-05-02 NOTE — ASSESSMENT & PLAN NOTE
Patient has reported 1 week hx of midline low back pain without trauma and no radiation of her pain.  The level is rated 3/10 with exacerbating factor of lumbar extension.

## 2019-05-02 NOTE — PROGRESS NOTES
Chief Complaint   Patient presents with   • Low Back Pain     x 1 week    • Results     would like to go over blood work      Subjective:   Radha Kerr is a 73 y.o. female here today for multiple problems as listed below.      Impaired fasting glucose  Discussed with patient of her lab result and the lifestyle changes that she can make.  With the recent resolution of her palpitation and headache that she is hopeful her random blood glucose would be under 200.  Patient also tolerate the white mulberry and is currently taking 1000 mg 2 capsules daily.    Heart palpitations  Discussed with patient of her hormonal lab result and it's implications.  Patient verbalized understanding and discussed.    Dyslipidemia  Patient has been trying to cut back her surgery dessert and drink intake.  Worried if thi would also go up along with her HbA1c numbers.    Acute midline low back pain without sciatica  Patient has reported 1 week hx of midline low back pain without trauma and no radiation of her pain.  The level is rated 3/10 with exacerbating factor of lumbar extension.     Current medicines (including changes today)  Current Outpatient Prescriptions   Medication Sig Dispense Refill   • dorzolamide (TRUSOPT) 2 % Solution Place 1 Drop in both eyes 3 times a day.     • Calcium-Magnesium-Vitamin D (CALCIUM MAGNESIUM PO) Take  by mouth every day.     • ibuprofen (MOTRIN) 200 MG Tab Take 200 mg by mouth every 6 hours as needed.     • raNITidine (ZANTAC) 150 MG Tab Take 150 mg by mouth 2 times a day.     • VITAMINS B1 B6 B12 PO Take  by mouth.     • D-MANNOSE PO Take 500 mg by mouth 2 Times a Day.     • aspirin (ASA) 81 MG Chew Tab chewable tablet Take 81 mg by mouth every day.     • latanoprost (XALATAN) 0.005 % SOLN Place 1 Drop in both eyes every evening. Lt eye daily      • Cholecalciferol (VITAMIN D-3 SUPER STRENGTH) 2000 UNIT TABS Take  by mouth.     • MISC NATURAL PRODUCTS PO Take 400 mg by mouth 2 Times a Day.    "  • Non Formulary Request Strontium, MWF     • Non Formulary Request alfaecal plus for bone strength     • Acetaminophen (TYLENOL PO) Take  by mouth.       No current facility-administered medications for this visit.      She  has a past medical history of Anesthesia; Arthritis; CATARACT; GERD (gastroesophageal reflux disease); Glaucoma; Hyperlipidemia; and Urinary bladder disorder.    ROS  No chest pain, no shortness of breath, no abdominal pain, no diarrhea/constipation, no urinary symptoms.     Objective:     /80 (BP Location: Left arm, Patient Position: Sitting, BP Cuff Size: Adult)   Pulse 68   Temp 37 °C (98.6 °F) (Temporal)   Resp 14   Ht 1.499 m (4' 11\")   Wt 52.8 kg (116 lb 6.4 oz)   SpO2 98%  Body mass index is 23.51 kg/m².  Physical Exam:  Alert, oriented in no acute distress.  Eye contact is good, speech goal directed, affect calm  HEENT: conjunctiva non-injected, sclera non-icteric.  Pinna normal. TM pearly gray. Oral mucous membranes pink and moist with no lesions.  Neck: No adenopathy or masses in the neck or supraclavicular regions.  Lungs: clear to auscultation bilaterally with good excursion.  CV: regular rate and rhythm.  Abdomen: soft, nontender, No CVAT  OMM: L3-L4 FSLRR, right innominate anterior nutation  Ext: no edema, color normal, vascularity normal, temperature normal    Assessment and Plan:   The following treatment plan was discussed  1. Impaired fasting glucose  POC GLUCOSE    Patient has been tolerating the fasting glucose and the random glucose today is 89.  Discussed with patient of the possible benefit from having lifestyle changes.  Continue with white mulberry 1000 mg daily until late October 2019 to see if we can have the Hb A1c improvement in August and continue the trajectory.   2. Dyslipidemia      Discussed with patient of the importance in lifestyle changes as she is weary of the starting of statin agent.  Discussed with patient of the lab results and hormonal " level in detail for over 10 minutes.   3. Heart palpitations      Resolving.  Discussed of the improtance that she would benefit from physical activity and with her knees arthritis that water aerobic or eliptical/ recombent bicycle use with lowered knee impact.   4. Acute midline low back pain without sciatica  DX-LUMBAR SPINE-4+ VIEWS    Patient has likely benefit from additional acupuncture in this week to continue with her acupuncture therapy today.  Discussed of the OMT risk and benefit with OMT performed at lumbar and pelvis region with muscle energy and articulatory technique. Improvement palpated and reported by patient.       Followup: Return in about 3 months (around 8/2/2019).    Spent 39.9 minutes in face-to-tace patient contact in which greater than 50% of the visit was spent in counseling and coordination of care including Impaired fasting glucose management options, Answering patient questions about dyslipdemia, Concerns and potential risks related to resolving palpitation and declination of statin, Discussing in depth the importance of primary prevention of lumbar fracture, Discussing the nature of dyslipdemia and Patient is also educated about lifestyle modifications which may improve her impaired fasting glucose.  We also reviewed PMH, family history, and each of her chronic diagnoses in regards to current management, specialty physicians, symptom control, and future planning. Please refer to assessment and plan/discussion/recommendations for additional details.        Please note that dictation has been dictated using voice recognition soft ware. I have made every reasonable attempt to correct obvious errors, but I expect that there are errors of grammar and possibly content that I did not discover before finalizing the note.

## 2019-05-02 NOTE — ASSESSMENT & PLAN NOTE
Discussed with patient of her hormonal lab result and it's implications.  Patient verbalized understanding and discussed.

## 2019-05-02 NOTE — PATIENT INSTRUCTIONS
Continue with white mulberry 1000 mg daily and consider another acupuncture therapy within this week.

## 2019-05-03 ENCOUNTER — TELEPHONE (OUTPATIENT)
Dept: MEDICAL GROUP | Facility: IMAGING CENTER | Age: 74
End: 2019-05-03

## 2019-05-03 DIAGNOSIS — G89.29 CHRONIC MIDLINE LOW BACK PAIN WITHOUT SCIATICA: ICD-10-CM

## 2019-05-03 DIAGNOSIS — M54.50 CHRONIC MIDLINE LOW BACK PAIN WITHOUT SCIATICA: ICD-10-CM

## 2019-05-03 NOTE — TELEPHONE ENCOUNTER
----- Message from Mitchel Tesfaye D.O. sent at 5/2/2019  3:12 PM PDT -----  Negative for fracture, osteopenic with anterolithiasis at L4-5

## 2019-05-03 NOTE — TELEPHONE ENCOUNTER
Notified pt of results. Pt inquired about next steps. Advised it would be a good idea to continue acupuncture therapy. Also asked pt if she would like to try some physical therapy. She is agreeable, but would like to start after her eye surgery at the end of may. Open to any other suggestions from Dr. Tesfaye.

## 2019-05-12 ENCOUNTER — OFFICE VISIT (OUTPATIENT)
Dept: URGENT CARE | Facility: CLINIC | Age: 74
End: 2019-05-12
Payer: MEDICARE

## 2019-05-12 ENCOUNTER — HOSPITAL ENCOUNTER (OUTPATIENT)
Facility: MEDICAL CENTER | Age: 74
End: 2019-05-12
Attending: PHYSICIAN ASSISTANT
Payer: MEDICARE

## 2019-05-12 VITALS
RESPIRATION RATE: 16 BRPM | OXYGEN SATURATION: 99 % | BODY MASS INDEX: 23.39 KG/M2 | SYSTOLIC BLOOD PRESSURE: 130 MMHG | HEIGHT: 59 IN | WEIGHT: 116 LBS | DIASTOLIC BLOOD PRESSURE: 90 MMHG | HEART RATE: 83 BPM | TEMPERATURE: 98.1 F

## 2019-05-12 DIAGNOSIS — R30.0 DYSURIA: ICD-10-CM

## 2019-05-12 DIAGNOSIS — N30.00 ACUTE CYSTITIS WITHOUT HEMATURIA: ICD-10-CM

## 2019-05-12 LAB
APPEARANCE UR: CLEAR
BILIRUB UR STRIP-MCNC: NEGATIVE MG/DL
COLOR UR AUTO: YELLOW
GLUCOSE UR STRIP.AUTO-MCNC: NEGATIVE MG/DL
KETONES UR STRIP.AUTO-MCNC: NEGATIVE MG/DL
LEUKOCYTE ESTERASE UR QL STRIP.AUTO: NORMAL
NITRITE UR QL STRIP.AUTO: NEGATIVE
PH UR STRIP.AUTO: 7 [PH] (ref 5–8)
PROT UR QL STRIP: NEGATIVE MG/DL
RBC UR QL AUTO: NORMAL
SP GR UR STRIP.AUTO: 1.01
UROBILINOGEN UR STRIP-MCNC: 0.2 MG/DL

## 2019-05-12 PROCEDURE — 81002 URINALYSIS NONAUTO W/O SCOPE: CPT | Performed by: PHYSICIAN ASSISTANT

## 2019-05-12 PROCEDURE — 87077 CULTURE AEROBIC IDENTIFY: CPT

## 2019-05-12 PROCEDURE — 87086 URINE CULTURE/COLONY COUNT: CPT

## 2019-05-12 PROCEDURE — 99214 OFFICE O/P EST MOD 30 MIN: CPT | Performed by: PHYSICIAN ASSISTANT

## 2019-05-12 PROCEDURE — 87186 SC STD MICRODIL/AGAR DIL: CPT

## 2019-05-12 RX ORDER — CEFDINIR 300 MG/1
300 CAPSULE ORAL 2 TIMES DAILY
Qty: 10 CAP | Refills: 0 | Status: SHIPPED | OUTPATIENT
Start: 2019-05-12 | End: 2019-05-12 | Stop reason: SDUPTHER

## 2019-05-12 RX ORDER — CEFDINIR 300 MG/1
300 CAPSULE ORAL 2 TIMES DAILY
Qty: 10 CAP | Refills: 0 | Status: SHIPPED | OUTPATIENT
Start: 2019-05-12 | End: 2019-05-17

## 2019-05-12 ASSESSMENT — ENCOUNTER SYMPTOMS
DIARRHEA: 0
ABDOMINAL PAIN: 0
RESPIRATORY NEGATIVE: 1
CHILLS: 0
NEUROLOGICAL NEGATIVE: 1
FEVER: 0
CARDIOVASCULAR NEGATIVE: 1
FLANK PAIN: 0
VOMITING: 0
NAUSEA: 0

## 2019-05-13 DIAGNOSIS — N30.00 ACUTE CYSTITIS WITHOUT HEMATURIA: ICD-10-CM

## 2019-05-13 NOTE — PROGRESS NOTES
Subjective:      Radha Kerr is a 73 y.o. female who presents with Cystitis (x3 days,pressure lower abdomen )            Dysuria    This is a new problem. The current episode started in the past 7 days. The problem occurs every urination. The problem has been gradually worsening. The quality of the pain is described as burning. The patient is experiencing no pain. There has been no fever. There is no history of pyelonephritis. Associated symptoms include frequency and urgency. Pertinent negatives include no chills, flank pain, hematuria, hesitancy, nausea or vomiting. She has tried nothing for the symptoms. The treatment provided no relief. There is no history of kidney stones or recurrent UTIs.       PMH:  has a past medical history of Anesthesia; Arthritis; CATARACT; GERD (gastroesophageal reflux disease); Glaucoma; Hyperlipidemia; and Urinary bladder disorder.  MEDS:   Current Outpatient Prescriptions:   •  dorzolamide (TRUSOPT) 2 % Solution, Place 1 Drop in both eyes 3 times a day., Disp: , Rfl:   •  MISC NATURAL PRODUCTS PO, Take 400 mg by mouth 2 Times a Day., Disp: , Rfl:   •  Calcium-Magnesium-Vitamin D (CALCIUM MAGNESIUM PO), Take  by mouth every day., Disp: , Rfl:   •  ibuprofen (MOTRIN) 200 MG Tab, Take 200 mg by mouth every 6 hours as needed., Disp: , Rfl:   •  raNITidine (ZANTAC) 150 MG Tab, Take 150 mg by mouth 2 times a day., Disp: , Rfl:   •  Non Formulary Request, Strontium, MWF, Disp: , Rfl:   •  Non Formulary Request, alfaecal plus for bone strength, Disp: , Rfl:   •  VITAMINS B1 B6 B12 PO, Take  by mouth., Disp: , Rfl:   •  D-MANNOSE PO, Take 500 mg by mouth 2 Times a Day., Disp: , Rfl:   •  aspirin (ASA) 81 MG Chew Tab chewable tablet, Take 81 mg by mouth every day., Disp: , Rfl:   •  Acetaminophen (TYLENOL PO), Take  by mouth., Disp: , Rfl:   •  latanoprost (XALATAN) 0.005 % SOLN, Place 1 Drop in both eyes every evening. Lt eye daily , Disp: , Rfl:   •  Cholecalciferol (VITAMIN D-3  "SUPER STRENGTH) 2000 UNIT TABS, Take  by mouth., Disp: , Rfl:   ALLERGIES:   Allergies   Allergen Reactions   • Ciprofloxacin    • Diclofenac    • Myrbetriq [Mirabegron]    • Other Drug      Anticolinergic   • Pcn [Penicillins]      SURGHX:   Past Surgical History:   Procedure Laterality Date   • COLONOSCOPY  12/17/13    2 polyps   • KNEE ARTHROSCOPY  1/9/2013    Performed by Seun Giles M.D. at SURGERY [facility] ORS   • MEDIAL MENISCECTOMY  1/9/2013    Performed by Seun Giles M.D. at SURGERY [facility] ORS   • KNEE ARTHROSCOPY  7/13/2010    Performed by HAL WRIGHT at SURGERY SAME DAY [facility] ORS   • MEDIAL MENISCECTOMY  7/13/2010    Performed by HAL WRIGHT at SURGERY SAME DAY [facility] ORS   • ABDOMINAL HYSTERECTOMY TOTAL      gall bladder     SOCHX:  reports that she has never smoked. She has never used smokeless tobacco. She reports that she does not drink alcohol or use drugs.  FH: family history includes Cancer in her father; Diabetes in her father; Heart Disease in her mother; Stroke (age of onset: 70) in her father.    Review of Systems   Constitutional: Negative for chills and fever.   Respiratory: Negative.    Cardiovascular: Negative.    Gastrointestinal: Negative for abdominal pain, diarrhea, nausea and vomiting.   Genitourinary: Positive for dysuria, frequency and urgency. Negative for flank pain, hematuria and hesitancy.   Neurological: Negative.        Medications, Allergies, and current problem list reviewed today in Epic     Objective:     /90 (BP Location: Left arm, Patient Position: Sitting)   Pulse 83   Temp 36.7 °C (98.1 °F) (Temporal)   Resp 16   Ht 1.499 m (4' 11\")   Wt 52.6 kg (116 lb)   SpO2 99%   BMI 23.43 kg/m²      Physical Exam   Constitutional: She is oriented to person, place, and time. She appears well-developed and well-nourished. No distress.   HENT:   Head: Normocephalic and atraumatic.   Right Ear: External ear normal.   Left Ear: External ear " normal.   Eyes: Conjunctivae are normal.   Neck: Normal range of motion. Neck supple.   Cardiovascular: Normal rate, regular rhythm and normal heart sounds.    Pulmonary/Chest: Effort normal and breath sounds normal. No respiratory distress. She has no wheezes. She has no rales.   Abdominal: Soft. She exhibits no distension. There is no tenderness. There is no rebound.   No flank pain or tenderness bilaterally   Neurological: She is alert and oriented to person, place, and time.   Skin: Skin is warm and dry. She is not diaphoretic.   Psychiatric: She has a normal mood and affect. Her behavior is normal. Judgment and thought content normal.   Nursing note and vitals reviewed.              Assessment/Plan:     1. Dysuria  POCT Urinalysis   2. Acute cystitis without hematuria  URINE CULTURE(NEW)    cefdinir (OMNICEF) 300 MG Cap    DISCONTINUED: cefdinir (OMNICEF) 300 MG Cap     Urinalysis: Positive leukocytes    Vitals normal, no signs of pyelonephritis.  Take full course of antibiotic  Urine culture, I will only call patient if I need to change antibiotic pending results  Significantly increase fluids  OTC Motrin or Azo as needed  Cranberry as tolerated    Return to clinic or go to ED if symptoms worsen or persist. Indications for ED discussed at length. Patient voices understanding. Follow-up with your primary care provider in 3-5 days. Red flags discussed. All side effects of medication discussed including allergic response, GI upset, tendon injury, etc.    Please note that this dictation was created using voice recognition software. I have made every reasonable attempt to correct obvious errors, but I expect that there are errors of grammar and possibly content that I did not discover before finalizing the note.

## 2019-06-25 ENCOUNTER — OFFICE VISIT (OUTPATIENT)
Dept: MEDICAL GROUP | Facility: IMAGING CENTER | Age: 74
End: 2019-06-25
Payer: MEDICARE

## 2019-06-25 ENCOUNTER — HOSPITAL ENCOUNTER (OUTPATIENT)
Dept: LAB | Facility: MEDICAL CENTER | Age: 74
End: 2019-06-25
Attending: FAMILY MEDICINE
Payer: MEDICARE

## 2019-06-25 VITALS
HEART RATE: 78 BPM | SYSTOLIC BLOOD PRESSURE: 130 MMHG | OXYGEN SATURATION: 97 % | BODY MASS INDEX: 23.67 KG/M2 | DIASTOLIC BLOOD PRESSURE: 90 MMHG | RESPIRATION RATE: 14 BRPM | TEMPERATURE: 98.5 F | HEIGHT: 59 IN | WEIGHT: 117.4 LBS

## 2019-06-25 DIAGNOSIS — G89.29 CHRONIC MIDLINE LOW BACK PAIN WITHOUT SCIATICA: ICD-10-CM

## 2019-06-25 DIAGNOSIS — R00.2 HEART PALPITATIONS: ICD-10-CM

## 2019-06-25 DIAGNOSIS — R07.89 OTHER CHEST PAIN: ICD-10-CM

## 2019-06-25 DIAGNOSIS — I15.9 SECONDARY HYPERTENSION: ICD-10-CM

## 2019-06-25 DIAGNOSIS — M54.2 CERVICALGIA: ICD-10-CM

## 2019-06-25 DIAGNOSIS — M54.50 CHRONIC MIDLINE LOW BACK PAIN WITHOUT SCIATICA: ICD-10-CM

## 2019-06-25 PROCEDURE — 99214 OFFICE O/P EST MOD 30 MIN: CPT | Performed by: FAMILY MEDICINE

## 2019-06-25 RX ORDER — PREDNISOLONE ACETATE 10 MG/ML
SUSPENSION/ DROPS OPHTHALMIC
COMMUNITY
Start: 2019-05-30 | End: 2019-08-04

## 2019-06-25 NOTE — ASSESSMENT & PLAN NOTE
With patient's improvement of GERD in the last month patient had not been paying to have this episode of one with exacerbation with headache, palpitation, substernal burning sensation, dizziness the patient has been very concerned of her fluctuating blood pressure has come back.  All these issues had made her tensed up with an increase in the lower back pain as well

## 2019-06-25 NOTE — ASSESSMENT & PLAN NOTE
Patient has been seeing fluctuation of BP.  With Home BP of 105-89/95-56 that it was not correlated to her optometrist readings as well as her prior BP readings.

## 2019-06-25 NOTE — ASSESSMENT & PLAN NOTE
Patient had been not having any issues with these except for the last week.  Patient described the same reputation issue as well as occipital headache that had precipitated since her substernal burning sensation.

## 2019-06-25 NOTE — ASSESSMENT & PLAN NOTE
Patient's neck has been having some tenderness after the nausea sensation postprandial along with substernal discomfort with burning sensation which is similar to March 2019 episode.  Patient also described palpitation intermittently.

## 2019-06-25 NOTE — PROGRESS NOTES
Chief Complaint   Patient presents with   • Blood Pressure Problem     elevated bp at eye doctor 149/101   • Palpitations     x 1 week    • Headache     x 1 week   • Dizziness     thought it was something with her eyes, x 1 week      Subjective:   Radha Kerr is a 74 y.o. female here today for multiple problems as listed below.      Heart palpitations  Patient had been not having any issues with these except for the last week.  Patient described the same reputation issue as well as occipital headache that had precipitated since her substernal burning sensation.    Chronic midline low back pain without sciatica  With patient's improvement of GERD in the last month patient had not been paying to have this episode of one with exacerbation with headache, palpitation, substernal burning sensation, dizziness the patient has been very concerned of her fluctuating blood pressure has come back.  All these issues had made her tensed up with an increase in the lower back pain as well    Cervicalgia  Patient's neck has been having some tenderness after the nausea sensation postprandial along with substernal discomfort with burning sensation which is similar to March 2019 episode.  Patient also described palpitation intermittently.    Secondary hypertension  Patient has been seeing fluctuation of BP.  With Home BP of 105-89/95-56 that it was not correlated to her optometrist readings as well as her prior BP readings.     Current medicines (including changes today)  Current Outpatient Prescriptions   Medication Sig Dispense Refill   • prednisoLONE acetate (PRED FORTE) 1 % Suspension      • Calcium-Magnesium-Vitamin D (CALCIUM MAGNESIUM PO) Take  by mouth every day.     • ibuprofen (MOTRIN) 200 MG Tab Take 200 mg by mouth every 6 hours as needed.     • raNITidine (ZANTAC) 150 MG Tab Take 150 mg by mouth 2 times a day.     • VITAMINS B1 B6 B12 PO Take  by mouth.     • D-MANNOSE PO Take 500 mg by mouth 2 Times a Day.    "  • aspirin (ASA) 81 MG Chew Tab chewable tablet Take 81 mg by mouth every day.     • Acetaminophen (TYLENOL PO) Take  by mouth.     • latanoprost (XALATAN) 0.005 % SOLN Place 1 Drop in both eyes every evening. Lt eye daily      • dorzolamide (TRUSOPT) 2 % Solution Place 1 Drop in both eyes 3 times a day.     • MISC NATURAL PRODUCTS PO Take 400 mg by mouth 2 Times a Day.     • Non Formulary Request Strontium, MWF     • Non Formulary Request alfaecal plus for bone strength     • Cholecalciferol (VITAMIN D-3 SUPER STRENGTH) 2000 UNIT TABS Take  by mouth.       No current facility-administered medications for this visit.      She  has a past medical history of Anesthesia; Arthritis; CATARACT; GERD (gastroesophageal reflux disease); Glaucoma; Hyperlipidemia; and Urinary bladder disorder.    ROS  No chest pain, no shortness of breath, no abdominal pain, no diarrhea/constipation, no urinary symptoms.     Objective:     /90 (BP Location: Left arm, Patient Position: Sitting, BP Cuff Size: Adult)   Pulse 78   Temp 36.9 °C (98.5 °F) (Temporal)   Resp 14   Ht 1.499 m (4' 11\")   Wt 53.3 kg (117 lb 6.4 oz)   SpO2 97%  Body mass index is 23.71 kg/m².  Physical Exam:  Alert, oriented in no acute distress.  Eye contact is good, speech goal directed, affect calm  HEENT: conjunctiva non-injected, sclera non-icteric.  Pinna normal. Neck: No adenopathy or masses in the neck or supraclavicular regions.  Lungs: clear to auscultation bilaterally with good excursion.  CV: regular rate and rhythm.  Abdomen: soft, nontender, No CVAT  Ext: no edema, color normal, vascularity normal, temperature normal    Assessment and Plan:   The following treatment plan was discussed     1. Heart palpitations  METANEPHRINES, URINE RANDOM OR 24 HR    URINE CATECHOLAMINES FRACTIONATED    Discussed with patient of the simiilarity of this episode when compared to the prior episode in March in that the episodic nature had difficulty for the " palpitation as well as the GERD like symptoms.  With her cardiovascular    2. Secondary hypertension  METANEPHRINES, URINE RANDOM OR 24 HR    URINE CATECHOLAMINES FRACTIONATED    Increased office reading but hypotensive readings in home monitoring throughout different timeframe.  Will consider measuring of urine 24-hour catecholamine and   3. Other chest pain  METANEPHRINES, URINE RANDOM OR 24 HR    URINE CATECHOLAMINES FRACTIONATED    Discussed with the possible benefit from using hibiscus 500 mg twice daily.   4. Cervicalgia  REFERRAL TO PHYSICAL THERAPY Reason for Therapy: Eval/Treat/Report    PT referral sent and patient expressed preferrence in location closer to her home.  Will also recommend continue with her session in chair yoga in reducing   5. Chronic midline low back pain without sciatica  REFERRAL TO PHYSICAL THERAPY Reason for Therapy: Eval/Treat/Report    Patient has been seeing some improvement with low back pain but has        Followup: Return in about 3 weeks (around 7/16/2019).  and 1 additional nurse office visit prior to the follow-up.    Spent 60 minutes in face-to-tace patient contact in which greater than 50% of the visit was spent in counseling and coordination of care including Heart palpitation management options, Answering patient questions about Hiatal hernia, Concerns and potential risks related to Cervicalgia, Discussing in depth the importance of primary prevention of CAD, Discussing the nature of midline low back pain and Patient is also educated about lifestyle modifications which may improve her back pain, palpitation,   We also reviewed PMH, family history, and each of her chronic diagnoses in regards to current management, specialty physicians, symptom control, and future planning. Please refer to assessment and plan/discussion/recommendations for additional details.        Please note that dictation has been dictated using voice recognition soft ware. I have made every  reasonable attempt to correct obvious errors, but I expect that there are errors of grammar and possibly content that I did not discover before finalizing the note.

## 2019-06-27 ENCOUNTER — HOSPITAL ENCOUNTER (OUTPATIENT)
Facility: MEDICAL CENTER | Age: 74
End: 2019-06-27
Attending: FAMILY MEDICINE
Payer: MEDICARE

## 2019-06-27 DIAGNOSIS — R07.89 OTHER CHEST PAIN: ICD-10-CM

## 2019-06-27 DIAGNOSIS — I15.9 SECONDARY HYPERTENSION: ICD-10-CM

## 2019-06-27 DIAGNOSIS — R00.2 HEART PALPITATIONS: ICD-10-CM

## 2019-06-27 PROCEDURE — 82384 ASSAY THREE CATECHOLAMINES: CPT

## 2019-06-27 PROCEDURE — 83835 ASSAY OF METANEPHRINES: CPT

## 2019-06-30 LAB
CATECHOLS UR-IMP: ABNORMAL
COLLECT DURATION TIME SPEC: 24 HRS
COLLECT DURATION TIME SPEC: 24 HRS
CREAT 24H UR-MCNC: 23 MG/DL
CREAT 24H UR-MCNC: 23 MG/DL
CREAT 24H UR-MRATE: 759 MG/D (ref 500–1400)
CREAT 24H UR-MRATE: 759 MG/D (ref 500–1400)
DOPAMINE 24H UR-MRATE: 152 UG/D (ref 56–272)
DOPAMINE UR-MCNC: 46 UG/L
DOPAMINE/CREAT UR: 200 UG/G CRT (ref 0–250)
EPINEPH 24H UR-MRATE: <3 UG/D (ref 1–5)
EPINEPH UR-MCNC: <1 UG/L
EPINEPH/CREAT UR: <4 UG/G CRT (ref 0–20)
METANEPH 24H UR-MCNC: 14 UG/L
METANEPH 24H UR-MRATE: 46 UG/D (ref 39–143)
METANEPH/CREAT 24H UR: 61 UG/G CRT (ref 0–300)
NOREPINEPH 24H UR-MRATE: 43 UG/D (ref 11–60)
NOREPINEPH UR-MCNC: 13 UG/L
NOREPINEPH/CREAT UR: 57 UG/G CRT (ref 0–45)
NORMETANEPHRINE 24H UR-MCNC: 87 UG/L
NORMETANEPHRINE 24H UR-MRATE: 287 UG/D (ref 109–393)
NORMETANEPHRINE/CREAT 24H UR: 378 UG/G CRT (ref 0–400)
SPECIMEN VOL ?TM UR: 3300 ML
SPECIMEN VOL ?TM UR: 3300 ML

## 2019-07-11 ENCOUNTER — OFFICE VISIT (OUTPATIENT)
Dept: URGENT CARE | Facility: CLINIC | Age: 74
End: 2019-07-11
Payer: MEDICARE

## 2019-07-11 VITALS
TEMPERATURE: 98 F | RESPIRATION RATE: 16 BRPM | SYSTOLIC BLOOD PRESSURE: 122 MMHG | WEIGHT: 117.2 LBS | DIASTOLIC BLOOD PRESSURE: 80 MMHG | BODY MASS INDEX: 23.63 KG/M2 | OXYGEN SATURATION: 95 % | HEART RATE: 86 BPM | HEIGHT: 59 IN

## 2019-07-11 DIAGNOSIS — J01.01 ACUTE RECURRENT MAXILLARY SINUSITIS: ICD-10-CM

## 2019-07-11 PROCEDURE — 99214 OFFICE O/P EST MOD 30 MIN: CPT | Performed by: FAMILY MEDICINE

## 2019-07-11 RX ORDER — DOXYCYCLINE HYCLATE 100 MG
100 TABLET ORAL 2 TIMES DAILY
Qty: 20 TAB | Refills: 0 | Status: SHIPPED | OUTPATIENT
Start: 2019-07-11 | End: 2019-07-21

## 2019-07-11 ASSESSMENT — ENCOUNTER SYMPTOMS
NAUSEA: 0
EYE REDNESS: 0
EYE DISCHARGE: 0
VOMITING: 0
WEIGHT LOSS: 0

## 2019-07-11 NOTE — PROGRESS NOTES
"Subjective:      Radha Kerr is a 74 y.o. female who presents with Sinus Problem (x 3 wks, nasal congestion, stuffy nose, face pain, headaches, ear fullness and pain)            3 weeks left maxillary sinus pressure and drainage. Radiates to left ear. No fever. +PMH sinusitis/no sinus surgery. OTC sudafed and flonase without relief. Sx's moderate and constant. +ST due to drainage. No cough. No other aggravating or alleviating factors.          Review of Systems   Constitutional: Positive for malaise/fatigue. Negative for weight loss.   HENT: Negative for ear discharge.    Eyes: Negative for discharge and redness.   Gastrointestinal: Negative for nausea and vomiting.   Skin: Negative for itching and rash.     .  Medications, Allergies, and current problem list reviewed today in Epic       Objective:     /80 (BP Location: Left arm, Patient Position: Sitting, BP Cuff Size: Adult)   Pulse 86   Temp 36.7 °C (98 °F) (Temporal)   Resp 16   Ht 1.499 m (4' 11.02\")   Wt 53.2 kg (117 lb 3.2 oz)   SpO2 95%   BMI 23.66 kg/m²      Physical Exam   Constitutional: She is oriented to person, place, and time. She appears well-developed and well-nourished. No distress.   HENT:   Head: Normocephalic and atraumatic.   Right Ear: External ear normal.   Left Ear: External ear normal.   Tender left maxillary sinus, +PND   Eyes: Conjunctivae are normal.   Neck: Neck supple.   Cardiovascular: Normal rate, regular rhythm and normal heart sounds.    Pulmonary/Chest: Effort normal and breath sounds normal.   Lymphadenopathy:     She has no cervical adenopathy.   Neurological: She is alert and oriented to person, place, and time.   Skin: Skin is warm and dry. No rash noted.               Assessment/Plan:     1. Acute recurrent maxillary sinusitis  doxycycline (VIBRAMYCIN) 100 MG Tab     Differential diagnosis, natural history, supportive care, and indications for immediate follow-up discussed at length.     Nasal saline, " decongestant, nasal CS

## 2019-07-16 ENCOUNTER — OFFICE VISIT (OUTPATIENT)
Dept: MEDICAL GROUP | Facility: IMAGING CENTER | Age: 74
End: 2019-07-16
Payer: MEDICARE

## 2019-07-16 VITALS
HEART RATE: 70 BPM | RESPIRATION RATE: 16 BRPM | OXYGEN SATURATION: 96 % | BODY MASS INDEX: 23.83 KG/M2 | DIASTOLIC BLOOD PRESSURE: 80 MMHG | TEMPERATURE: 98.7 F | WEIGHT: 118.2 LBS | HEIGHT: 59 IN | SYSTOLIC BLOOD PRESSURE: 132 MMHG

## 2019-07-16 DIAGNOSIS — K21.9 GASTROESOPHAGEAL REFLUX DISEASE WITHOUT ESOPHAGITIS: ICD-10-CM

## 2019-07-16 DIAGNOSIS — J32.1 CHRONIC FRONTAL SINUSITIS: ICD-10-CM

## 2019-07-16 DIAGNOSIS — R73.01 IMPAIRED FASTING GLUCOSE: ICD-10-CM

## 2019-07-16 LAB
HBA1C MFR BLD: 5.9 % (ref 0–5.6)
INT CON NEG: NEGATIVE
INT CON POS: POSITIVE

## 2019-07-16 PROCEDURE — 83036 HEMOGLOBIN GLYCOSYLATED A1C: CPT | Performed by: FAMILY MEDICINE

## 2019-07-16 PROCEDURE — 99214 OFFICE O/P EST MOD 30 MIN: CPT | Performed by: FAMILY MEDICINE

## 2019-07-16 RX ORDER — SUCRALFATE 1 G/1
1 TABLET ORAL
Qty: 60 TAB | Refills: 0 | Status: SHIPPED | OUTPATIENT
Start: 2019-07-16 | End: 2019-07-18 | Stop reason: SINTOL

## 2019-07-16 NOTE — PROGRESS NOTES
Chief Complaint   Patient presents with   • Sinus Problem     x 3 weeks. Was prescribed doxycycline and made her stomach upset    • Ear Pain     left sided      Subjective:   Radha Kerr is a 74 y.o. female here today for multiple problems as listed below.      Chronic frontal sinusitis  Patient was seen recently by urgent [facility] our last visit having issues with acute frontal maxillary sinusitis on the left.  Patient had started doxycycline 100 mg and had experienced severe abdominal discomfort along with nausea but no vomiting.  Patient is a result has stopped the doxycycline secondary to the severe reaction 1 hour post administration of doxycycline.  Patient still have to work therefore patient had chosen not to take doxycycline to not have the severe effect that can be severe enough that she cannot rest while the severe abdominal discomfort has been manifesting.  Patient recently calling her ENT doctors [facility] and was prescribed dexamethasone Dosepak started today.    Impaired fasting glucose  Patient continues to be tolerating white mulberry and still take at thousand milligrams 2 capsules daily.  Patient is wondering about her hemoglobin A1c and her sister is waiting on whether her hemoglobin A1c is improved therefore to start taking white mulberry as well.     Current medicines (including changes today)  Current Outpatient Prescriptions   Medication Sig Dispense Refill   • NON SPECIFIED      • WHITE MULBERRY SC Inject  as instructed.     • sucralfate (CARAFATE) 1 GM Tab Take 1 Tab by mouth 4 Times a Day,Before Meals and at Bedtime for 15 days. 60 Tab 0   • doxycycline (VIBRAMYCIN) 100 MG Tab Take 1 Tab by mouth 2 times a day for 10 days. 20 Tab 0   • Calcium-Magnesium-Vitamin D (CALCIUM MAGNESIUM PO) Take  by mouth every day.     • raNITidine (ZANTAC) 150 MG Tab Take 150 mg by mouth 2 times a day.     • VITAMINS B1 B6 B12 PO Take  by mouth.     • D-MANNOSE PO Take 500 mg by mouth 2 Times a Day.     • aspirin  "(ASA) 81 MG Chew Tab chewable tablet Take 81 mg by mouth every day.     • latanoprost (XALATAN) 0.005 % SOLN Place 1 Drop in both eyes every evening. Lt eye daily      • Cholecalciferol (VITAMIN D-3 SUPER STRENGTH) 2000 UNIT TABS Take  by mouth.     • prednisoLONE acetate (PRED FORTE) 1 % Suspension      • dorzolamide (TRUSOPT) 2 % Solution Place 1 Drop in both eyes 3 times a day.     • MISC NATURAL PRODUCTS PO Take 400 mg by mouth 2 Times a Day.     • ibuprofen (MOTRIN) 200 MG Tab Take 200 mg by mouth every 6 hours as needed.     • Non Formulary Request Strontium, MWF     • Non Formulary Request alfaecal plus for bone strength     • Acetaminophen (TYLENOL PO) Take  by mouth.       No current facility-administered medications for this visit.      She  has a past medical history of Anesthesia; Arthritis; CATARACT; GERD (gastroesophageal reflux disease); Glaucoma; Hyperlipidemia; and Urinary bladder disorder.    ROS  No chest pain, no shortness of breath, no abdominal pain, no diarrhea/constipation, no urinary symptoms.     Objective:     /80 (BP Location: Left arm, Patient Position: Sitting, BP Cuff Size: Adult)   Pulse 70   Temp 37.1 °C (98.7 °F) (Temporal)   Resp 16   Ht 1.499 m (4' 11\")   Wt 53.6 kg (118 lb 3.2 oz)   SpO2 96%  Body mass index is 23.87 kg/m².  Physical Exam:  Alert, oriented in no acute distress.  Eye contact is good, speech goal directed, affect calm  HEENT: conjunctiva non-injected, sclera non-icteric.  Pinna normal. TM pearly gray. Oral mucous membranes pink and moist with no lesions.  Neck: No adenopathy or masses in the neck or supraclavicular regions.  Lungs: clear to auscultation bilaterally with good excursion.  CV: regular rate and rhythm.  Abdomen: soft, nontender, No CVAT  Ext: no edema, color normal, vascularity normal, temperature normal    Assessment and Plan:   The following treatment plan was discussed    1. Gastroesophageal reflux disease without esophagitis  sucralfate " (CARAFATE) 1 GM Tab    Patient described GI discomfort post doxycycline administration.  Patient has been seeing some relief from Marshmallow root but doesn't see any effect once she started with doxycycline.   2. Impaired fasting glucose  POCT Hemoglobin A1C    Discussed with patient of the hemoglobin A1c point-of-care result to be 5.9.  Continue with    3.      Subacute frontal and maxillary sinusitis           Vinpocetine 10 mg daily for 2 weeks in addition to the finishing doxycycline 100 mg twice daily and medrol dose christiano as it was rxed by her ENT.  Discussed with patient of the catacholamines lab results.    Followup: Return in about 4 weeks (around 8/13/2019), or if not better.    Spent 25 minutes in face-to-tace patient contact in which greater than 50% of the visit was spent in counseling and coordination of care including GERD management options, Answering patient questions about sinusitis, Concerns and potential risks related to impaired fasting glucose, Discussing in depth the importance of primary prevention of DM 2, Discussing the nature of sinusitis and Patient is also educated about lifestyle modifications which may improve her GI discomfort and GERD, imparied fasting glucose and frntal sinusitis.  We also reviewed PMH, family history, and each of her chronic diagnoses in regards to current management, specialty physicians, symptom control, and future planning. Please refer to assessment and plan/discussion/recommendations for additional details.        Please note that dictation has been dictated using voice recognition soft ware. I have made every reasonable attempt to correct obvious errors, but I expect that there are errors of grammar and possibly content that I did not discover before finalizing the note.

## 2019-07-17 ENCOUNTER — PHYSICAL THERAPY (OUTPATIENT)
Dept: PHYSICAL THERAPY | Facility: REHABILITATION | Age: 74
End: 2019-07-17
Attending: FAMILY MEDICINE
Payer: MEDICARE

## 2019-07-17 ENCOUNTER — HOSPITAL ENCOUNTER (EMERGENCY)
Facility: MEDICAL CENTER | Age: 74
End: 2019-07-17
Payer: MEDICARE

## 2019-07-17 VITALS
SYSTOLIC BLOOD PRESSURE: 155 MMHG | WEIGHT: 117.28 LBS | BODY MASS INDEX: 23.69 KG/M2 | RESPIRATION RATE: 18 BRPM | HEART RATE: 99 BPM | DIASTOLIC BLOOD PRESSURE: 83 MMHG | TEMPERATURE: 98.2 F

## 2019-07-17 DIAGNOSIS — M54.50 CHRONIC MIDLINE LOW BACK PAIN WITHOUT SCIATICA: ICD-10-CM

## 2019-07-17 DIAGNOSIS — M54.2 CERVICALGIA: ICD-10-CM

## 2019-07-17 DIAGNOSIS — G89.29 CHRONIC MIDLINE LOW BACK PAIN WITHOUT SCIATICA: ICD-10-CM

## 2019-07-17 PROCEDURE — 93005 ELECTROCARDIOGRAM TRACING: CPT

## 2019-07-17 PROCEDURE — 302449 STATCHG TRIAGE ONLY (STATISTIC)

## 2019-07-17 PROCEDURE — 999999 HB NO CHARGE

## 2019-07-17 NOTE — ASSESSMENT & PLAN NOTE
Patient was seen recently by urgent [facility] our last visit having issues with acute frontal maxillary sinusitis on the left.  Patient had started doxycycline 100 mg and had experienced severe abdominal discomfort along with nausea but no vomiting.  Patient is a result has stopped the doxycycline secondary to the severe reaction 1 hour post administration of doxycycline.  Patient still have to work therefore patient had chosen not to take doxycycline to not have the severe effect that can be severe enough that she cannot rest while the severe abdominal discomfort has been manifesting.  Patient recently calling her ENT doctors Sel[facility] and was prescribed dexamethasone Dosepak started today.

## 2019-07-17 NOTE — OP THERAPY EVALUATION
"  Outpatient Physical Therapy  INITIAL EVALUATION    [facility] [place] [id number] [facility], [address]  [place] [address]  Phone:  [phone]    Date of Evaluation: 07/17/2019    Patient: Radha Kerr  YOB: 1945  MRN: [id number]     Referring Provider: [place] [address] [address] [address]   Referring Diagnosis Cervicalgia [M54.2];Chronic midline low back pain without sciatica [M54.5, G89.29]     Time Calculation  Start time: 1430  Stop time: 1500 Time Calculation (min): 30 minutes     Physical Therapy Occurrence Codes    Date of onset of impairment:  6/25/19   Date physical therapy care plan established or reviewed:  7/17/19   Date physical therapy treatment started:  7/17/19          Chief Complaint: Neck Problem and Back Problem    Visit Diagnoses     ICD-10-CM   1. Cervicalgia M54.2   2. Chronic midline low back pain without sciatica M54.5    G89.29         Subjective:   History of Present Illness:     Date of onset:  6/25/2019 (Date of referral.)    Mechanism of injury:  Pt arrived for initial evaluation today and reported feeling that her heart was pounding; she felt this was abnormal for her. She reported starting new medications this morning for a sinus infection. She took a walk around mid-day,  had lunch, and took her blood pressure and heart rate; she reports they were both elevated.    Yesterday at a medical appointment, her BP was 132/80, HR 70. Today, her vitals taken after arriving at PT and sitting for approximately 10 minutes:  2:47 pm, /109, HR 95.  2:53 pm, /101, .    Palpation of radial artery pulse at her L wrist felt irregular. Based on these findings, PT advised pt to seek medical attention at urgent care or ED. Pt said her local urgent care did not have \"the machine to check her heart,\" so PT advised her to go to ED. The patient agreed.        Past Medical History:   Diagnosis Date   • Anesthesia     nausea   • Arthritis  " "  • CATARACT     early stages   • GERD (gastroesophageal reflux disease)    • Glaucoma     left eye   • Hyperlipidemia    • Urinary bladder disorder      Past Surgical History:   Procedure Laterality Date   • COLONOSCOPY  12/17/13    2 polyps   • KNEE ARTHROSCOPY  1/9/2013    Performed by Seun Giles M.D. at SURGERY [facility] ORS   • MEDIAL MENISCECTOMY  1/9/2013    Performed by Seun Giles M.D. at SURGERY [facility] ORS   • KNEE ARTHROSCOPY  7/13/2010    Performed by HAL WRIGHT at SURGERY SAME DAY [facility] ORS   • MEDIAL MENISCECTOMY  7/13/2010    Performed by HAL WRIGHT at SURGERY SAME DAY [facility] ORS   • ABDOMINAL HYSTERECTOMY TOTAL      gall bladder     Social History   Substance Use Topics   • Smoking status: Never Smoker   • Smokeless tobacco: Never Used   • Alcohol use No     Family and Occupational History     Social History   • Marital status:      Spouse name: Man   • Number of children: 1   • Years of education: N/A     Occupational History   • 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 She also reported feeling unwell since earlier this afternoon and reported starting new medications this morning for a sinus infection. Based on this report and screening of her vitals, PT advised the patient to seek medical attention. The physical therapy initial evaluation for her referral of neck and low back pain was deferred to a subsequent session.  Barriers to therapy:  Comorbidities  Prognosis: fair    Goals:   Short Term Goals:   The following goals are based on initial encounter with the patient and subjective history. No movement testing or manual evaluation was " performed during initial evaluation due to elevated heart and blood pressure, and irregular radial pulse. These goals will be further clarified at the patient's next PT appointment.  1. Independent with HEP.  2. Able to walk for at least 20 min on even surface at self selected pace without limitation by or aggravation of back or neck pain.  3. Able to bend down to retrieve items from floor level safely and with proper mechanics, without loss of balance, and without limitation by or aggravation of back or neck pain.      Long Term Goals:    1. Independent with HEP and progressions/regressions of exercises and activities as necessary.  2. Able to stand/walk for at least 45 min on even surface at self selected pace without limitation by or aggravation of back or neck pain in order to safely perform grocery shopping and other necessary activities in the community.  3. Able to turn head to look over shoulder without limitation by or aggravation of neck or back pain in order to drive safely.    Long term goal time span:  6-8 weeks    Plan:   Therapy options:  Physical therapy treatment to continue  Planned therapy interventions:  E Stim Unattended (CPT 84163), Functional Training, Self Care (CPT 77335), Gait Training (CPT 22892), Hot or Cold Pack Therapy (CPT 86230), Manual Therapy (CPT 19559), Neuromuscular Re-education (CPT 71908), Self Care ADL Training (CPT 68916), Therapeutic Activities (CPT 86014), Therapeutic Exercise (CPT 99855) and TENS Applicaton (CPT 95418)  Frequency:  2x week  Duration in weeks:  12  Duration in visits:  6  Discussed with:  Patient      Functional Limitations and Severity Modifiers  Neck Disability Total: 5  Brandon Aidan Low Back Pain and Disability Score: 4.17     Referring provider co-signature:  I have reviewed this plan of care and my co-signature certifies the need for services.    Certification Dates:   No initial evaluation performed today due to health status; certification dates TBD  at next appointment pending further medical screening and appropriateness for physical therapy evaluation.    Physician Signature: ________________________________ Date: ______________

## 2019-07-17 NOTE — ASSESSMENT & PLAN NOTE
Patient continues to be tolerating white mulberry and still take at thousand milligrams 2 capsules daily.  Patient is wondering about her hemoglobin A1c and her sister is waiting on whether her hemoglobin A1c is improved therefore to start taking white mulberry as well.

## 2019-07-17 NOTE — ED TRIAGE NOTES
Pt comes in complaining of palpitations earlier today. Pt stating she has recently started on sucralfate, methylprednisolone, and doxycycline. Recent sinus infection. Pt stating the symptoms are coming and going.

## 2019-07-18 ENCOUNTER — TELEPHONE (OUTPATIENT)
Dept: MEDICAL GROUP | Facility: IMAGING CENTER | Age: 74
End: 2019-07-18

## 2019-07-18 DIAGNOSIS — K21.9 GERD WITHOUT ESOPHAGITIS: ICD-10-CM

## 2019-07-18 RX ORDER — SUCRALFATE ORAL 1 G/10ML
1 SUSPENSION ORAL 4 TIMES DAILY
Qty: 1200 ML | Refills: 0 | Status: SHIPPED | OUTPATIENT
Start: 2019-07-18 | End: 2019-08-17

## 2019-07-18 NOTE — TELEPHONE ENCOUNTER
Called and notified patient new prescription was sent to pharmacy. Patient wanted to let us know she was able to get in to see cardiology later this afternoon.

## 2019-07-18 NOTE — TELEPHONE ENCOUNTER
Patient stopped by office this morning during power outage. Patient states she went to the ED yesterday for heart palpations. Patient states they took EKG but she could no longer sit there and wait and feels that if EKG was abnormal they would have take her back. Patient states since starting prescriptions for sinus infection she feels off. She states she has a headache and these palpations. Unable to look at patients chart due to power outage. Patient had prescriptions with her and she believes it is the sucralfate is causing these issues. She states she would like to just stop all medications and let her sinus issue heal by itself. Talked with Dr. Tesfaye and he recommend to patient a liquid form of mediation. Let patient know and she agrees to try. Let patient know I would give her a call when power comes back on and we are able to send prescription to pharmacy.

## 2019-07-19 ENCOUNTER — TELEPHONE (OUTPATIENT)
Dept: MEDICAL GROUP | Facility: IMAGING CENTER | Age: 74
End: 2019-07-19

## 2019-07-19 NOTE — TELEPHONE ENCOUNTER
Pt calling to report carafate solution is too expensive. Inquiring if she can split the pill to see if she can tolerate the medication in smaller doses throughout the day. Ok per Dr. Tesfaye.     Pt also states that she saw cardiologist and metoprolol 25mg bid was prescribed. She is to try medication for a month and f/u with cardiology. No further needs at this time.

## 2019-07-20 RX ORDER — AZITHROMYCIN 250 MG/1
TABLET, FILM COATED ORAL
Qty: 6 TAB | Refills: 0 | Status: SHIPPED | OUTPATIENT
Start: 2019-07-20 | End: 2019-08-04

## 2019-07-24 ENCOUNTER — APPOINTMENT (OUTPATIENT)
Dept: MEDICAL GROUP | Facility: IMAGING CENTER | Age: 74
End: 2019-07-24

## 2019-07-24 ENCOUNTER — NON-PROVIDER VISIT (OUTPATIENT)
Dept: MEDICAL GROUP | Facility: IMAGING CENTER | Age: 74
End: 2019-07-24
Payer: MEDICARE

## 2019-07-24 ENCOUNTER — PHYSICAL THERAPY (OUTPATIENT)
Dept: PHYSICAL THERAPY | Facility: REHABILITATION | Age: 74
End: 2019-07-24
Attending: FAMILY MEDICINE
Payer: MEDICARE

## 2019-07-24 VITALS — SYSTOLIC BLOOD PRESSURE: 152 MMHG | DIASTOLIC BLOOD PRESSURE: 88 MMHG | HEART RATE: 66 BPM

## 2019-07-24 DIAGNOSIS — G89.29 CHRONIC MIDLINE LOW BACK PAIN WITHOUT SCIATICA: ICD-10-CM

## 2019-07-24 DIAGNOSIS — M54.50 CHRONIC MIDLINE LOW BACK PAIN WITHOUT SCIATICA: ICD-10-CM

## 2019-07-24 DIAGNOSIS — M54.2 CERVICALGIA: ICD-10-CM

## 2019-07-24 PROCEDURE — 97162 PT EVAL MOD COMPLEX 30 MIN: CPT

## 2019-07-24 PROCEDURE — 97110 THERAPEUTIC EXERCISES: CPT

## 2019-07-24 ASSESSMENT — ENCOUNTER SYMPTOMS
PAIN SCALE AT LOWEST: 0
EXACERBATED BY: TWISTING
PAIN TIMING: WHEN ACTIVE
QUALITY: ACHING
ALLEVIATING FACTORS: REST
PAIN SCALE AT HIGHEST: 3
PAIN SCALE: 0

## 2019-07-24 NOTE — OP THERAPY EVALUATION
Outpatient Physical Therapy  INITIAL EVALUATION    [facility] [place] [address] [facility], [address]  [place] [address]  Phone:  [phone]    Date of Evaluation: 2019    Patient: Radha Kerr  YOB: 1945  MRN: [id number]     Referring Provider: [place] [address] [address]   Referring Diagnosis Cervicalgia [M54.2];Low back pain [M54.5];Other chronic pain [G89.29]     Time Calculation  Start time: 1500  Stop time: 1600 Time Calculation (min): 60 minutes     Physical Therapy Occurrence Codes    Date of onset of impairment:  19   Date physical therapy care plan established or reviewed:  19   Date physical therapy treatment started:  19          Chief Complaint: Back Problem    Visit Diagnoses     ICD-10-CM   1. Cervicalgia M54.2   2. Chronic midline low back pain without sciatica M54.5    G89.29         Subjective:   History of Present Illness:     Date of onset:  2019    Mechanism of injury:  Pt returns today for initial evaluation for neck and low back pain. She had adjustments to her blood pressure medication and is feeling a little better. She feels comfortable continuing with evaluation today.    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 About 2 months ago, she noted low back pain that did not resolve as in the past. At that time, she also had pain in her upper thoracic area as well. The upper TS pain has resolved, however, the LBP remains. Symptoms have improved, but are still present.    Hx: 2018, fractured L patella; healed well; occasional mild pain.    Pain:     Current pain ratin    At best pain ratin    At worst pain rating:  3    Location:  Across low back, mid to lower LS.    Quality:  Aching    Pain timing:  When active    Relieving factors:  Rest    Aggravating factors:  Twisting  Diagnostic Tests:     X-ray:  abnormal    Treatments:     None    Patient Goals:     Patient goals for therapy:  Decreased pain, increased strength and return to sport/leisure activities    Other patient goals:  Able to bend forward, and perform daily activities and work duties without pain.      Past Medical History:   Diagnosis Date   • Anesthesia     nausea   • Arthritis    • CATARACT     early stages   • GERD (gastroesophageal reflux disease)    • Glaucoma     left eye   • Hyperlipidemia    • Urinary bladder disorder      Past Surgical History:   Procedure Laterality Date   • COLONOSCOPY  12/17/13    2 polyps   • KNEE ARTHROSCOPY  1/9/2013    Performed by Seun Giles M.D. at SURGERY [facility] ORS   • MEDIAL MENISCECTOMY  1/9/2013    Performed by Seun Giles M.D. at SURGERY [facility] ORS   • KNEE ARTHROSCOPY  7/13/2010    Performed by HAL WRIGHT at SURGERY SAME DAY [facility] ORS   • MEDIAL MENISCECTOMY  7/13/2010    Performed by HAL WRIGHT at SURGERY SAME DAY [facility] ORS   • ABDOMINAL HYSTERECTOMY TOTAL      gall bladder     Social History   Substance Use Topics   • Smoking status: Never Smoker   • Smokeless tobacco: Never Used   • Alcohol use No     Family and Occupational History     Social History   • Marital status:      Spouse name: Man   • Number of children: 1   • Years of education: N/A     Occupational History   • [employer]       Objective     Observations     Additional Observation Details  Vitals taken at 3:12pm:  BP  162/77  HR  86  O2  98%    Postural Observations  Seated posture: fair  Standing posture: good    Additional Postural Observation Details  Normal spinal curves and pelvic alignment.  Mild pes planus bilaterally. Mild knee varus.      Hip Screen   Hip range of motion within functional limits with the following exceptions: Supine 90/90 Hip PROM ER/IR:  L  55 / 20  R 45 / 30    Santosh Test (resting knee angle):  L 60 deg  R 58 deg    BUZZ:  L 80%  R 70%    Hamstring 90/90:  L -20  R  "-20    -ROS with any hip testing.    Active Range of Motion     Lumbar   Flexion: within functional limits (-ROS.  \"Just tight along back muscles.\")  Extension: decreased (+ROS.)  Left lateral flexion: within functional limits (-ROS.)  Right lateral flexion: within functional limits (-ROS.)  Left rotation: decreased (-ROS. Normal pelvic rotation; limited shoulder girdle rotation.)  Right rotation: decreased (-ROS. Normal pelvic rotation; limited shoulder girdle rotation.)    Joint Play     Additional Joint Play Details  Mild hypomobility throughout TS and LS with central and unilat PAs.  -ROS with testing.  Ambulation     Comments   Minimal pelvic and trunk rotation.  Minimal to no hip extension. Shortened stride length bilaterally.  Excessive IPSI lateral trunk sway during stance phase bilaterally.        Therapeutic Exercises (CPT 07714):     1. Supine hip flxr stretch, 1x1 min ea, HEP    2. Glute set, 1x15, 5 sec hold, HEP    3. Open books, 1x15 ea, HEP    4. Calf stretch, Not done 7/24/19    Therapeutic Treatments and Modalities:     1. Manual Therapy (CPT 04061), 1. Prone STM TS and LS parasp., 2. Prone central PAs, Gr II, T4-T12 levels., // Decr tension in back; improved trunk rotation walking.    Time-based treatments/modalities:  Manual therapy minutes (CPT 22927): 10 minutes  Therapeutic exercise minutes (CPT 94096): 10 minutes       Assessment, Response and Plan:   Impairments: abnormal or restricted ROM, impaired functional mobility, impaired physical strength, lacks appropriate home exercise program, limited ADL's and pain with function    Assessment details:  The patient is a 74 y.o. female with c/c of low back tightness with intermittent pain. Signs/symptoms consistent with mild degenerative changes as identified on recent x-rays. Significant contributing factors include hip extension and external rotation mobility deficits, decreased spinal accessory motion, and decreased trunk and hip strength. She " tolerated the interventions today very well and reported no discomfort at the end of the session. She will benefit from skilled PT to address the above impairments, provide education on a self-managed HEP, and facilitate a safe return to PLOF with minimal to no pain.    Barriers to therapy:  None  Prognosis: good    Goals:   Short Term Goals:   1. Independent with HEP.  2. Able to bend down to retrieve items from floor level without limitation by back pain or tightness.  3. Able to carry items of at least 15 pounds in order to perform work duties without limitation by or aggravation of back pain.  Short term goal time span:  2-4 weeks      Long Term Goals:    1. Independent with HEP and progressions/regressions of exercises and activities as necessary.  2. Able to put items on shelves at shoulder level and higher at work without limitation by or aggravation of low back pain  3. Able to carry items of at least 25 pounds in order to perform work duties without limitation by or aggravation of back pain.  Long term goal time span:  2-4 months    Plan:   Therapy options:  Physical therapy treatment to continue  Planned therapy interventions:  E Stim Unattended (CPT 87885), Functional Training, Self Care (CPT 30461), Gait Training (CPT 51652), Hot or Cold Pack Therapy (CPT 53092), Manual Therapy (CPT 68749), Neuromuscular Re-education (CPT 31101), Self Care ADL Training (CPT 00685), Therapeutic Activities (CPT 42371), Therapeutic Exercise (CPT 56889) and TENS Applicaton (CPT 52831)  Frequency:  2x week (1-2x/wk as needed.)  Duration in weeks:  8  Duration in visits:  12  Discussed with:  Patient      Functional Limitations and Severity Modifiers  Neck Disability Total: 7  Brandon Aidan Low Back Pain and Disability Score: 8.33     Referring provider co-signature:  I have reviewed this plan of care and my co-signature certifies the need for services.  Certification Dates:   From 07/24/2019     To 09/18/2019    Physician  Signature: ________________________________ Date: ______________

## 2019-07-24 NOTE — PROGRESS NOTES
Radha Kerr is a 74 y.o. female here for a non-provider visit for blood pressure check.     Pt has been measuring bp qam and qpm. After starting metoprolol 25mg bid pt noticed drops in bp to 90s/60s. She consulted her cardiologists office and was instructed to cut back to 12.5mg bid. Pt's bp this am was 87/62. Pt reports feeling dizziness and fatigue with the lower bps. Checked bp in office 152/88, pt's home cuff read 153/93. Pt reports feeling anxious about low bp numbers. Reviewed with Dr. Tesfaye and instructed pt to take 12.5mg daily and continue to monitor bp. Instructed to skip dose for low blood pressure or signs/symptoms of low bp. Advised pt to notify cardiologist's office as well. Pt agrees.     There were no vitals filed for this visit.  If abnormal, was an in office provider notified today? Yes  Routed to PCP? Yes

## 2019-07-31 ENCOUNTER — PHYSICAL THERAPY (OUTPATIENT)
Dept: PHYSICAL THERAPY | Facility: REHABILITATION | Age: 74
End: 2019-07-31
Attending: FAMILY MEDICINE
Payer: MEDICARE

## 2019-07-31 DIAGNOSIS — G89.29 CHRONIC MIDLINE LOW BACK PAIN WITHOUT SCIATICA: ICD-10-CM

## 2019-07-31 DIAGNOSIS — M54.2 CERVICALGIA: ICD-10-CM

## 2019-07-31 DIAGNOSIS — M54.50 CHRONIC MIDLINE LOW BACK PAIN WITHOUT SCIATICA: ICD-10-CM

## 2019-07-31 PROCEDURE — 97140 MANUAL THERAPY 1/> REGIONS: CPT

## 2019-07-31 PROCEDURE — 97110 THERAPEUTIC EXERCISES: CPT

## 2019-07-31 NOTE — OP THERAPY DAILY TREATMENT
Outpatient Physical Therapy  DAILY TREATMENT     [facility] [place] [address] [facility], [address]  [place] [address]  Phone:  [phone]    Date: 07/31/2019    Patient: Radha Kerr  YOB: 1945  MRN: [id number]     Time Calculation  Start time: 0930  Stop time: 1000 Time Calculation (min): 30 minutes       Chief Complaint: Back Problem    Visit #: 3    SUBJECTIVE:  Heart palpitations still happening some times; could be gas - has acid reflux problems too. Back is getting better gradually.    OBJECTIVE:  Current objective measures:  Supine Hip Rotn 90/90: L  60 / 20.  R 45 / 20  Hamstring 90/90: L -20.  R -30.     Standing LS Rotn:  R WNL.  L 75%, quality of motion more hesitant.            Therapeutic Exercises (CPT 49241):     1. Supine hip flxr stretch, 1x1 min ea, HEP    2. Glute set, 1x15, 5 sec hold, HEP    3. * Bridges, 1x10, HEP  // No discomfort.    4. Open books, 1x10 ea, HEP    5. Active HS stretch    6. 1/2 FR Calf stretch, 2x30 sec ea, HEP    Therapeutic Treatments and Modalities:     1. Manual Therapy (CPT 08728), 1. Prone STM TS and LS parasp., 2. Prone central PAs, Gr II, T4-T12 levels., // Decr tension in back; improved trunk rotation walking., 3. Gentle prone hip oscillations and PROM rotn.    Time-based treatments/modalities:  Manual therapy minutes (CPT 33671): 15 minutes  Therapeutic exercise minutes (CPT 28646): 15 minutes       Pain rating before treatment: 1  Pain rating after treatment: 1    ASSESSMENT:   Progressing well with decreased back pain and improved AROM. Good tolerance for exercise progressions today.    Will benefit from continued skilled PT to address remaining mobility deficits and trunk muscle strength deficits to improve functional mobility with less back pain.    PLAN/RECOMMENDATIONS:   Plan for treatment: therapy treatment to continue next visit.  Planned interventions for next visit: continue with current treatment.

## 2019-08-02 ENCOUNTER — TELEPHONE (OUTPATIENT)
Dept: MEDICAL GROUP | Facility: IMAGING CENTER | Age: 74
End: 2019-08-02

## 2019-08-02 NOTE — TELEPHONE ENCOUNTER
"Received phone call from patient wanting to notify us that this morning around 9am she started to get a \"cold sensation\" through her hands and her legs down into feet. Patient states she has had the cold sensation in her hands before but she is not sure what it is. Patient wanted to see Dr. Tesfaye but notified her that he does not work on Fridays. Patient would like to see what Dr. Tesfaye thinks this could possibly be. Let patient know message will be sent to Dr. Tesfaye and I will give her a call back today so see how she is feeling.   "

## 2019-08-02 NOTE — TELEPHONE ENCOUNTER
Reviewed with Dr. Tesfaye and he would like patient to try adding supplement Rutin 50 mg twice a day and follow-up with cardiology.   Notified patient and she states she saw cardiology last week and they told her everything looked good. Let patient know to see how she does over the weekend and follow-up with us next week.

## 2019-08-04 ENCOUNTER — HOSPITAL ENCOUNTER (EMERGENCY)
Facility: MEDICAL CENTER | Age: 74
End: 2019-08-05
Attending: EMERGENCY MEDICINE
Payer: MEDICARE

## 2019-08-04 DIAGNOSIS — R00.2 PALPITATIONS: ICD-10-CM

## 2019-08-04 DIAGNOSIS — E86.0 MILD DEHYDRATION: ICD-10-CM

## 2019-08-04 DIAGNOSIS — I49.1 PREMATURE ATRIAL CONTRACTION: ICD-10-CM

## 2019-08-04 DIAGNOSIS — E87.6 ACUTE HYPOKALEMIA: ICD-10-CM

## 2019-08-04 LAB
ALBUMIN SERPL BCP-MCNC: 4.3 G/DL (ref 3.2–4.9)
ALBUMIN/GLOB SERPL: 1.4 G/DL
ALP SERPL-CCNC: 72 U/L (ref 30–99)
ALT SERPL-CCNC: 27 U/L (ref 2–50)
ANION GAP SERPL CALC-SCNC: 6 MMOL/L (ref 0–11.9)
APPEARANCE UR: CLEAR
AST SERPL-CCNC: 22 U/L (ref 12–45)
BACTERIA #/AREA URNS HPF: ABNORMAL /HPF
BASOPHILS # BLD AUTO: 0.7 % (ref 0–1.8)
BASOPHILS # BLD: 0.08 K/UL (ref 0–0.12)
BILIRUB SERPL-MCNC: 0.6 MG/DL (ref 0.1–1.5)
BILIRUB UR QL STRIP.AUTO: NEGATIVE
BUN SERPL-MCNC: 18 MG/DL (ref 8–22)
CALCIUM SERPL-MCNC: 9.2 MG/DL (ref 8.4–10.2)
CHLORIDE SERPL-SCNC: 105 MMOL/L (ref 96–112)
CO2 SERPL-SCNC: 27 MMOL/L (ref 20–33)
COLOR UR: YELLOW
CREAT SERPL-MCNC: 0.75 MG/DL (ref 0.5–1.4)
EKG IMPRESSION: NORMAL
EOSINOPHIL # BLD AUTO: 0.16 K/UL (ref 0–0.51)
EOSINOPHIL NFR BLD: 1.3 % (ref 0–6.9)
EPI CELLS #/AREA URNS HPF: ABNORMAL /HPF
ERYTHROCYTE [DISTWIDTH] IN BLOOD BY AUTOMATED COUNT: 43.7 FL (ref 35.9–50)
GLOBULIN SER CALC-MCNC: 3.1 G/DL (ref 1.9–3.5)
GLUCOSE SERPL-MCNC: 110 MG/DL (ref 65–99)
GLUCOSE UR STRIP.AUTO-MCNC: NEGATIVE MG/DL
HCT VFR BLD AUTO: 41.5 % (ref 37–47)
HGB BLD-MCNC: 13.8 G/DL (ref 12–16)
IMM GRANULOCYTES # BLD AUTO: 0.04 K/UL (ref 0–0.11)
IMM GRANULOCYTES NFR BLD AUTO: 0.3 % (ref 0–0.9)
KETONES UR STRIP.AUTO-MCNC: NEGATIVE MG/DL
LEUKOCYTE ESTERASE UR QL STRIP.AUTO: ABNORMAL
LIPASE SERPL-CCNC: 62 U/L (ref 7–58)
LYMPHOCYTES # BLD AUTO: 3.13 K/UL (ref 1–4.8)
LYMPHOCYTES NFR BLD: 26.1 % (ref 22–41)
MCH RBC QN AUTO: 30.2 PG (ref 27–33)
MCHC RBC AUTO-ENTMCNC: 33.3 G/DL (ref 33.6–35)
MCV RBC AUTO: 90.8 FL (ref 81.4–97.8)
MICRO URNS: ABNORMAL
MONOCYTES # BLD AUTO: 0.75 K/UL (ref 0–0.85)
MONOCYTES NFR BLD AUTO: 6.3 % (ref 0–13.4)
MUCOUS THREADS #/AREA URNS HPF: ABNORMAL /HPF
NEUTROPHILS # BLD AUTO: 7.83 K/UL (ref 2–7.15)
NEUTROPHILS NFR BLD: 65.3 % (ref 44–72)
NITRITE UR QL STRIP.AUTO: NEGATIVE
NRBC # BLD AUTO: 0 K/UL
NRBC BLD-RTO: 0 /100 WBC
NT-PROBNP SERPL IA-MCNC: 87 PG/ML (ref 0–125)
PH UR STRIP.AUTO: 6 [PH] (ref 5–8)
PLATELET # BLD AUTO: 344 K/UL (ref 164–446)
PMV BLD AUTO: 9.4 FL (ref 9–12.9)
POTASSIUM SERPL-SCNC: 3.5 MMOL/L (ref 3.6–5.5)
PROT SERPL-MCNC: 7.4 G/DL (ref 6–8.2)
PROT UR QL STRIP: NEGATIVE MG/DL
RBC # BLD AUTO: 4.57 M/UL (ref 4.2–5.4)
RBC # URNS HPF: ABNORMAL /HPF
RBC UR QL AUTO: ABNORMAL
SODIUM SERPL-SCNC: 138 MMOL/L (ref 135–145)
SP GR UR STRIP.AUTO: <=1.005
TROPONIN T SERPL-MCNC: <6 NG/L (ref 6–19)
WBC # BLD AUTO: 12 K/UL (ref 4.8–10.8)
WBC #/AREA URNS HPF: ABNORMAL /HPF

## 2019-08-04 PROCEDURE — 81001 URINALYSIS AUTO W/SCOPE: CPT

## 2019-08-04 PROCEDURE — 85025 COMPLETE CBC W/AUTO DIFF WBC: CPT

## 2019-08-04 PROCEDURE — 84443 ASSAY THYROID STIM HORMONE: CPT

## 2019-08-04 PROCEDURE — 84484 ASSAY OF TROPONIN QUANT: CPT

## 2019-08-04 PROCEDURE — 700105 HCHG RX REV CODE 258: Performed by: EMERGENCY MEDICINE

## 2019-08-04 PROCEDURE — 80053 COMPREHEN METABOLIC PANEL: CPT

## 2019-08-04 PROCEDURE — 36415 COLL VENOUS BLD VENIPUNCTURE: CPT

## 2019-08-04 PROCEDURE — 99285 EMERGENCY DEPT VISIT HI MDM: CPT

## 2019-08-04 PROCEDURE — 93005 ELECTROCARDIOGRAM TRACING: CPT | Performed by: EMERGENCY MEDICINE

## 2019-08-04 PROCEDURE — 83880 ASSAY OF NATRIURETIC PEPTIDE: CPT

## 2019-08-04 PROCEDURE — 83690 ASSAY OF LIPASE: CPT

## 2019-08-04 RX ORDER — SODIUM CHLORIDE 9 MG/ML
1000 INJECTION, SOLUTION INTRAVENOUS ONCE
Status: COMPLETED | OUTPATIENT
Start: 2019-08-04 | End: 2019-08-05

## 2019-08-04 RX ORDER — POTASSIUM CHLORIDE 20 MEQ/1
20 TABLET, EXTENDED RELEASE ORAL ONCE
Status: COMPLETED | OUTPATIENT
Start: 2019-08-05 | End: 2019-08-05

## 2019-08-04 RX ADMIN — SODIUM CHLORIDE 1000 ML: 9 INJECTION, SOLUTION INTRAVENOUS at 23:28

## 2019-08-05 VITALS
HEIGHT: 59 IN | TEMPERATURE: 98.4 F | WEIGHT: 116.4 LBS | RESPIRATION RATE: 16 BRPM | DIASTOLIC BLOOD PRESSURE: 62 MMHG | SYSTOLIC BLOOD PRESSURE: 146 MMHG | HEART RATE: 63 BPM | OXYGEN SATURATION: 98 % | BODY MASS INDEX: 23.47 KG/M2

## 2019-08-05 LAB — TSH SERPL DL<=0.005 MIU/L-ACNC: 2.3 UIU/ML (ref 0.38–5.33)

## 2019-08-05 PROCEDURE — A9270 NON-COVERED ITEM OR SERVICE: HCPCS | Performed by: EMERGENCY MEDICINE

## 2019-08-05 PROCEDURE — 700102 HCHG RX REV CODE 250 W/ 637 OVERRIDE(OP): Performed by: EMERGENCY MEDICINE

## 2019-08-05 RX ADMIN — POTASSIUM CHLORIDE 20 MEQ: 1500 TABLET, EXTENDED RELEASE ORAL at 00:07

## 2019-08-05 NOTE — ED NOTES
D/c instructions provided to pt. Pt and family verbalized understanding of all instructions. Pt signed self out. Iv removed, catheter tip intact. Pt ambulated out of ED w/  and son. nad noted

## 2019-08-05 NOTE — ED PROVIDER NOTES
ED Provider Note    ED Provider Note    Scribed for Mariela Maldonado MD by Mariela Maldonado. 8/4/2019, 10:46 PM.    Primary care provider: Mitchel Tesfaye D.O.  Means of arrival: Private  History obtained from: Patient and son  History limited by: None    CHIEF COMPLAINT  Chief Complaint   Patient presents with   • Rapid Heart Beat     pt c/o multiple episodes since 1800 tonight keeping her from sleeping tonight       HPI  Radha Kerr is a 74 y.o. female who presents to the Emergency Department for evaluation of palpitations.  Patient relates that she experiences this chronically, she is been found to have premature atrial complexes on cardiac monitoring in the past.  She notes over tonight starting at about 6 PM she has had a sensation happen more frequently, to the extent that she is having difficulty sleeping.  Denies any chest pain but does note mild discomfort to both shoulders.  Mild nausea, no vomiting.  Symptoms seem to improve when she belches.  No dyspnea but she does relate fatigue.  No fever, no acute vomiting or diarrhea or preceding illness other than recent sinus infection.  Patient has no history of coronary artery disease, she denies any syncope tonight as well.    REVIEW OF SYSTEMS  Pertinent positives include palpitations, shoulder pain, fatigue, nausea. Pertinent negatives include no precordial chest pain, no dyspnea, no syncope, no vomiting.  All other systems reviewed and negative.    PAST MEDICAL HISTORY   has a past medical history of Anesthesia, Arthritis, CATARACT, GERD (gastroesophageal reflux disease), Glaucoma, Hyperlipidemia, and Urinary bladder disorder.    SURGICAL HISTORY   has a past surgical history that includes abdominal hysterectomy total; knee arthroscopy (7/13/2010); medial meniscectomy (7/13/2010); knee arthroscopy (1/9/2013); medial meniscectomy (1/9/2013); and colonoscopy (12/17/13).    SOCIAL HISTORY  Social History     Tobacco Use   • Smoking status: Never Smoker  "  • Smokeless tobacco: Never Used   Substance Use Topics   • Alcohol use: No     Alcohol/week: 0.0 oz   • Drug use: No      Social History     Substance and Sexual Activity   Drug Use No       FAMILY HISTORY  Family History   Problem Relation Age of Onset   • Heart Disease Mother         chf   • Cancer Father    • Stroke Father 70   • Diabetes Father        CURRENT MEDICATIONS  Home Medications     Reviewed by Kalie Castaneda R.N. (Registered Nurse) on 08/04/19 at 2254  Med List Status: Partial   Medication Last Dose Status   Acetaminophen (TYLENOL PO) 8/4/2019 Active   aspirin (ASA) 81 MG Chew Tab chewable tablet 8/4/2019 Active   Calcium-Magnesium-Vitamin D (CALCIUM MAGNESIUM PO) 8/4/2019 Active   D-MANNOSE PO 8/4/2019 Active   ibuprofen (MOTRIN) 200 MG Tab 8/3/2019 Active   latanoprost (XALATAN) 0.005 % SOLN 8/4/2019 Active   Non Formulary Request 8/4/2019 Active   NON SPECIFIED 8/4/2019 Active   raNITidine (ZANTAC) 150 MG Tab 8/4/2019 Active   sucralfate (CARAFATE) 1 GM/10ML Suspension 8/4/2019 Active   VITAMINS B1 B6 B12 PO 8/4/2019 Active   WHITE MULBERRY SC 8/4/2019 Active                ALLERGIES  Allergies   Allergen Reactions   • Metronidazole Unspecified     Rapid heart beat and chest pressure.    • Azithromycin      rash   • Ciprofloxacin    • Diclofenac    • Myrbetriq [Mirabegron]    • Other Drug      Anticolinergic   • Pcn [Penicillins]        PHYSICAL EXAM  VITAL SIGNS: /81   Pulse 64   Temp 37.1 °C (98.7 °F) (Temporal)   Resp 18   Ht 1.499 m (4' 11\")   Wt 52.8 kg (116 lb 6.5 oz)   SpO2 98%   BMI 23.51 kg/m²     General: Alert, no acute distress  Skin: Warm, dry, normal for ethnicity  Head: Normocephalic, atraumatic  Neck: Trachea midline, no tenderness, no JVD  Eye: PERRL, normal conjunctiva, sclera are anicteric  ENMT: Oral mucosa pink and dry, no pharyngeal erythema or exudate  Cardiovascular: Regular rate and rhythm, No murmur, Normal peripheral perfusion.  No peripheral " edema.  Respiratory: Lungs CTA, respirations are non-labored, breath sounds are equal  Gastrointestinal: Soft, nontender, non distended  Musculoskeletal: No swelling, no deformity  Neurological: Alert and oriented to person, place, time, and situation  Lymphatics: No lymphadenopathy  Psychiatric: Cooperative, minimally anxious but otherwise appropriate mood & affect      DIAGNOSTIC STUDIES/PROCEDURES    LABS  Results for orders placed or performed during the hospital encounter of 08/04/19   CBC w/ Differential   Result Value Ref Range    WBC 12.0 (H) 4.8 - 10.8 K/uL    RBC 4.57 4.20 - 5.40 M/uL    Hemoglobin 13.8 12.0 - 16.0 g/dL    Hematocrit 41.5 37.0 - 47.0 %    MCV 90.8 81.4 - 97.8 fL    MCH 30.2 27.0 - 33.0 pg    MCHC 33.3 (L) 33.6 - 35.0 g/dL    RDW 43.7 35.9 - 50.0 fL    Platelet Count 344 164 - 446 K/uL    MPV 9.4 9.0 - 12.9 fL    Neutrophils-Polys 65.30 44.00 - 72.00 %    Lymphocytes 26.10 22.00 - 41.00 %    Monocytes 6.30 0.00 - 13.40 %    Eosinophils 1.30 0.00 - 6.90 %    Basophils 0.70 0.00 - 1.80 %    Immature Granulocytes 0.30 0.00 - 0.90 %    Nucleated RBC 0.00 /100 WBC    Neutrophils (Absolute) 7.83 (H) 2.00 - 7.15 K/uL    Lymphs (Absolute) 3.13 1.00 - 4.80 K/uL    Monos (Absolute) 0.75 0.00 - 0.85 K/uL    Eos (Absolute) 0.16 0.00 - 0.51 K/uL    Baso (Absolute) 0.08 0.00 - 0.12 K/uL    Immature Granulocytes (abs) 0.04 0.00 - 0.11 K/uL    NRBC (Absolute) 0.00 K/uL   Complete Metabolic Panel (CMP)   Result Value Ref Range    Sodium 138 135 - 145 mmol/L    Potassium 3.5 (L) 3.6 - 5.5 mmol/L    Chloride 105 96 - 112 mmol/L    Co2 27 20 - 33 mmol/L    Anion Gap 6.0 0.0 - 11.9    Glucose 110 (H) 65 - 99 mg/dL    Bun 18 8 - 22 mg/dL    Creatinine 0.75 0.50 - 1.40 mg/dL    Calcium 9.2 8.4 - 10.2 mg/dL    AST(SGOT) 22 12 - 45 U/L    ALT(SGPT) 27 2 - 50 U/L    Alkaline Phosphatase 72 30 - 99 U/L    Total Bilirubin 0.6 0.1 - 1.5 mg/dL    Albumin 4.3 3.2 - 4.9 g/dL    Total Protein 7.4 6.0 - 8.2 g/dL    Globulin  3.1 1.9 - 3.5 g/dL    A-G Ratio 1.4 g/dL   proBrain Natriuretic Peptide, NT   Result Value Ref Range    NT-proBNP 87 0 - 125 pg/mL   Troponin STAT   Result Value Ref Range    Troponin T <6 6 - 19 ng/L   Lipase   Result Value Ref Range    Lipase 62 (H) 7 - 58 U/L   Urinalysis, culture if indicated   Result Value Ref Range    Color Yellow     Character Clear     Specific Gravity <=1.005 <1.035    Ph 6.0 5.0 - 8.0    Glucose Negative Negative mg/dL    Ketones Negative Negative mg/dL    Protein Negative Negative mg/dL    Bilirubin Negative Negative    Nitrite Negative Negative    Leukocyte Esterase Small (A) Negative    Occult Blood Trace (A) Negative    Micro Urine Req Microscopic    TSH (for screening thyroid dysfunction)   Result Value Ref Range    TSH 2.300 0.380 - 5.330 uIU/mL   URINE MICROSCOPIC (W/UA)   Result Value Ref Range    WBC 0-2 /hpf    RBC 0-2 /hpf    Bacteria Rare (A) None /hpf    Epithelial Cells Few Few /hpf    Mucous Threads Rare /hpf   ESTIMATED GFR   Result Value Ref Range    GFR If African American >60 >60 mL/min/1.73 m 2    GFR If Non African American >60 >60 mL/min/1.73 m 2   EKG   Result Value Ref Range    Report       [facility] Emergency Dept.    Test Date:  2019  Pt Name:    AGUS QUESADA                   Department: [facility]  MRN:        [id number]                      [facility]  Gender:     Female                       Technician: PORSHA  :        1945                   Requested By:EDUARDO GEIGER  Order #:    [id number]                    Reading MD:    Measurements  Intervals                                Axis  Rate:       70                           P:          63  IN:         160                          QRS:        8  QRSD:       92                           T:          31  QT:         390  QTc:        421    Interpretive Statements  Sinus rhythm  Atrial premature complex  Abnormal R-wave progression, early transition  Compared to ECG  07/17/2019 15:37:43  Sinus tachycardia no longer present       All labs reviewed by me, mild leukocytosis without left shift, likely hemoconcentration consistent with volume depletion.    EKG  12 Lead EKG obtained at 2242 and interpreted by me to show:  Rhythm: Normal sinus rhythm   Rate: 70  Axis: Normal  Intervals: Normal  Q Waves: Normal  No diagnostic ST segment elevation  Single PAC  Clinical Impression: Normal EKG  Compared to March 29, 2018, no significant change        COURSE & MEDICAL DECISION MAKING  Pertinent Labs & Imaging studies reviewed. (See chart for details)    10:46 PM - Patient seen and examined at bedside. Patient will be treated with 500 cc crystalloid bolus. Ordered cardiac work-up and cardiac monitoring to evaluate her symptoms. The differential diagnoses include but are not limited to: Electrolyte abnormality, dehydration, premature atrial contractions    0012: Patient reassessed, updated with unremarkable work-up with than mild hypokalemia.  Patient's lipase is minimally elevated, but given no abdominal pain or vomiting this is clearly not representative of acute pancreatitis.  I have ordered potassium repletion, 20 mEq p.o.  Initial hypertension is improved without antihypertensive intervention, currently 160/70.  Relieved here of unremarkable studies.    HYDRATION: Based on the patient's presentation of Dehydration the patient was given IV fluids. IV Hydration was used because oral hydration was not as rapid as required. Upon recheck following hydration, the patient was Feeling better, heart rate improved, currently 62 normal sinus rhythm.    Decision Making:  This is a 74 y.o. year old female who presents with sensation of palpitations on and off without syncope or precordial chest pain.  Is well-appearing, there is a family history of heart disease and patient herself has a history of dyslipidemia.  Given her age, family history, dyslipidemia but otherwise unremarkable presentation and  "work-up including nonischemic EKG and troponin her heart score is 3; this is a very unlikely presentation of ACS.  Patient does have dry oral mucous membranes, hemoconcentration on the lab work-up, and given the palpitations IV fluid resuscitation is indicated.  Patient is concern for potential CHF, she relates she is been told on previous echocardiogram she has a \"weak heart\", as such have also obtained a BNP.  However I reviewed echocardiogram in the chart dated 2015 that shows a normal ejection fraction, 65%.  BNP is also unremarkable, there is no evidence suggestive of CHF.  There is mild hypokalemia which could be exacerbating her PAC.  I reassured the patient and family that this is not a life-threatening dysrhythmia, there is no indication for inpatient management at this point, she is comfortable following up with her cardiologist and has an appointment already scheduled in 2 weeks.    The patient will return for new or worsening symptoms and is stable at the time of discharge.    Patient has had high blood pressure while in the emergency department, felt likely secondary to medical condition. Counseled patient to monitor blood pressure at home and follow up with primary care physician.     DISPOSITION:  Patient will be discharged home in stable condition.    FOLLOW UP:  Gerber Strickland M.D.  [address] [id number] [phone]    Schedule an appointment as soon as possible for a visit in 2 weeks        OUTPATIENT MEDICATIONS:  New Prescriptions    No medications on file         FINAL IMPRESSION  1. Premature atrial contraction    2. Mild dehydration    3. Acute hypokalemia    4. Palpitations          Mariela LOYOLA), am scribing for, and in the presence of, Mariela Maldonado MD.    Electronically signed by: Mariela Sanabria), 8/4/2019    IMariela MD personally performed the services described in this documentation, as scribed by " Mariela Maldonado in my presence, and it is both accurate and complete    The note accurately reflects work and decisions made by me.  Mariela Maldonado  8/5/2019  12:15 AM

## 2019-08-05 NOTE — ED TRIAGE NOTES
"Chief Complaint   Patient presents with   • Rapid Heart Beat     pt c/o multiple episodes since 1800 tonight keeping her from sleeping tonight     Pt ambulates to triage with stable gait, alert and oriented, regular unlabored respirations. Pt states no palpitations or pain at this time, pt complained to son of dizziness and upper back/shoulder pain yesterday.    /81   Pulse 64   Temp 37.1 °C (98.7 °F) (Temporal)   Resp 18   Ht 1.499 m (4' 11\")   Wt 52.8 kg (116 lb 6.5 oz)   SpO2 98%   BMI 23.51 kg/m²     "

## 2019-08-05 NOTE — ED NOTES
"Presents to ED c/o \"palpitations\" intermittently since 1800 tonight. Reports feeling \"woke her up from sleep\" tonight. Denies any SOB or cp at this time. Only c/o fatigue at this time.   "

## 2019-08-07 ENCOUNTER — PHYSICAL THERAPY (OUTPATIENT)
Dept: PHYSICAL THERAPY | Facility: REHABILITATION | Age: 74
End: 2019-08-07
Attending: FAMILY MEDICINE
Payer: MEDICARE

## 2019-08-07 DIAGNOSIS — M54.50 CHRONIC MIDLINE LOW BACK PAIN WITHOUT SCIATICA: ICD-10-CM

## 2019-08-07 DIAGNOSIS — M54.2 CERVICALGIA: ICD-10-CM

## 2019-08-07 DIAGNOSIS — G89.29 CHRONIC MIDLINE LOW BACK PAIN WITHOUT SCIATICA: ICD-10-CM

## 2019-08-07 PROCEDURE — 97140 MANUAL THERAPY 1/> REGIONS: CPT

## 2019-08-07 PROCEDURE — 97110 THERAPEUTIC EXERCISES: CPT

## 2019-08-07 NOTE — OP THERAPY DAILY TREATMENT
Outpatient Physical Therapy  DAILY TREATMENT     [facility] Outpatient Physical Therapy [place] [address] [address] [facility], [address]  [place] [address]  Phone:  [phone]    Date: 08/07/2019    Patient: Radha Kerr  YOB: 1945  MRN: [id number]     Time Calculation  Start time: 0930  Stop time: 1000 Time Calculation (min): 30 minutes       Chief Complaint: Back Problem    Visit #: 4    SUBJECTIVE:  Pt was in ED on Sunday due to heart palpitations - she was unable to sleep that night due to heart symptoms. She was dehydrated and had low potassium. Felt better after receiving IV. Back pain is on and off; overall getting better.    L knee is sore today. Presents with knee brace in place today. Broke this knee cap last year in June.    OBJECTIVE:  Current objective measures:  Supine Hip Rotn 90/90: L  60 / 20.  R 45 / 20  Hamstring 90/90: L -20.  R -30.    Santosh Test:     Standing LS Rotn:  R WNL.  L 75%, quality of motion more hesitant.            Therapeutic Exercises (CPT 55617):     1. Supine hip flxr stretch, 1x1 min ea, HEP    2. Glute set, 1x15, 5 sec hold, HEP    3. * Bridges, 1x10, HEP  // No discomfort.    4. Open books, 1x10 ea, HEP    5. Active HS stretch    6. 1/2 FR Calf stretch, 2x30 sec ea, HEP    Therapeutic Treatments and Modalities:     1. Manual Therapy (CPT 59012), 1. Prone STM TS and LS parasp., 2. Prone central PAs, Gr II, T4-T12 levels., // Decr tension in back; improved trunk rotation walking., 3. Patellar mobs, sup-inf., // Improved patellar mobility; no change in knee pain., 4. Supine STM L bennie-lateral thigh., // Decr L knee pain standing/walking; improved ease with SLR.    Time-based treatments/modalities:  Manual therapy minutes (CPT 14593): 15 minutes  Therapeutic exercise minutes (CPT 20273): 15 minutes       Pain rating before treatment: 1  Pain rating after treatment: 0-1    ASSESSMENT:   Pt continues progressing well with decreased back pain and improved AROM. Knee pain interfering  with work duties and some exercises, so focused on tissue restrictions contributing to knee pain. Pt reported relief at end of session. Included extra rest breaks today.    Will benefit from continued skilled PT to address remaining mobility deficits and trunk muscle strength deficits to improve functional mobility with less back pain.    PLAN/RECOMMENDATIONS:   Plan for treatment: therapy treatment to continue next visit.  Planned interventions for next visit: continue with current treatment.

## 2019-08-14 ENCOUNTER — PHYSICAL THERAPY (OUTPATIENT)
Dept: PHYSICAL THERAPY | Facility: REHABILITATION | Age: 74
End: 2019-08-14
Attending: FAMILY MEDICINE
Payer: MEDICARE

## 2019-08-14 DIAGNOSIS — M54.2 CERVICALGIA: ICD-10-CM

## 2019-08-14 DIAGNOSIS — G89.29 CHRONIC MIDLINE LOW BACK PAIN WITHOUT SCIATICA: ICD-10-CM

## 2019-08-14 DIAGNOSIS — M54.50 CHRONIC MIDLINE LOW BACK PAIN WITHOUT SCIATICA: ICD-10-CM

## 2019-08-14 PROCEDURE — 97535 SELF CARE MNGMENT TRAINING: CPT

## 2019-08-14 PROCEDURE — 97110 THERAPEUTIC EXERCISES: CPT

## 2019-08-14 NOTE — OP THERAPY DAILY TREATMENT
Outpatient Physical Therapy  DAILY TREATMENT     [facility] Outpatient Physical Therapy [place] [address] [address] [facility], [address]  [place] [address]  Phone:  [phone]    Date: 08/14/2019    Patient: Radha Kerr  YOB: 1945  MRN: [id number]     Time Calculation  Start time: 0900  Stop time: 0930 Time Calculation (min): 30 minutes       Chief Complaint: Back Problem    Visit #: 5    SUBJECTIVE:  L knee is feeling better. Back is still sore after doing house work.    OBJECTIVE:  Current objective measures:  Supine Hip Rotn 90/90: L  60 / 20.  R 45 / 20  Hamstring 90/90: L -20.  R -30.    Santosh Test:     Standing LS Rotn:  R WNL.  L 75%, quality of motion more hesitant.            Therapeutic Exercises (CPT 14453):     1. Supine hip flxr stretch, 1x1 min ea, HEP    2. Glute set, 1x15, 5 sec hold, HEP    3. * Bridges, 1x15, HEP    4. Open books, 1x10 ea, HEP    5. Active HS stretch    6. 1/2 FR Calf stretch, 2x30 sec ea, HEP    8. S/Ling clams, 1x15 ea, HEP    Therapeutic Treatments and Modalities:     1. Functional Training, Self Care (CPT 53967), 1. Neutral spine during simulation of hosue cleaning and other daily activities, practice using hip hinge and split stance.    Therapeutic Treatment and Modalities Summary: Previous:  1. Prone STM TS and LS parasp.  2. Prone central PAs, Gr II, T4-T12 levels.  3. Patellar mobs, sup-inf.  4. Supine STM L bennie-lateral thigh.    Time-based treatments/modalities:  Therapeutic exercise minutes (CPT 21577): 15 minutes  Functional training, self care minutes (CPT 46831): 15 minutes       Pain rating before treatment: 2-3  Pain rating after treatment: 1-2    ASSESSMENT:   Progressed well since last session with less knee pain. Back pain still aggravated by high repetitions of bending during house work. Good ability to maintain neutral spine during house work activity simulation.    Will benefit from continued skilled PT to address remaining mobility deficits and trunk muscle  strength deficits to improve functional mobility with less back pain.    PLAN/RECOMMENDATIONS:   Plan for treatment: therapy treatment to continue next visit.  Planned interventions for next visit: continue with current treatment.

## 2019-08-21 ENCOUNTER — PHYSICAL THERAPY (OUTPATIENT)
Dept: PHYSICAL THERAPY | Facility: REHABILITATION | Age: 74
End: 2019-08-21
Attending: FAMILY MEDICINE
Payer: MEDICARE

## 2019-08-21 ENCOUNTER — OFFICE VISIT (OUTPATIENT)
Dept: URGENT CARE | Facility: CLINIC | Age: 74
End: 2019-08-21
Payer: MEDICARE

## 2019-08-21 VITALS
BODY MASS INDEX: 23.16 KG/M2 | TEMPERATURE: 97.9 F | RESPIRATION RATE: 14 BRPM | HEIGHT: 60 IN | HEART RATE: 68 BPM | WEIGHT: 118 LBS | OXYGEN SATURATION: 96 % | SYSTOLIC BLOOD PRESSURE: 118 MMHG | DIASTOLIC BLOOD PRESSURE: 72 MMHG

## 2019-08-21 DIAGNOSIS — M54.50 CHRONIC MIDLINE LOW BACK PAIN WITHOUT SCIATICA: ICD-10-CM

## 2019-08-21 DIAGNOSIS — R21 RASH: ICD-10-CM

## 2019-08-21 DIAGNOSIS — M54.2 CERVICALGIA: ICD-10-CM

## 2019-08-21 DIAGNOSIS — G89.29 CHRONIC MIDLINE LOW BACK PAIN WITHOUT SCIATICA: ICD-10-CM

## 2019-08-21 DIAGNOSIS — K13.79 MOUTH SORES: ICD-10-CM

## 2019-08-21 PROCEDURE — 99214 OFFICE O/P EST MOD 30 MIN: CPT | Performed by: PHYSICIAN ASSISTANT

## 2019-08-21 PROCEDURE — 97110 THERAPEUTIC EXERCISES: CPT

## 2019-08-21 RX ORDER — METHYLPREDNISOLONE 4 MG/1
TABLET ORAL
COMMUNITY
Start: 2019-07-16 | End: 2019-09-04 | Stop reason: SINTOL

## 2019-08-21 RX ORDER — HYDROXYZINE PAMOATE 25 MG/1
25 CAPSULE ORAL 3 TIMES DAILY PRN
Qty: 30 CAP | Refills: 0 | Status: SHIPPED | OUTPATIENT
Start: 2019-08-21 | End: 2019-10-15

## 2019-08-21 NOTE — OP THERAPY DISCHARGE SUMMARY
Outpatient Physical Therapy  DISCHARGE SUMMARY NOTE      [facility] [place] [address] [facility], [address]  [place] [address]  Phone:  [phone]    Date of Visit: 08/21/2019    Patient: Radha Kerr  YOB: 1945  MRN: [id number]     Referring Provider: Mitchel Tesfaye D.O.  [address] [place] [address]   Referring Diagnosis Cervicalgia [M54.2];Low back pain [M54.5];Other chronic pain [G89.29]     Physical Therapy Occurrence Codes    Date of onset of impairment:  5/24/19   Date physical therapy care plan established or reviewed:  7/17/19   Date physical therapy treatment started:  7/17/19          Functional Assessment Used  Brandon Aidan Low Back Pain and Disability Score: 0     Your patient is being discharged from Physical Therapy with the following comments:   · Goals met    Comments:  Overall, the patient has made very good progress with PT. She reports significant improvement in her back pain. She is now able to perform standing LS AROM without pain. She demonstrates good technique with her HEP and states she is comfortable with DC from PT today.    Recommendations:  DC from PT.    Familia Leyva, PT, DPT, OCS    Date: 8/21/2019

## 2019-08-23 ASSESSMENT — ENCOUNTER SYMPTOMS
COUGH: 0
EYE DISCHARGE: 0
VOMITING: 0
SORE THROAT: 1
DIARRHEA: 0
FEVER: 0
EYE REDNESS: 0
CHILLS: 0
SHORTNESS OF BREATH: 0
WHEEZING: 0

## 2019-08-23 NOTE — PROGRESS NOTES
"Subjective:      Radha Kerr is a 74 y.o. female who presents with Rash (x3days, rash on left knee right elbow, lower back, sore mouth, itching)            Pt is a 75 y/o female who presents to  with very itchy rash to her left knee, right elbow and lower back for the last 2 to 3 days.  She became more concerned when her mouth began to be sore yesterday which she noticed possible sores to the upper roof of her mouth this morning.  She denies any fevers, chills.  She also denies any ill contacts- however she is around large groups of people consistently.  She denies any new soaps, detergents, lotions or medications.    Rash   This is a new problem. Episode onset: 3 days ago. Location: as above.  The rash is characterized by redness and itchiness. It is unknown if there was an exposure to a precipitant. Associated symptoms include a sore throat. Pertinent negatives include no cough, diarrhea, facial edema, fever, shortness of breath or vomiting. Treatments tried: Ice water. Topical Benadryl.  The treatment provided mild relief.       Review of Systems   Constitutional: Negative for chills and fever.   HENT: Positive for sore throat.    Eyes: Negative for discharge and redness.   Respiratory: Negative for cough, shortness of breath and wheezing.    Gastrointestinal: Negative for diarrhea and vomiting.   Skin: Positive for itching and rash.   All other systems reviewed and are negative.         Objective:     /72 (BP Location: Left arm, Patient Position: Sitting, BP Cuff Size: Adult)   Pulse 68   Temp 36.6 °C (97.9 °F) (Temporal)   Resp 14   Ht 1.511 m (4' 11.5\")   Wt 53.5 kg (118 lb)   SpO2 96%   BMI 23.43 kg/m²    PMH:  has a past medical history of Anesthesia, Arthritis, CATARACT, GERD (gastroesophageal reflux disease), Glaucoma, Hyperlipidemia, and Urinary bladder disorder.  MEDS:   Current Outpatient Medications:   •  mag hydrox-al hydrox-simeth-diphenhydrAMINE-lidocaine, Swish, gargle, and spit, one " to two teaspoonfuls every six hours as needed. Shake well before using., Disp: 120 mL, Rfl: 0  •  hydrOXYzine pamoate (VISTARIL) 25 MG Cap, Take 1 Cap by mouth 3 times a day as needed for Other (May cause sedation)., Disp: 30 Cap, Rfl: 0  •  NON SPECIFIED, , Disp: , Rfl:   •  WHITE MULBERRY SC, Inject  as instructed., Disp: , Rfl:   •  Calcium-Magnesium-Vitamin D (CALCIUM MAGNESIUM PO), Take  by mouth every day., Disp: , Rfl:   •  ibuprofen (MOTRIN) 200 MG Tab, Take 200 mg by mouth every 6 hours as needed., Disp: , Rfl:   •  raNITidine (ZANTAC) 150 MG Tab, Take 150 mg by mouth 2 times a day., Disp: , Rfl:   •  Non Formulary Request, Strontium, MWF, Disp: , Rfl:   •  VITAMINS B1 B6 B12 PO, Take  by mouth., Disp: , Rfl:   •  D-MANNOSE PO, Take 500 mg by mouth 2 Times a Day., Disp: , Rfl:   •  aspirin (ASA) 81 MG Chew Tab chewable tablet, Take 81 mg by mouth every day., Disp: , Rfl:   •  Acetaminophen (TYLENOL PO), Take  by mouth., Disp: , Rfl:   •  latanoprost (XALATAN) 0.005 % SOLN, Place 1 Drop in both eyes every evening. Lt eye daily , Disp: , Rfl:   •  methylPREDNISolone (MEDROL DOSEPAK) 4 MG Tablet Therapy Pack, , Disp: , Rfl:   ALLERGIES:   Allergies   Allergen Reactions   • Metronidazole Unspecified     Rapid heart beat and chest pressure.    • Azithromycin      rash   • Ciprofloxacin    • Diclofenac    • Doxycycline    • Methylprednisolone    • Myrbetriq [Mirabegron]    • Other Drug      Anticolinergic   • Pcn [Penicillins]      SURGHX:   Past Surgical History:   Procedure Laterality Date   • COLONOSCOPY  12/17/13    2 polyps   • KNEE ARTHROSCOPY  1/9/2013    Performed by Seun Giles M.D. at SURGERY [facility] ORS   • MEDIAL MENISCECTOMY  1/9/2013    Performed by Seun Giles M.D. at SURGERY [facility] ORS   • KNEE ARTHROSCOPY  7/13/2010    Performed by HAL WRIGHT at SURGERY SAME DAY [facility] ORS   • MEDIAL MENISCECTOMY  7/13/2010    Performed by HAL WRIGHT at SURGERY SAME DAY [facility] ORS    • ABDOMINAL HYSTERECTOMY TOTAL      gall bladder     SOCHX:  reports that she has never smoked. She has never used smokeless tobacco. She reports that she does not drink alcohol or use drugs.  FH: Family history was reviewed, no pertinent findings to report    Physical Exam   Constitutional: She is oriented to person, place, and time. She appears well-developed and well-nourished. No distress.   HENT:   Head: Normocephalic and atraumatic.   Right Ear: External ear normal.   Left Ear: External ear normal.   Mouth/Throat: No oropharyngeal exudate.       Scattered ulcerative lesions to hard palate- and scattered to gingiva.    Eyes: Pupils are equal, round, and reactive to light. Conjunctivae and EOM are normal.   Neck: Normal range of motion. Neck supple. No tracheal deviation present.   Cardiovascular: Normal rate and regular rhythm.   No murmur heard.  Pulmonary/Chest: Effort normal. No respiratory distress.   Musculoskeletal: Normal range of motion. She exhibits no edema.   Neurological: She is alert and oriented to person, place, and time. Coordination normal.   Skin: Skin is warm. Rash noted. Rash is urticarial.        Psychiatric: She has a normal mood and affect. Her behavior is normal. Judgment and thought content normal.   Vitals reviewed.              Assessment/Plan:     1. Mouth sores  - mag hydrox-al hydrox-simeth-diphenhydrAMINE-lidocaine; Swish, gargle, and spit, one to two teaspoonfuls every six hours as needed. Shake well before using.  Dispense: 120 mL; Refill: 0    2. Rash  - hydrOXYzine pamoate (VISTARIL) 25 MG Cap; Take 1 Cap by mouth 3 times a day as needed for Other (May cause sedation).  Dispense: 30 Cap; Refill: 0    Discussed mouth sores maybe due to viral etiology- sores appear consistent with Coxsackie however other rash- more consistent with urticaria.   Will start the patient on the above.   Discussed the importance of changing her toothbrush- avoid scratching. And discussed worrisome  s/s to monitor for.   Patient given precautionary s/sx that mandate immediate follow up and evaluation in the ED. Advised of risks of not doing so.    DDX, Supportive care, and indications for immediate follow-up discussed with patient.    Instructed to return to clinic or nearest emergency department if we are not available for any change in condition, further concerns, or worsening of symptoms.    The patient demonstrated a good understanding and agreed with the treatment plan.  Please note that this dictation was created using voice recognition software. I have made every reasonable attempt to correct obvious errors, but I expect that there are errors of grammar and possibly content that I did not discover before finalizing the note.

## 2019-08-28 ENCOUNTER — APPOINTMENT (RX ONLY)
Dept: URBAN - METROPOLITAN AREA CLINIC 20 | Facility: CLINIC | Age: 74
Setting detail: DERMATOLOGY
End: 2019-08-28

## 2019-08-28 DIAGNOSIS — L30.9 DERMATITIS, UNSPECIFIED: ICD-10-CM

## 2019-08-28 PROCEDURE — 99214 OFFICE O/P EST MOD 30 MIN: CPT

## 2019-08-28 PROCEDURE — ? PRESCRIPTION

## 2019-08-28 PROCEDURE — ? COUNSELING

## 2019-08-28 RX ORDER — TRIAMCINOLONE ACETONIDE 1 MG/G
CREAM TOPICAL BID
Qty: 1 | Refills: 0 | Status: ERX | COMMUNITY
Start: 2019-08-28

## 2019-08-28 RX ADMIN — TRIAMCINOLONE ACETONIDE 1: 1 CREAM TOPICAL at 00:00

## 2019-08-28 ASSESSMENT — LOCATION SIMPLE DESCRIPTION DERM
LOCATION SIMPLE: ABDOMEN
LOCATION SIMPLE: RIGHT LOWER BACK

## 2019-08-28 ASSESSMENT — LOCATION ZONE DERM: LOCATION ZONE: TRUNK

## 2019-08-28 ASSESSMENT — LOCATION DETAILED DESCRIPTION DERM
LOCATION DETAILED: PERIUMBILICAL SKIN
LOCATION DETAILED: RIGHT INFERIOR MEDIAL MIDBACK

## 2019-08-28 NOTE — HPI: RASH
What Type Of Note Output Would You Prefer (Optional)?: Standard Output
How Severe Is Your Rash?: moderate
Is This A New Presentation, Or A Follow-Up?: Rash
Additional History: Patient had started new medication Metoprolol by PCP last month, then had noticed rash on her back which has now spread, she had went to Urgent care for the rash in which they had given her medication and has not helped , she has now came into the office for further evaluation

## 2019-08-29 ENCOUNTER — OFFICE VISIT (OUTPATIENT)
Dept: MEDICAL GROUP | Facility: IMAGING CENTER | Age: 74
End: 2019-08-29
Payer: MEDICARE

## 2019-08-29 VITALS
OXYGEN SATURATION: 99 % | TEMPERATURE: 98.1 F | HEIGHT: 60 IN | RESPIRATION RATE: 12 BRPM | WEIGHT: 117 LBS | SYSTOLIC BLOOD PRESSURE: 132 MMHG | BODY MASS INDEX: 22.97 KG/M2 | DIASTOLIC BLOOD PRESSURE: 84 MMHG | HEART RATE: 80 BPM

## 2019-08-29 DIAGNOSIS — R00.2 HEART PALPITATIONS: ICD-10-CM

## 2019-08-29 DIAGNOSIS — M54.50 ACUTE MIDLINE LOW BACK PAIN WITHOUT SCIATICA: ICD-10-CM

## 2019-08-29 DIAGNOSIS — R73.01 IMPAIRED FASTING GLUCOSE: ICD-10-CM

## 2019-08-29 RX ORDER — DIPHENHYDRAMINE HCL 25 MG
25 TABLET ORAL EVERY 6 HOURS PRN
COMMUNITY
End: 2021-01-21

## 2019-08-29 ASSESSMENT — PATIENT HEALTH QUESTIONNAIRE - PHQ9: CLINICAL INTERPRETATION OF PHQ2 SCORE: 0

## 2019-08-29 ASSESSMENT — PAIN SCALES - GENERAL: PAINLEVEL: 5=MODERATE PAIN

## 2019-08-29 NOTE — PROGRESS NOTES
Chief Complaint   Patient presents with   • Rash     seen at  8/21, improving   • Eye Pain     left eye pain into temple   • Palpitations     stopped metoprolol 8/23/19     Subjective:   Radha Kerr is a 74 y.o. female here today for multiple problems as listed below.      Acute midline low back pain without sciatica  Her typical pain has been more significant with the neck and behind the eye that seems to have movement to right temporal too but without palpation tenderness and vision changes besides transient blinder sensation that was exame=ined by her eye doctor and continued with follow up    Impaired fasting glucose  Discussed of the other options and the continued treatment plan     Depression Screening    Little interest or pleasure in doing things?  0 - not at all  Feeling down, depressed , or hopeless? 0 - not at all  Patient Health Questionnaire Score: 0    If depressive symptoms identified deferred to follow up visit unless specifically addressed in assesment and plan.      Interpretation of PHQ-9 Total Score   Score Severity   1-4 Minimal Depression   5-9 Mild Depression   10-14 Moderate Depression   15-19 Moderately Severe Depression   20-27 Severe Depression    Current medicines (including changes today)  Current Outpatient Medications   Medication Sig Dispense Refill   • diphenhydrAMINE (BENADRYL) 25 MG Tab Take 25 mg by mouth every 6 hours as needed for Sleep.     • hydrOXYzine pamoate (VISTARIL) 25 MG Cap Take 1 Cap by mouth 3 times a day as needed for Other (May cause sedation). 30 Cap 0   • NON SPECIFIED      • WHITE MULBERRY SC Inject  as instructed.     • Calcium-Magnesium-Vitamin D (CALCIUM MAGNESIUM PO) Take  by mouth every day.     • ibuprofen (MOTRIN) 200 MG Tab Take 200 mg by mouth every 6 hours as needed.     • raNITidine (ZANTAC) 150 MG Tab Take 150 mg by mouth 2 times a day.     • D-MANNOSE PO Take 500 mg by mouth 2 Times a Day.     • aspirin (ASA) 81 MG Chew Tab chewable tablet Take 81  "mg by mouth every day.     • Acetaminophen (TYLENOL PO) Take  by mouth.     • latanoprost (XALATAN) 0.005 % SOLN Place 1 Drop in both eyes every evening. Lt eye daily      • methylPREDNISolone (MEDROL DOSEPAK) 4 MG Tablet Therapy Pack      • mag hydrox-al hydrox-simeth-diphenhydrAMINE-lidocaine Swish, gargle, and spit, one to two teaspoonfuls every six hours as needed. Shake well before using. (Patient not taking: Reported on 8/29/2019) 120 mL 0   • Non Formulary Request Strontium, MWF       No current facility-administered medications for this visit.      She  has a past medical history of Anesthesia, Arthritis, CATARACT, Chronic frontal sinusitis (7/16/2019), GERD (gastroesophageal reflux disease), Glaucoma, Hyperlipidemia, and Urinary bladder disorder.    ROS  No chest pain, no shortness of breath, no abdominal pain, no diarrhea/constipation, no urinary symptoms.     Objective:     /84   Pulse 80   Temp 36.7 °C (98.1 °F)   Resp 12   Ht 1.511 m (4' 11.5\")   Wt 53.1 kg (117 lb)   SpO2 99%  Body mass index is 23.24 kg/m².  Physical Exam:  Alert, oriented in no acute distress.  Eye contact is good, speech goal directed, affect calm  HEENT: conjunctiva non-injected, sclera non-icteric.    Neck: No adenopathy or masses in the neck or supraclavicular regions.  Lungs: clear to auscultation bilaterally with good excursion.  CV: regular rate and rhythm.  Abdomen: soft, nontender, No CVAT  Ext: no edema, color normal, vascularity normal, temperature normal    Assessment and Plan:   The following treatment plan was discussed    1. Impaired fasting glucose      Continue with use of butterbur , discussed with patient of the possible signs of termporal arteritis and should the curent symptoms change that she should seek    2. Heart palpitations      Continnue with cardiology follow up    3. Acute midline low back pain without sciatica      Patient is advised to use acupuncture while watch for temperal arteritis " warning signs.       Followup: Return in about 3 months (around 11/29/2019), or if symptoms worsen or fail to improve.    We also reviewed PMH, family history, and each of her chronic diagnoses in regards to current management, specialty physicians, symptom control, and future planning. Please refer to assessment and plan/discussion/recommendations for additional details.        Please note that dictation has been dictated using voice recognition soft ware. I have made every reasonable attempt to correct obvious errors, but I expect that there are errors of grammar and possibly content that I did not discover before finalizing the note.

## 2019-08-31 PROBLEM — J32.1 CHRONIC FRONTAL SINUSITIS: Status: RESOLVED | Noted: 2019-07-16 | Resolved: 2019-08-31

## 2019-08-31 NOTE — ASSESSMENT & PLAN NOTE
Her typical pain has been more significant with the neck and behind the eye that seems to have movement to right temporal too but without palpation tenderness and vision changes besides transient blinder sensation that was exame=ined by her eye doctor and continued with follow up

## 2019-09-04 ENCOUNTER — OFFICE VISIT (OUTPATIENT)
Dept: MEDICAL GROUP | Facility: IMAGING CENTER | Age: 74
End: 2019-09-04
Payer: MEDICARE

## 2019-09-04 VITALS
HEIGHT: 60 IN | RESPIRATION RATE: 12 BRPM | HEART RATE: 78 BPM | SYSTOLIC BLOOD PRESSURE: 122 MMHG | BODY MASS INDEX: 22.97 KG/M2 | TEMPERATURE: 97.9 F | DIASTOLIC BLOOD PRESSURE: 72 MMHG | WEIGHT: 117 LBS | OXYGEN SATURATION: 98 %

## 2019-09-04 DIAGNOSIS — I15.9 SECONDARY HYPERTENSION: ICD-10-CM

## 2019-09-04 DIAGNOSIS — R73.01 IMPAIRED FASTING GLUCOSE: ICD-10-CM

## 2019-09-04 DIAGNOSIS — R00.2 HEART PALPITATIONS: ICD-10-CM

## 2019-09-04 DIAGNOSIS — M54.50 ACUTE MIDLINE LOW BACK PAIN WITHOUT SCIATICA: ICD-10-CM

## 2019-09-04 DIAGNOSIS — E78.5 DYSLIPIDEMIA: ICD-10-CM

## 2019-09-04 DIAGNOSIS — M85.89 OSTEOPENIA OF MULTIPLE SITES: ICD-10-CM

## 2019-09-04 NOTE — PROGRESS NOTES
"Chief Complaint   Patient presents with   • Medication Management     Subjective:   Radha Kerr is a 74 y.o. female here today for multiple problems as listed below.      No problem-specific Assessment & Plan notes found for this encounter.     Current medicines (including changes today)  Current Outpatient Medications   Medication Sig Dispense Refill   • diphenhydrAMINE (BENADRYL) 25 MG Tab Take 25 mg by mouth every 6 hours as needed for Sleep.     • hydrOXYzine pamoate (VISTARIL) 25 MG Cap Take 1 Cap by mouth 3 times a day as needed for Other (May cause sedation). 30 Cap 0   • WHITE MULBERRY SC Inject  as instructed.     • Calcium-Magnesium-Vitamin D (CALCIUM MAGNESIUM PO) Take  by mouth every day.     • ibuprofen (MOTRIN) 200 MG Tab Take 200 mg by mouth every 6 hours as needed.     • raNITidine (ZANTAC) 150 MG Tab Take 150 mg by mouth 2 times a day.     • Non Formulary Request Strontium, MWF     • D-MANNOSE PO Take 500 mg by mouth 2 Times a Day.     • aspirin (ASA) 81 MG Chew Tab chewable tablet Take 81 mg by mouth every day.     • Acetaminophen (TYLENOL PO) Take  by mouth.     • latanoprost (XALATAN) 0.005 % SOLN Place 1 Drop in both eyes every evening. Lt eye daily      • mag hydrox-al hydrox-simeth-diphenhydrAMINE-lidocaine Swish, gargle, and spit, one to two teaspoonfuls every six hours as needed. Shake well before using. (Patient not taking: Reported on 8/29/2019) 120 mL 0     No current facility-administered medications for this visit.      She  has a past medical history of Anesthesia, Arthritis, CATARACT, Chronic frontal sinusitis (7/16/2019), GERD (gastroesophageal reflux disease), Glaucoma, Hyperlipidemia, and Urinary bladder disorder.    ROS  No chest pain, no shortness of breath, no abdominal pain, no diarrhea/constipation, no urinary symptoms.     Objective:     /72   Pulse 78   Temp 36.6 °C (97.9 °F)   Resp 12   Ht 1.511 m (4' 11.5\")   Wt 53.1 kg (117 lb)   SpO2 98%  Body mass index is " 23.24 kg/m².  Physical Exam:  Alert, oriented in no acute distress.  Eye contact is good, speech goal directed, affect calm  HEENT: conjunctiva non-injected, sclera non-icteric.  Pinna normal. TM pearly gray. Oral mucous membranes pink and moist with no lesions.  Neck: No adenopathy or masses in the neck or supraclavicular regions.  Lungs: clear to auscultation bilaterally with good excursion.  CV: regular rate and rhythm.  Abdomen: soft, nontender, No CVAT  Ext: no edema, color normal, vascularity normal, temperature normal    Assessment and Plan:   The following treatment plan was discussed     1. Secondary hypertension     2. Dyslipidemia     3. Osteopenia of multiple sites     4. Impaired fasting glucose     5. Heart palpitations     6. Acute midline low back pain without sciatica     White [place] Leaf 1000 mg daily.  Vinporcitin 10 mg daily for the next 2 weeks when you experienced brain fog    Followup: No follow-ups on file.     We also reviewed PMH, family history, and each of her chronic diagnoses in regards to current management, specialty physicians, symptom control, and future planning. Please refer to assessment and plan/discussion/recommendations for additional details.        Please note that dictation has been dictated using voice recognition soft ware. I have made every reasonable attempt to correct obvious errors, but I expect that there are errors of grammar and possibly content that I did not discover before finalizing the note.

## 2019-09-05 NOTE — PATIENT INSTRUCTIONS
White [place] Leaf 1000 mg daily.  Vinporcitin 10 mg daily for the next 2 weeks when you experienced brain fog

## 2019-09-18 ENCOUNTER — APPOINTMENT (RX ONLY)
Dept: URBAN - METROPOLITAN AREA CLINIC 20 | Facility: CLINIC | Age: 74
Setting detail: DERMATOLOGY
End: 2019-09-18

## 2019-09-18 DIAGNOSIS — L82.1 OTHER SEBORRHEIC KERATOSIS: ICD-10-CM

## 2019-09-18 DIAGNOSIS — T88.7XX: ICD-10-CM | Status: RESOLVING

## 2019-09-18 PROBLEM — T88.7XXA UNSPECIFIED ADVERSE EFFECT OF DRUG OR MEDICAMENT, INITIAL ENCOUNTER: Status: ACTIVE | Noted: 2019-09-18

## 2019-09-18 PROCEDURE — ? COUNSELING

## 2019-09-18 PROCEDURE — 99213 OFFICE O/P EST LOW 20 MIN: CPT

## 2019-09-18 PROCEDURE — ? ADDITIONAL NOTES

## 2019-09-18 ASSESSMENT — LOCATION DETAILED DESCRIPTION DERM
LOCATION DETAILED: PERIUMBILICAL SKIN
LOCATION DETAILED: RIGHT PROXIMAL PRETIBIAL REGION
LOCATION DETAILED: LEFT INFERIOR CENTRAL MALAR CHEEK
LOCATION DETAILED: SUPERIOR LUMBAR SPINE
LOCATION DETAILED: LEFT PROXIMAL PRETIBIAL REGION

## 2019-09-18 ASSESSMENT — LOCATION ZONE DERM
LOCATION ZONE: FACE
LOCATION ZONE: LEG
LOCATION ZONE: TRUNK

## 2019-09-18 ASSESSMENT — LOCATION SIMPLE DESCRIPTION DERM
LOCATION SIMPLE: LEFT PRETIBIAL REGION
LOCATION SIMPLE: LEFT CHEEK
LOCATION SIMPLE: RIGHT PRETIBIAL REGION
LOCATION SIMPLE: ABDOMEN
LOCATION SIMPLE: LOWER BACK

## 2019-09-18 NOTE — PROCEDURE: COUNSELING
Patient Specific Counseling (Will Not Stick From Patient To Patient): Due to metoprolol.
Detail Level: Detailed

## 2019-10-01 ENCOUNTER — APPOINTMENT (OUTPATIENT)
Dept: RADIOLOGY | Facility: MEDICAL CENTER | Age: 74
End: 2019-10-01
Attending: EMERGENCY MEDICINE
Payer: COMMERCIAL

## 2019-10-01 ENCOUNTER — HOSPITAL ENCOUNTER (EMERGENCY)
Facility: MEDICAL CENTER | Age: 74
End: 2019-10-01
Attending: EMERGENCY MEDICINE
Payer: COMMERCIAL

## 2019-10-01 VITALS
TEMPERATURE: 98.1 F | DIASTOLIC BLOOD PRESSURE: 80 MMHG | RESPIRATION RATE: 16 BRPM | OXYGEN SATURATION: 96 % | SYSTOLIC BLOOD PRESSURE: 138 MMHG | HEART RATE: 80 BPM

## 2019-10-01 DIAGNOSIS — S06.9X0A MILD TRAUMATIC BRAIN INJURY, WITHOUT LOSS OF CONSCIOUSNESS, INITIAL ENCOUNTER (HCC): ICD-10-CM

## 2019-10-01 LAB — EKG IMPRESSION: NORMAL

## 2019-10-01 PROCEDURE — 70450 CT HEAD/BRAIN W/O DYE: CPT

## 2019-10-01 PROCEDURE — 99284 EMERGENCY DEPT VISIT MOD MDM: CPT

## 2019-10-01 NOTE — LETTER
FORM C-4:  EMPLOYEE’S CLAIM FOR COMPENSATION/ REPORT OF INITIAL TREATMENT  EMPLOYEE’S CLAIM - PROVIDE ALL INFORMATION REQUESTED   First Name  Radha Last Name  Cirilo Birthdate             Age  1945 74 y.o. 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 Be specific and answer in detail. Use additional sheet if necessary)  Stood on stool to grab a box off a shelf, the stool moved causing me to slip off and hit my head against a shelf   If you believe that you have an occupational disease, when did you first have knowledge of the disability and it relationship to your employment?  n/a Witnesses to the Accident  N/A     Nature of Injury or Occupational Disease  Workers' Compensation  Part(s) of Body Injured or Affected  Skull, Soft Tissue, N/A    I certify that the above is true and correct to the best of my knowledge and that I have provided this information in order to obtain the benefits of  [place] or [place]).  I hereby authorize any physician, chiropractor, surgeon, practitioner, or other person, any hospital, including [facility] or [facility], any medical service organization, any insurance company, or other institution or organization to release to each other, any medical or other information, including benefits paid or payable, pertinent to this injury or disease, except information relative to diagnosis, treatment and/or counseling for AIDS, psychological conditions, alcohol or controlled substances, for which I must give specific authorization.  A Photostat of this authorization shall be as valid as the original.   Date  10/09/2019 [facility] Employee’s Signature   THIS REPORT MUST BE COMPLETED AND MAILED WITHIN 3 WORKING DAYS OF TREATMENT   Place  [facility], EMERGENCY DEPT  Name of Facility   [facility]   Date  10/1/2019 Diagnosis  (S06.9X0A) Mild traumatic brain injury, without loss of consciousness, initial encounter ([facility]) Is there evidence the injured employee was under the influence of alcohol and/or another controlled substance at the time of accident?   Hour  4:45 PM Description of Injury or Disease  Mild traumatic brain injury, without loss of consciousness, initial encounter (H[facility]) No   Treatment  tylenol  Have you advised the patient to remain off work five days or more?         No   X-Ray Findings  Negative  Comments:negative head CT for trauma   If Yes   From Date    To Date      From information given by the employee, together with medical evidence, can you directly connect this injury or occupational disease as job incurred?  Yes If No, is the employee capable of: Full Duty  Yes Modified Duty      Is additional medical care by a physician indicated?  Yes If Modified Duty, Specify any Limitations /  "Restrictions        Do you know of any previous injury or disease contributing to this condition or occupational disease?  No   Date  10/9/2019 Print Doctor’s Name  Joe Márquez certify the employer’s copy of this form was mailed on:   Address  [address] [address] [phone] Insurer’s Use Only   [facility]  [id number]    Provider’s Tax ID Number  [id number] Telephone  Dept: [phone]    Doctor’s Signature  ciera-JOE Vogt M.D. Degree   M.D.    Original - TREATING PHYSICIAN OR CHIROPRACTOR   Pg 2-Insurer/TPA   Pg 3-Employer   Pg 4-Employee                                                                                                  Form C-4 (rev01/03)   BRIEF DESCRIPTION OF RIGHTS AND BENEFITS  (Pursuant to [place] 616C.050)  Notice of Injury or Occupational Disease (Incident Report Form C-1): If an injury or occupational disease (OD) arises out of and in the course of employment, you must provide written notice to your employer as soon as practicable, but no later than 7 days after the accident or OD. Your employer shall maintain a sufficient supply of the required forms.  Claim for Compensation (Form C-4): If medical treatment is sought, the form C-4 is available at the place of initial treatment. A completed \"Claim for Compensation\" (Form C-4) must be filed within 90 days after an accident or OD. The treating physician or chiropractor must, within 3 working days after treatment, complete and mail to the employer, the employer's insurer and third-party , the Claim for Compensation.  Medical Treatment: If you require medical treatment for your on-the-job injury or OD, you may be required to select a physician or chiropractor from a list provided by your workers’ compensation insurer, if it has contracted with an Organization for Managed Care (MCO) or Preferred Provider Organization (PPO) or providers of health care. If your employer has not entered into a " contract with an MCO or PPO, you may select a physician or chiropractor from the Panel of Physicians and Chiropractors. Any medical costs related to your industrial injury or OD will be paid by your insurer.  Temporary Total Disability (TTD): If your doctor has certified that you are unable to work for a period of at least 5 consecutive days, or 5 cumulative days in a 20-day period, or places restrictions on you that your employer does not accommodate, you may be entitled to TTD compensation.  Temporary Partial Disability (TPD): If the wage you receive upon reemployment is less than the compensation for TTD to which you are entitled, the insurer may be required to pay you TPD compensation to make up the difference. TPD can only be paid for a maximum of 24 months.  Permanent Partial Disability (PPD): When your medical condition is stable and there is an indication of a PPD as a result of your injury or OD, within 30 days, your insurer must arrange for an evaluation by a rating physician or chiropractor to determine the degree of your PPD. The amount of your PPD award depends on the date of injury, the results of the PPD evaluation and your age and wage.  Permanent Total Disability (PTD): If you are medically certified by a treating physician or chiropractor as permanently and totally disabled and have been granted a PTD status by your insurer, you are entitled to receive monthly benefits not to exceed 66 2/3% of your average monthly wage. The amount of your PTD payments is subject to reduction if you previously received a PPD award.  Vocational Rehabilitation Services: You may be eligible for vocational rehabilitation services if you are unable to return to the job due to a permanent physical impairment or permanent restrictions as a result of your injury or occupational disease.  Transportation and Per [place] Reimbursement: You may be eligible for travel expenses and per salvador associated with medical  treatment.  Reopening: You may be able to reopen your claim if your condition worsens after claim closure.  Appeal Process: If you disagree with a written determination issued by the insurer or the insurer does not respond to your request, you may appeal to the Department of Administration, , by following the instructions contained in your determination letter. You must appeal the determination within 70 days from the date of the determination letter at [address], [address], [place], [place] [address], or [address], [address], [place], [place] [address]. If you disagree with the  decision, you may appeal to the Department of Administration, . You must file your appeal within 30 days from the date of the  decision letter at [address], [address], [place], [place] [address], or [address], [place], [place] [address]. If you disagree with a decision of an , you may file a petition for judicial review with the District Court. You must do so within 30 days of the Appeal Officer’s decision. You may be represented by an  at your own expense or you may contact the [facility] for possible representation.  [place]  for Injured Workers (NAIW): If you disagree with a  decision, you may request that NAIW represent you without charge at an  Hearing. For information regarding denial of benefits, you may contact the [facility] at: [address], [address], [place] [address], [phone], or [address], [address], [place] [address], [phone]  To File a Complaint with the Division: If you wish to file a complaint with the  of the Division of Industrial Relations (DIR), please contact the Workers’ Compensation Section, [address], [address], [place], [place] [address], telephone [phone], or [address], [address], [place], [place] [address], telephone [phone].  For assistance with Workers’ Compensation Issues: you may contact the Office of the Governor Consumer Health Assistance, [address] [place] [address], [address], [place], [place] [address], Toll Free 1-335.830.3747, Web site: http://[employer].[facility].nv., E-mail [email or web address].                                                                                                                                                                               __________________________________________________________________                                    10/09/2019            Employee Name / Signature                                                                                                                            Date                                       D-2 (rev. 10/07)

## 2019-10-01 NOTE — ED NOTES
Assumed care of pt at this time. Educated on POC. Siderails up x 2 and call light within reach. Pt denies other needs at this time.

## 2019-10-01 NOTE — ED NOTES
Radha Kerr discharged via ambualtion with  driving home.  Discharge instructions given and reviewed, patient educated to follow up with PCP, verbalized understanding.  All personal belongings in possession.  No questions at this time.

## 2019-10-01 NOTE — ED TRIAGE NOTES
Patient arrives from the [place] after slipping out of step stool. She reports striking her head on wooden shelf next to stool. Patient denies LOC. Reports mild headache. Ambulatory to room with [facility].

## 2019-10-01 NOTE — ED PROVIDER NOTES
"ED Provider Note    CHIEF COMPLAINT  Chief Complaint   Patient presents with   • Fall     Patient was sitting on step stool and reaching for box when step stool slid out from under her and she fell back and struck head on step stool. Fell onto bottom, less than 2 feet. No LOC. Patient takes ASA       HPI  Radha Kerr is a 74 y.o. female who presents by ambulance after a fall at work at the [employer] [facility] [place].  She was sitting on a stepladder stool when it slid out from under her and she fell onto the floor landing on her buttock and striking the right occiput on a wooden drawer edge.  No loss of consciousness.  No headache now but she had \"a zapping-like sensation\" in her head.  denies anticoagulant use but does take daily aspirin.  No loss of consciousness forgetfulness or dazed.  After the injury.  Denies other injury.  No neck pain.    REVIEW OF SYSTEMS  Pertinent positives include: Fall, head injury, right buttock soreness.  Pertinent negatives include: Scalp wound, hematoma, neck pain, back pain, chest pain, abdominal pain, hip pain, extremity pain, weakness, numbness.  10+ systems reviewed and negative.      PAST MEDICAL HISTORY  Past Medical History:   Diagnosis Date   • Anesthesia     nausea   • Arthritis    • CATARACT     early stages   • Chronic frontal sinusitis 7/16/2019   • GERD (gastroesophageal reflux disease)    • Glaucoma     left eye   • Hyperlipidemia    • Urinary bladder disorder        FAMILY HISTORY  Family History   Problem Relation Age of Onset   • Heart Disease Mother         chf   • Cancer Father    • Stroke Father 70   • Diabetes Father        SOCIAL HISTORY  Social History     Tobacco Use   • Smoking status: Never Smoker   • Smokeless tobacco: Never Used   Substance Use Topics   • Alcohol use: No     Alcohol/week: 0.0 oz   • Drug use: No     Social History     Substance and Sexual Activity   Drug Use No       SURGICAL HISTORY  Past Surgical History:   Procedure Laterality Date   • " COLONOSCOPY  12/17/13    2 polyps   • KNEE ARTHROSCOPY  1/9/2013    Performed by Seun Giles M.D. at SURGERY [facility] ORS   • MEDIAL MENISCECTOMY  1/9/2013    Performed by Seun Giles M.D. at SURGERY [facility] ORS   • KNEE ARTHROSCOPY  7/13/2010    Performed by HAL WRIGHT at SURGERY SAME DAY [facility] ORS   • MEDIAL MENISCECTOMY  7/13/2010    Performed by HAL WRIGHT at SURGERY SAME DAY [facility] ORS   • ABDOMINAL HYSTERECTOMY TOTAL      gall bladder       CURRENT MEDICATIONS  No current facility-administered medications for this encounter.      Current Outpatient Medications   Medication Sig Dispense Refill   • diphenhydrAMINE (BENADRYL) 25 MG Tab Take 25 mg by mouth every 6 hours as needed for Sleep.     • mag hydrox-al hydrox-simeth-diphenhydrAMINE-lidocaine Swish, gargle, and spit, one to two teaspoonfuls every six hours as needed. Shake well before using. (Patient not taking: Reported on 8/29/2019) 120 mL 0   • hydrOXYzine pamoate (VISTARIL) 25 MG Cap Take 1 Cap by mouth 3 times a day as needed for Other (May cause sedation). 30 Cap 0   • WHITE MULBERRY SC Inject  as instructed.     • Calcium-Magnesium-Vitamin D (CALCIUM MAGNESIUM PO) Take  by mouth every day.     • ibuprofen (MOTRIN) 200 MG Tab Take 200 mg by mouth every 6 hours as needed.     • raNITidine (ZANTAC) 150 MG Tab Take 150 mg by mouth 2 times a day.     • Non Formulary Request Strontium, MWF     • D-MANNOSE PO Take 500 mg by mouth 2 Times a Day.     • aspirin (ASA) 81 MG Chew Tab chewable tablet Take 81 mg by mouth every day.     • Acetaminophen (TYLENOL PO) Take  by mouth.     • latanoprost (XALATAN) 0.005 % SOLN Place 1 Drop in both eyes every evening. Lt eye daily          ALLERGIES  Allergies   Allergen Reactions   • Metronidazole Unspecified     Rapid heart beat and chest pressure.    • Azithromycin      rash   • Ciprofloxacin    • Diclofenac    • Doxycycline    • Methylprednisolone    • Myrbetriq [Mirabegron]    • Other  Drug      Anticolinergic   • Pcn [Penicillins]        PHYSICAL EXAM  VITAL SIGNS: /78   Pulse 83   Temp 36.7 °C (98.1 °F) (Temporal)   Resp 15   SpO2 96%   Reviewed and elevated blood pressure  Constitutional: Well developed, Well nourished, very well-appearing, appears younger than age.  HENT: Normocephalic, atraumatic, bilateral external ears normal, oropharynx moist, No exudates or erythema.  Mild tender spot on right occipital parietal scalp without erythema hematoma or wound.  No hemotympanum or CSF leaks.  Face symmetric  Eyes: PERRLA active, conjunctiva pink, no scleral icterus.  Extraocular movements intact  Respiratory: No chest tenderness.  Gastrointestinal: Soft nontender nondistended.  Skin: No erythema, no rash.  No wounds, no ecchymoses  Genitourinary:  No costovertebral angle tenderness.   Neurologic: GCS 15.  Alert & oriented x 3, cranial nerves 2-12 intact grasp, biceps, extensor hallucis longus and ankle plantarflexion symmetric.  Finger-nose-finger and fine motor without dysmetria..  No focal deficit noted.  Psychiatric: Affect normal, Judgment normal, Mood normal.   Skeletal: Spine nontender, pelvis stable, hip rotation normal, arms and legs range without discomfort    DIFFERENTIAL DIAGNOSIS:  Mild traumatic brain injury, concussion, skull fracture, intracranial hemorrhage, buttock contusion.      RADIOLOGY/PROCEDURES  CT-HEAD W/O   Final Result      1.  No CT evidence of acute infarct, hemorrhage or mass. No acute intracranial injury.   2.  Mild global parenchymal atrophy. Chronic small vessel ischemic changes.            COURSE & MEDICAL DECISION MAKING  Well-appearing patient presents with a mild traumatic brain injury without evidence of skull fracture, intracranial hemorrhage or significant concussion.  She is on no anticoagulants.  She has a contusion to the right buttock.  There is no evidence of other injury to spine, torso or extremities..    This patient has borderline or  elevated blood pressure as recorded above and was instructed to followup with primary physician for comprehensive blood pressure evaluation and yearly fasting cholesterol assessment according to to CMS protocol.    PLAN:  Tylenol for pain  Worker's Compensation form completed    Mild traumatic brain injury handout given  Return for severe worsening headache, uncontrolled vomiting, odd behavior, difficulty arousing from sleep    [place] [id number] [facility] [address]  [place] [address]  [phone]  Schedule an appointment as soon as possible for a visit   As needed if you worsen      CONDITION: Stable.    FINAL IMPRESSION  1. Mild traumatic brain injury, without loss of consciousness, initial encounter ([facility])    2.      Buttock contusion  3.      Fall      Electronically signed by: Malcolm Márquez, 10/1/2019 1:02 PM

## 2019-10-01 NOTE — DISCHARGE INSTRUCTIONS
Take Tylenol as needed for headache.  Return for worsening or severe headache, uncontrolled vomiting, confusion, difficulty arousing from sleep.  It is very unlikely any of this would develop.    You had a borderline or high normal blood pressure reading today.  This does not necessarily mean you have hypertension.  Please followup with your/a primary physician for comprehensive blood pressure evaluation and yearly fasting cholesterol assessment.  BP Readings from Last 3 Encounters:   10/01/19 146/78   09/04/19 122/72   08/29/19 132/84

## 2019-10-01 NOTE — LETTER
FORM C-4:  EMPLOYEE’S CLAIM FOR COMPENSATION/ REPORT OF INITIAL TREATMENT  EMPLOYEE’S CLAIM - PROVIDE ALL INFORMATION REQUESTED   First Name  Manrey Last Name  Cirilo Birthdate             Age  1945 74 y.o. 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 (if applicable)      How did this injury or occupational disease occur? Be specific and answer in detail. Use additional sheet if necessary)     If you believe that you have an occupational disease, when did you first have knowledge of the disability and it relationship to your employment?   Witnesses to the Accident       Nature of Injury or Occupational Disease    Part(s) of Body Injured or Affected  , ,     I certify that the above is true and correct to the best of my knowledge and that I have provided this information in order to obtain the benefits of [place]).  I hereby authorize any physician, chiropractor, surgeon, practitioner, or other person, any hospital, including [facility], any medical service organization, any insurance company, or other institution or organization to release to each other, any medical or other information, including benefits paid or payable, pertinent to this injury or disease, except information relative to diagnosis, treatment and/or counseling for AIDS, psychological conditions, alcohol or controlled substances, for which I must give specific authorization.  A Photostat of this authorization shall be as valid as the original.   Date Place   Employee’s Signature   THIS REPORT MUST BE COMPLETED AND MAILED WITHIN 3 WORKING DAYS OF TREATMENT   Place  [facility], EMERGENCY DEPT  Name of Facility   [facility]   Date  10/1/2019 Diagnosis  (S06.9X0A) Mild traumatic brain injury, without loss of consciousness, initial encounter (HCC) Is there evidence the injured employee was under the influence of alcohol and/or another controlled substance at the time of accident?   Hour  4:38 PM Description of Injury or Disease  Mild traumatic brain injury, without loss of consciousness, initial encounter (HCC) No   Treatment  tylenol  Have you advised the patient to remain off work five days or more?         No   X-Ray Findings  Negative  Comments:negative head CT for trauma   If Yes   From Date    To Date      From information given by the employee, together with medical evidence, can you directly connect this injury or occupational disease as job incurred?  Yes If No, is the employee capable of: Full Duty  Yes Modified Duty      Is additional medical care by a physician indicated?  Yes If Modified Duty, Specify any Limitations / Restrictions        Do you know of any previous injury or disease contributing to this condition or occupational disease?  No   Date  10/9/2019 Print Doctor’s Name  Malcolm Márquez I certify the employer’s copy of this form was mailed on:   Address  [address] [place] [id number] [phone]  "Insurer’s Use Only   [facility] Zip  [address]    Provider’s Tax ID Number  [id number] Telephone  Dept: [phone]    Doctor’s Signature  e-JOE Vogt M.D. Degree       Original - TREATING PHYSICIAN OR CHIROPRACTOR   Pg 2-Insurer/TPA   Pg 3-Employer   Pg 4-Employee                                                                                                  Form C-4 (rev01/03)     BRIEF DESCRIPTION OF RIGHTS AND BENEFITS  (Pursuant to [place] 616C.050)    Notice of Injury or Occupational Disease (Incident Report Form C-1): If an injury or occupational disease (OD) arises out of and in the course of employment, you must provide written notice to your employer as soon as practicable, but no later than 7 days after the accident or OD. Your employer shall maintain a sufficient supply of the required forms.    Claim for Compensation (Form C-4): If medical treatment is sought, the form C-4 is available at the place of initial treatment. A completed \"Claim for Compensation\" (Form C-4) must be filed within 90 days after an accident or OD. The treating physician or chiropractor must, within 3 working days after treatment, complete and mail to the employer, the employer's insurer and third-party , the Claim for Compensation.    Medical Treatment: If you require medical treatment for your on-the-job injury or OD, you may be required to select a physician or chiropractor from a list provided by your workers’ compensation insurer, if it has contracted with an Organization for Managed Care (MCO) or Preferred Provider Organization (PPO) or providers of health care. If your employer has not entered into a contract with an MCO or PPO, you may select a physician or chiropractor from the Panel of Physicians and Chiropractors. Any medical costs related to your industrial injury or OD will be paid by your insurer.    Temporary Total Disability (TTD): If your doctor has certified that you are unable to " work for a period of at least 5 consecutive days, or 5 cumulative days in a 20-day period, or places restrictions on you that your employer does not accommodate, you may be entitled to TTD compensation.    Temporary Partial Disability (TPD): If the wage you receive upon reemployment is less than the compensation for TTD to which you are entitled, the insurer may be required to pay you TPD compensation to make up the difference. TPD can only be paid for a maximum of 24 months.    Permanent Partial Disability (PPD): When your medical condition is stable and there is an indication of a PPD as a result of your injury or OD, within 30 days, your insurer must arrange for an evaluation by a rating physician or chiropractor to determine the degree of your PPD. The amount of your PPD award depends on the date of injury, the results of the PPD evaluation and your age and wage.    Permanent Total Disability (PTD): If you are medically certified by a treating physician or chiropractor as permanently and totally disabled and have been granted a PTD status by your insurer, you are entitled to receive monthly benefits not to exceed 66 2/3% of your average monthly wage. The amount of your PTD payments is subject to reduction if you previously received a PPD award.    Vocational Rehabilitation Services: You may be eligible for vocational rehabilitation services if you are unable to return to the job due to a permanent physical impairment or permanent restrictions as a result of your injury or occupational disease.    Transportation and Per [place] Reimbursement: You may be eligible for travel expenses and per salvador associated with medical treatment.  Reopening: You may be able to reopen your claim if your condition worsens after claim closure.    Appeal Process: If you disagree with a written determination issued by the insurer or the insurer does not respond to your request, you may appeal to the Department of Administration, Hearing  Officer, by following the instructions contained in your determination letter. You must appeal the determination within 70 days from the date of the determination letter at [address], [address], [place], [place] [address], or [address], [address], [place], [place] [address]. If you disagree with the  decision, you may appeal to the Department of Administration, . You must file your appeal within 30 days from the date of the  decision letter at [address], [address], [place], [place] [address], or [address], [address], [place], [place] [address]. If you disagree with a decision of an , you may file a petition for judicial review with the District Court. You must do so within 30 days of the Appeal Officer’s decision. You may be represented by an  at your own expense or you may contact the [facility] for possible representation.    [place]  for Injured Workers (NAIW): If you disagree with a  decision, you may request that NAIW represent you without charge at an  Hearing. For information regarding denial of benefits, you may contact the [facility] at: [address], [address], [place] [address], [phone], or [address], [place] [address], [phone]    To File a Complaint with the Division: If you wish to file a complaint with the  of the Division of Industrial Relations (DIR), please contact the Workers’ Compensation Section, [address], [address], [place], [place] [address], telephone [phone], or [address], [place] [address], telephone [phone].    For assistance with Workers’ Compensation Issues: you may contact the Office of the [facility] Consumer Health Assistance, [address], [place], [place] [address], Toll Free 1-674.851.9394, Web site:  http://del..nv.us, E-mail janie@.nv.                                                                                                                                                                               __________________________________________________________________                                    _________________            Employee Name / Signature                                                                                                                            Date                                       D-2 (rev. 10/07)

## 2019-10-09 ENCOUNTER — TELEPHONE (OUTPATIENT)
Dept: MEDICAL GROUP | Facility: MEDICAL CENTER | Age: 74
End: 2019-10-09

## 2019-10-09 NOTE — TELEPHONE ENCOUNTER
ESTABLISHED PATIENT PRE-VISIT PLANNING     Patient was NOT contacted to complete PVP.     Note: Patient will not be contacted if there is no indication to call.     1.  Reviewed notes from the last few office visits within the medical group: Yes    2.  If any orders were placed at last visit or intended to be done for this visit (i.e. 6 mos follow-up), do we have Results/Consult Notes?        •  Labs - Labs were not ordered at last office visit.   Note: If patient appointment is for lab review and patient did not complete labs, check with provider if OK to reschedule patient until labs completed.       •  Imaging - Imaging ordered, completed and results are in chart.       •  Referrals - No referrals were ordered at last office visit.    3. Is this appointment scheduled as a Hospital Follow-Up? Yes, visit was at [facility].     4.  Immunizations were updated in Epic using WebIZ?: Epic matches WebIZ       •  Web Iz Recommendations: FLU, VARICELLA (Chicken Pox)  and SHINGRIX (Shingles)    5.  Patient is due for the following Health Maintenance Topics:   Health Maintenance Due   Topic Date Due   • Annual Wellness Visit  1945   • HEPATITIS C SCREENING  1945   • IMM ZOSTER VACCINES (2 of 3) 11/18/2014   • COLONOSCOPY  12/17/2018   • IMM INFLUENZA (1) 09/01/2019       6. Orders for overdue Health Maintenance topics pended in Pre-Charting? NO    7.  AHA (MDX) form printed for Provider? No, already completed    8.  Patient was NOT informed to arrive 15 min prior to their scheduled appointment and bring in their medication bottles.

## 2019-10-15 ENCOUNTER — OFFICE VISIT (OUTPATIENT)
Dept: MEDICAL GROUP | Facility: MEDICAL CENTER | Age: 74
End: 2019-10-15
Payer: MEDICARE

## 2019-10-15 VITALS
WEIGHT: 120.4 LBS | OXYGEN SATURATION: 96 % | HEIGHT: 59 IN | DIASTOLIC BLOOD PRESSURE: 70 MMHG | HEART RATE: 80 BPM | BODY MASS INDEX: 24.27 KG/M2 | TEMPERATURE: 98.6 F | RESPIRATION RATE: 16 BRPM | SYSTOLIC BLOOD PRESSURE: 120 MMHG

## 2019-10-15 DIAGNOSIS — R51.9 NONINTRACTABLE HEADACHE, UNSPECIFIED CHRONICITY PATTERN, UNSPECIFIED HEADACHE TYPE: ICD-10-CM

## 2019-10-15 DIAGNOSIS — Z00.00 HEALTHCARE MAINTENANCE: ICD-10-CM

## 2019-10-15 DIAGNOSIS — R00.2 HEART PALPITATIONS: ICD-10-CM

## 2019-10-15 DIAGNOSIS — R74.8 ELEVATED LIPASE: ICD-10-CM

## 2019-10-15 DIAGNOSIS — E78.5 DYSLIPIDEMIA: ICD-10-CM

## 2019-10-15 DIAGNOSIS — D72.829 LEUKOCYTOSIS, UNSPECIFIED TYPE: ICD-10-CM

## 2019-10-15 DIAGNOSIS — R73.01 IMPAIRED FASTING GLUCOSE: ICD-10-CM

## 2019-10-15 DIAGNOSIS — M85.89 OSTEOPENIA OF MULTIPLE SITES: ICD-10-CM

## 2019-10-15 DIAGNOSIS — Z72.89 OTHER PROBLEMS RELATED TO LIFESTYLE: ICD-10-CM

## 2019-10-15 DIAGNOSIS — Z23 NEED FOR VACCINATION: ICD-10-CM

## 2019-10-15 PROCEDURE — 90662 IIV NO PRSV INCREASED AG IM: CPT | Performed by: FAMILY MEDICINE

## 2019-10-15 PROCEDURE — G0008 ADMIN INFLUENZA VIRUS VAC: HCPCS | Performed by: FAMILY MEDICINE

## 2019-10-15 PROCEDURE — 99214 OFFICE O/P EST MOD 30 MIN: CPT | Mod: 25 | Performed by: FAMILY MEDICINE

## 2019-10-15 RX ORDER — VINPOCETINE 100 %
POWDER (GRAM) MISCELLANEOUS
COMMUNITY
End: 2020-04-15

## 2019-10-15 NOTE — ASSESSMENT & PLAN NOTE
10 1 the patient was seen in the ER after sustaining a fall.  She hit the back of her head.  Since then, she has had a very low level headache which is progressively improving.  Patient did get a CT scan of her head 2 weeks ago which was reassuring.

## 2019-10-15 NOTE — PROGRESS NOTES
[facility]  Progress Note  Established Patient    Subjective:   Radha Kerr is a 74 y.o. female here today with a chief complaint of IFG. The patient is accompanied by her .     Healthcare maintenance  Lipids: done 2019.   Fasting Glucose: done 2019.   Hepatitis C Screen: ordered.  Colonoscopy: records requested.   Mammogram: given 12/2018 and normal.   Pap: done by Dr. Pittman (gynecology).   DEXA: done 2014.     Flu vaccine: given 10/15/19.   Pneumonia vaccine: Prevnar given 2016, Pneumovax given 2012.   Tdap: given 2012.     Dyslipidemia  Noted on past labs.     Heart palpitations  Patient is under the care of cardiology for palpitations.  She brings an EKG today showing premature beats.  She was unable to tolerate metoprolol.    Impaired fasting glucose  Noted on past labs.    Leukocytosis  Noted on past labs.     Elevated lipase  Noted on past labs.    Osteopenia of multiple sites  Patient has osteopenia identified on past DEXA scan.  The FRAX risk was not elevated.  The patient is already on calcium and vitamin D.    Headache  10 1 the patient was seen in the ER after sustaining a fall.  She hit the back of her head.  Since then, she has had a very low level headache which is progressively improving.  Patient did get a CT scan of her head 2 weeks ago which was reassuring.      Current Outpatient Medications on File Prior to Visit   Medication Sig Dispense Refill   • Vinpocetine Powder by Does not apply route.     • diphenhydrAMINE (BENADRYL) 25 MG Tab Take 25 mg by mouth every 6 hours as needed for Sleep.     • WHITE MULBERRY SC Inject  as instructed.     • Calcium-Magnesium-Vitamin D (CALCIUM MAGNESIUM PO) Take  by mouth every day.     • ibuprofen (MOTRIN) 200 MG Tab Take 200 mg by mouth every 6 hours as needed.     • raNITidine (ZANTAC) 150 MG Tab Take 150 mg by mouth 2 times a day.     • D-MANNOSE PO Take 500 mg by mouth 2 Times a Day.     • aspirin (ASA) 81 MG Chew Tab chewable tablet Take 81  "mg by mouth every day.     • latanoprost (XALATAN) 0.005 % SOLN Place 1 Drop in both eyes every evening. Lt eye daily        No current facility-administered medications on file prior to visit.        Past Medical History:   Diagnosis Date   • Anesthesia     nausea   • Arthritis    • CATARACT     early stages   • Chronic frontal sinusitis 7/16/2019   • GERD (gastroesophageal reflux disease)    • Glaucoma     left eye   • Hyperlipidemia    • Urinary bladder disorder        Allergies: Metronidazole; Azithromycin; Ciprofloxacin; Diclofenac; Doxycycline; Methylprednisolone; Myrbetriq [mirabegron]; Other drug; and Pcn [penicillins]    Surgical History:  has a past surgical history that includes abdominal hysterectomy total; knee arthroscopy (7/13/2010); medial meniscectomy (7/13/2010); knee arthroscopy (1/9/2013); medial meniscectomy (1/9/2013); and colonoscopy (12/17/13).    Family History: family history includes Cancer in her father; Diabetes in her father; Heart Disease in her mother; Stroke (age of onset: 70) in her father.    Social History:  reports that she has never smoked. She has never used smokeless tobacco. She reports that she does not drink alcohol or use drugs.    ROS: no CP or SOB.        Objective:     Vitals:    10/15/19 1519   BP: 120/70   BP Location: Left arm   Patient Position: Sitting   BP Cuff Size: Adult   Pulse: 80   Resp: 16   Temp: 37 °C (98.6 °F)   TempSrc: Temporal   SpO2: 96%   Weight: 54.6 kg (120 lb 6.4 oz)   Height: 1.499 m (4' 11\")       Physical Exam:  General: alert in no apparent distress.   Cardio: regular rate and rhythm, no murmurs, rubs or gallops.   Resp: CTAB no w/r/r.   Neuro: CN II - XII intact, finger to nose normal, gait normal. Strength 5/5 x 4.         Assessment and Plan:     1. Need for vaccination  - Influenza Vaccine, High Dose (65+ Only)    2. Healthcare maintenance  - see HPI.     3. Leukocytosis, unspecified type  - CBC WITH DIFFERENTIAL; Future    4. Elevated " lipase  - LIPASE; Future    5. Dyslipidemia  - Comp Metabolic Panel; Future  - Lipid Profile; Future    6. Impaired fasting glucose  - diet and exercise.   - Comp Metabolic Panel; Future    7. Other problems related to lifestyle  - HEP C VIRUS ANTIBODY; Future    8. Heart palpitations  - f/u cards.     9. Osteopenia of multiple sites  - Ca and Vit D.     10. Headache  This is likely due to a mild concussion.  I am reassured that it is improving and that the patient has already had a CT scan of her head.  I informed her to continue to take it easy and if the headache does not completely resolve within 4 weeks to let me know. Neuro exam is normal.         Followup: Return in about 6 months (around 4/15/2020), or if symptoms worsen or fail to improve, for Wellness Visit, Long.

## 2019-10-15 NOTE — ASSESSMENT & PLAN NOTE
Lipids: done 2019.   Fasting Glucose: done 2019.   Hepatitis C Screen: ordered.  Colonoscopy: records requested.   Mammogram: given 12/2018 and normal.   Pap: done by Dr. Pittman (gynecology).   DEXA: done 2014.     Flu vaccine: given 10/15/19.   Pneumonia vaccine: Prevnar given 2016, Pneumovax given 2012.   Tdap: given 2012.

## 2019-10-15 NOTE — ASSESSMENT & PLAN NOTE
Patient has osteopenia identified on past DEXA scan.  The FRAX risk was not elevated.  The patient is already on calcium and vitamin D.

## 2019-10-15 NOTE — LETTER
[employer] [facility]  Christian Green M.D.  [address]  [place] [address]  Fax: [phone]   Authorization for Release/Disclosure of   Protected Health Information   Name: RADHA KERR : 1945 SSN: [id number]   Address: [address] [place] [address]  [place] [address] Phone:    [phone] (home) [phone] (work)   I authorize the entity listed below to release/disclose the PHI below to:   [facility]/Christian Green M.D. and Christian Green M.D.   Provider or Entity Name:     Address   City, State, Zip   Phone:      Fax:     Reason for request: continuity of care   Information to be released:    [  ] LAST COLONOSCOPY,  including any PATH REPORT and follow-up  [  ] LAST FIT/COLOGUARD RESULT [  ] LAST DEXA  [  ] LAST MAMMOGRAM  [  ] LAST PAP  [  ] LAST LABS [  ] RETINA EXAM REPORT  [  ] IMMUNIZATION RECORDS  [  ] Release all info      [  ] Check here and initial the line next to each item to release ALL health information INCLUDING  _____ Care and treatment for drug and / or alcohol abuse  _____ HIV testing, infection status, or AIDS  _____ Genetic Testing    DATES OF SERVICE OR TIME PERIOD TO BE DISCLOSED: _____________  I understand and acknowledge that:  * This Authorization may be revoked at any time by you in writing, except if your health information has already been used or disclosed.  * Your health information that will be used or disclosed as a result of you signing this authorization could be re-disclosed by the recipient. If this occurs, your re-disclosed health information may no longer be protected by State or Federal laws.  * You may refuse to sign this Authorization. Your refusal will not affect your ability to obtain treatment.  * This Authorization becomes effective upon signing and will  on (date) __________.      If no date is indicated, this Authorization will  one (1) year from the signature date.    Name: Radha Kerr    Signature:   Date:     10/15/2019       PLEASE  FAX REQUESTED RECORDS BACK TO: [phone]

## 2019-10-15 NOTE — ASSESSMENT & PLAN NOTE
Patient is under the care of cardiology for palpitations.  She brings an EKG today showing premature beats.  She was unable to tolerate metoprolol.

## 2020-01-24 ENCOUNTER — HOSPITAL ENCOUNTER (OUTPATIENT)
Dept: RADIOLOGY | Facility: MEDICAL CENTER | Age: 75
End: 2020-01-24
Attending: OBSTETRICS & GYNECOLOGY
Payer: MEDICARE

## 2020-01-24 DIAGNOSIS — Z12.31 OTHER SCREENING MAMMOGRAM: ICD-10-CM

## 2020-01-24 PROCEDURE — 77067 SCR MAMMO BI INCL CAD: CPT

## 2020-04-08 ENCOUNTER — HOSPITAL ENCOUNTER (OUTPATIENT)
Dept: LAB | Facility: MEDICAL CENTER | Age: 75
End: 2020-04-08
Attending: FAMILY MEDICINE
Payer: MEDICARE

## 2020-04-08 DIAGNOSIS — E78.5 DYSLIPIDEMIA: ICD-10-CM

## 2020-04-08 DIAGNOSIS — R73.01 IMPAIRED FASTING GLUCOSE: ICD-10-CM

## 2020-04-08 DIAGNOSIS — D72.829 LEUKOCYTOSIS, UNSPECIFIED TYPE: ICD-10-CM

## 2020-04-08 DIAGNOSIS — R07.89 OTHER CHEST PAIN: ICD-10-CM

## 2020-04-08 DIAGNOSIS — R00.2 HEART PALPITATIONS: ICD-10-CM

## 2020-04-08 DIAGNOSIS — I15.9 SECONDARY HYPERTENSION: ICD-10-CM

## 2020-04-08 DIAGNOSIS — R74.8 ELEVATED LIPASE: ICD-10-CM

## 2020-04-08 DIAGNOSIS — Z72.89 OTHER PROBLEMS RELATED TO LIFESTYLE: ICD-10-CM

## 2020-04-08 LAB
ALBUMIN SERPL BCP-MCNC: 4.6 G/DL (ref 3.2–4.9)
ALBUMIN/GLOB SERPL: 1.5 G/DL
ALP SERPL-CCNC: 84 U/L (ref 30–99)
ALT SERPL-CCNC: 19 U/L (ref 2–50)
ANION GAP SERPL CALC-SCNC: 15 MMOL/L (ref 7–16)
AST SERPL-CCNC: 24 U/L (ref 12–45)
BASOPHILS # BLD AUTO: 0.8 % (ref 0–1.8)
BASOPHILS # BLD: 0.06 K/UL (ref 0–0.12)
BILIRUB SERPL-MCNC: 0.5 MG/DL (ref 0.1–1.5)
BUN SERPL-MCNC: 18 MG/DL (ref 8–22)
CALCIUM SERPL-MCNC: 9.2 MG/DL (ref 8.5–10.5)
CHLORIDE SERPL-SCNC: 103 MMOL/L (ref 96–112)
CHOLEST SERPL-MCNC: 231 MG/DL (ref 100–199)
CO2 SERPL-SCNC: 23 MMOL/L (ref 20–33)
CREAT SERPL-MCNC: 0.62 MG/DL (ref 0.5–1.4)
EOSINOPHIL # BLD AUTO: 0.17 K/UL (ref 0–0.51)
EOSINOPHIL NFR BLD: 2.2 % (ref 0–6.9)
ERYTHROCYTE [DISTWIDTH] IN BLOOD BY AUTOMATED COUNT: 44 FL (ref 35.9–50)
FASTING STATUS PATIENT QL REPORTED: NORMAL
GLOBULIN SER CALC-MCNC: 3 G/DL (ref 1.9–3.5)
GLUCOSE SERPL-MCNC: 94 MG/DL (ref 65–99)
HCT VFR BLD AUTO: 43.7 % (ref 37–47)
HCV AB SER QL: NORMAL
HDLC SERPL-MCNC: 82 MG/DL
HGB BLD-MCNC: 14.4 G/DL (ref 12–16)
IMM GRANULOCYTES # BLD AUTO: 0.02 K/UL (ref 0–0.11)
IMM GRANULOCYTES NFR BLD AUTO: 0.3 % (ref 0–0.9)
LDLC SERPL CALC-MCNC: 127 MG/DL
LIPASE SERPL-CCNC: 47 U/L (ref 11–82)
LYMPHOCYTES # BLD AUTO: 2.26 K/UL (ref 1–4.8)
LYMPHOCYTES NFR BLD: 29 % (ref 22–41)
MCH RBC QN AUTO: 30.8 PG (ref 27–33)
MCHC RBC AUTO-ENTMCNC: 33 G/DL (ref 33.6–35)
MCV RBC AUTO: 93.6 FL (ref 81.4–97.8)
MONOCYTES # BLD AUTO: 0.43 K/UL (ref 0–0.85)
MONOCYTES NFR BLD AUTO: 5.5 % (ref 0–13.4)
NEUTROPHILS # BLD AUTO: 4.86 K/UL (ref 2–7.15)
NEUTROPHILS NFR BLD: 62.2 % (ref 44–72)
NRBC # BLD AUTO: 0 K/UL
NRBC BLD-RTO: 0 /100 WBC
PLATELET # BLD AUTO: 344 K/UL (ref 164–446)
PMV BLD AUTO: 10.5 FL (ref 9–12.9)
POTASSIUM SERPL-SCNC: 3.7 MMOL/L (ref 3.6–5.5)
PROT SERPL-MCNC: 7.6 G/DL (ref 6–8.2)
RBC # BLD AUTO: 4.67 M/UL (ref 4.2–5.4)
SODIUM SERPL-SCNC: 141 MMOL/L (ref 135–145)
TRIGL SERPL-MCNC: 111 MG/DL (ref 0–149)
WBC # BLD AUTO: 7.8 K/UL (ref 4.8–10.8)

## 2020-04-08 PROCEDURE — 80061 LIPID PANEL: CPT

## 2020-04-08 PROCEDURE — 85025 COMPLETE CBC W/AUTO DIFF WBC: CPT

## 2020-04-08 PROCEDURE — 83690 ASSAY OF LIPASE: CPT

## 2020-04-08 PROCEDURE — 80053 COMPREHEN METABOLIC PANEL: CPT

## 2020-04-08 PROCEDURE — 36415 COLL VENOUS BLD VENIPUNCTURE: CPT

## 2020-04-08 PROCEDURE — 86803 HEPATITIS C AB TEST: CPT

## 2020-04-15 ENCOUNTER — TELEMEDICINE (OUTPATIENT)
Dept: MEDICAL GROUP | Facility: MEDICAL CENTER | Age: 75
End: 2020-04-15
Payer: MEDICARE

## 2020-04-15 VITALS — BODY MASS INDEX: 21.01 KG/M2 | WEIGHT: 104 LBS | DIASTOLIC BLOOD PRESSURE: 82 MMHG | SYSTOLIC BLOOD PRESSURE: 147 MMHG

## 2020-04-15 DIAGNOSIS — E78.5 DYSLIPIDEMIA: ICD-10-CM

## 2020-04-15 DIAGNOSIS — R51.9 NONINTRACTABLE HEADACHE, UNSPECIFIED CHRONICITY PATTERN, UNSPECIFIED HEADACHE TYPE: ICD-10-CM

## 2020-04-15 DIAGNOSIS — Z00.00 HEALTHCARE MAINTENANCE: ICD-10-CM

## 2020-04-15 PROBLEM — D72.829 LEUKOCYTOSIS: Status: RESOLVED | Noted: 2019-10-15 | Resolved: 2020-04-15

## 2020-04-15 PROBLEM — R74.8 ELEVATED LIPASE: Status: RESOLVED | Noted: 2019-10-15 | Resolved: 2020-04-15

## 2020-04-15 PROCEDURE — G0439 PPPS, SUBSEQ VISIT: HCPCS | Mod: CR,95 | Performed by: FAMILY MEDICINE

## 2020-04-15 RX ORDER — FAMOTIDINE 40 MG/1
40 TABLET, FILM COATED ORAL 2 TIMES DAILY
COMMUNITY
End: 2021-08-11

## 2020-04-15 RX ORDER — MAGNESIUM GLUCONATE 27 MG(500)
500 TABLET ORAL DAILY
COMMUNITY
End: 2022-06-09

## 2020-04-15 ASSESSMENT — ACTIVITIES OF DAILY LIVING (ADL): BATHING_REQUIRES_ASSISTANCE: 0

## 2020-04-15 ASSESSMENT — PATIENT HEALTH QUESTIONNAIRE - PHQ9
SUM OF ALL RESPONSES TO PHQ QUESTIONS 1-9: 0
CLINICAL INTERPRETATION OF PHQ2 SCORE: 0

## 2020-04-15 ASSESSMENT — ENCOUNTER SYMPTOMS: GENERAL WELL-BEING: GOOD

## 2020-04-15 ASSESSMENT — FIBROSIS 4 INDEX: FIB4 SCORE: 1.18

## 2020-04-15 NOTE — ASSESSMENT & PLAN NOTE
Lipids: done 2020.  Fasting Glucose: done 2020.   Hepatitis C Screen: done 2020 and normal.   Colonoscopy: follows regularly with GI.   Mammogram: done 2020 BiRADS2  Pap: done by Dr. Pittman (gynecology).   DEXA: done 2014.     Pneumonia vaccine: Prevnar given 2016, Pneumovax given 2012.   Tdap: given 2012.

## 2020-04-15 NOTE — ASSESSMENT & PLAN NOTE
The patient has a dyslipidemia with a 10-year ASCVD risk of 18%.  She is reluctant to start a statin medicine because she has a number of drug allergies.  She did buy something called Cholesterol 316 is wondering if she should start this medicine.

## 2020-04-15 NOTE — PROGRESS NOTES
Telemedicine Visit: Established Patient Annual Wellness Visit     This encounter was conducted via Zoom .   Verbal consent was obtained. Patient's identity was verified.    Chief Complaint   Patient presents with   • Annual Wellness Visit   • Hypercholesterolemia Follow-up     discuss         HPI:  Radha is a 74 y.o. here for Medicare Annual Wellness Visit    Healthcare maintenance  Lipids: done 2020.  Fasting Glucose: done 2020.   Hepatitis C Screen: done 2020 and normal.   Colonoscopy: follows regularly with GI.   Mammogram: done 2020 BiRADS2  Pap: done by Dr. Pittman (gynecology).   DEXA: done 2014.     Pneumonia vaccine: Prevnar given 2016, Pneumovax given 2012.   Tdap: given 2012.     Dyslipidemia  The patient has a dyslipidemia with a 10-year ASCVD risk of 18%.  She is reluctant to start a statin medicine because she has a number of drug allergies.  She did buy something called Cholesterol 316 is wondering if she should start this medicine.      Patient Active Problem List    Diagnosis Date Noted   • Healthcare maintenance 10/15/2019   • Leukocytosis 10/15/2019   • Elevated lipase 10/15/2019   • Headache 10/15/2019   • Acute midline low back pain without sciatica 05/10/2018   • Impaired fasting glucose 06/27/2016   • Heart palpitations 11/03/2015   • Other chest pain 10/07/2015   • Chronic primary angle-closure glaucoma of both eyes, mild stage 03/17/2015   • Osteopenia of multiple sites 03/17/2015   • Chronic midline low back pain without sciatica 09/23/2014   • History of shingles 09/23/2014   • Cervicalgia 07/02/2013   • Gastroesophageal reflux disease without esophagitis 03/22/2011   • Dyslipidemia 03/22/2011       Current Outpatient Medications   Medication Sig Dispense Refill   • magnesium gluconate (MAG-G) 500 MG tablet Take 500 mg by mouth every day.     • famotidine (PEPCID) 40 MG Tab Take 40 mg by mouth 2 times a day.     • diphenhydrAMINE (BENADRYL) 25 MG Tab Take 25 mg by mouth every 6 hours as needed  for Sleep.     • WHITE MULBERRY SC Inject  as instructed.     • ibuprofen (MOTRIN) 200 MG Tab Take 200 mg by mouth every 6 hours as needed.     • D-MANNOSE PO Take 500 mg by mouth 2 Times a Day.     • aspirin (ASA) 81 MG Chew Tab chewable tablet Take 81 mg by mouth every day.     • latanoprost (XALATAN) 0.005 % SOLN Place 1 Drop in both eyes every evening. Lt eye daily      • Vinpocetine Powder by Does not apply route.     • Calcium-Magnesium-Vitamin D (CALCIUM MAGNESIUM PO) Take  by mouth every day.     • raNITidine (ZANTAC) 150 MG Tab Take 150 mg by mouth 2 times a day.       No current facility-administered medications for this visit.         Patient is taking medications as noted in medication list.  Current supplements as per medication list.     Allergies: Metronidazole; Azithromycin; Ciprofloxacin; Diclofenac; Doxycycline; Methylprednisolone; Metoprolol; Myrbetriq [mirabegron]; Other drug; and Pcn [penicillins]    Current social contact/activities: Pt. Socializes with family and friends.     Is patient current with immunizations? No, due for SHINGRIX (Shingles). Patient is interested in receiving NONE today.    She  reports that she has never smoked. She has never used smokeless tobacco. She reports that she does not drink alcohol or use drugs.  Counseling given: Not Answered        DPA/Advanced directive: Patient does not have an Advanced Directive.  A packet and workshop information was given on Advanced Directives.    ROS:    Gait: Uses no assistive device   Ostomy: No   Other tubes: No   Amputations: No   Chronic oxygen use No   Last eye exam hazel coming up 4/30/20   Wears hearing aids: No   : Denies any urinary leakage during the last 6 months    Depression Screening    Little interest or pleasure in doing things?  0 - not at all  Feeling down, depressed, or hopeless? 0 - not at all  Trouble falling or staying asleep, or sleeping too much?     Feeling tired or having little energy?     Poor appetite or  overeating?     Feeling bad about yourself - or that you are a failure or have let yourself or your family down?    Trouble concentrating on things, such as reading the newspaper or watching television?    Moving or speaking so slowly that other people could have noticed.  Or the opposite - being so fidgety or restless that you have been moving around a lot more than usual?     Thoughts that you would be better off dead, or of hurting yourself?     Patient Health Questionnaire Score: 0      If depressive symptoms identified deferred to follow up visit unless specifically addressed in assessment and plan.    Interpretation of PHQ-9 Total Score   Score Severity   1-4 No Depression   5-9 Mild Depression   10-14 Moderate Depression   15-19 Moderately Severe Depression   20-27 Severe Depression      Screening for Cognitive Impairment    Three Minute Recall (village, kitchen, baby)  3/3    Draw clock face with all 12 numbers and set the hands to show 10 past 10.    Telephone visit.  If cognitive concerns identified, deferred for follow up unless specifically addressed in assessment and plan.    Fall Risk Assessment    Has the patient had two or more falls in the last year or any fall with injury in the last year?  No  If fall risk identified, deferred for follow up unless specifically addressed in assessment and plan.      Safety Assessment    Throw rugs on floor.  Yes  Handrails on all stairs.  Yes  Good lighting in all hallways.  Yes  Difficulty hearing.  No  Patient counseled about all safety risks that were identified.    Functional Assessment ADLs    Are there any barriers preventing you from cooking for yourself or meeting nutritional needs?  No.    Are there any barriers preventing you from driving safely or obtaining transportation?  No.    Are there any barriers preventing you from using a telephone or calling for help?  No.    Are there any barriers preventing you from shopping?  No.    Are there any barriers  preventing you from taking care of your own finances?  No.    Are there any barriers preventing you from managing your medications?    No.    Are there any barriers preventing you from showering, bathing or dressing yourself?  No.    Are you currently engaging in any exercise or physical activity?  Yes.  Pt. Walks every morning.  Takes care of her home.  What is your perception of your health?  Good.    Health Maintenance Summary                Annual Wellness Visit Overdue 1945     IMM ZOSTER VACCINES Overdue 11/18/2014      Done 9/23/2014 Imm Admin: Zoster Vaccine Live (ZVL) (Zostavax)    MAMMOGRAM Next Due 1/24/2021      Done 1/24/2020 MA-SCREENING MAMMO BILAT W/TOMOSYNTHESIS W/CAD     Patient has more history with this topic...    BONE DENSITY Next Due 11/17/2021      Done 11/17/2016 DS-BONE DENSITY STUDY (DEXA)     Patient has more history with this topic...    IMM DTaP/Tdap/Td Vaccine Next Due 11/21/2022      Done 11/21/2012 Imm Admin: Tdap Vaccine    COLONOSCOPY Next Due 1/10/2024      Done 1/10/2019 REFERRAL TO GI FOR COLONOSCOPY     Patient has more history with this topic...          Patient Care Team:  Christian Green M.D. as PCP - General (Family Medicine)  Felix Razo M.D. as Consulting Physician (Gynecology)  Juancarlos Elizalde M.D. as Consulting Physician (Gastroenterology)  Cisco Estrella M.D. as Consulting Physician (Ophthalmology)  Gerber Strickland M.D. as Consulting Physician (Cardiology)  Familia Leyva, PT, DPT, OCS (Inactive) as Physical Therapy (Physical Therapy)  Viral Rankin Ass't as        Social History     Tobacco Use   • Smoking status: Never Smoker   • Smokeless tobacco: Never Used   Substance Use Topics   • Alcohol use: No     Alcohol/week: 0.0 oz   • Drug use: No     Family History   Problem Relation Age of Onset   • Heart Disease Mother         chf   • Cancer Father    • Stroke Father 70   • Diabetes Father      She  has a past  medical history of Anesthesia, Arthritis, CATARACT, Chronic frontal sinusitis (7/16/2019), GERD (gastroesophageal reflux disease), Glaucoma, Hyperlipidemia, and Urinary bladder disorder.   Past Surgical History:   Procedure Laterality Date   • COLONOSCOPY  12/17/13    2 polyps   • KNEE ARTHROSCOPY  1/9/2013    Performed by Seun Giles M.D. at SURGERY [facility] ORS   • MEDIAL MENISCECTOMY  1/9/2013    Performed by Seun Giles M.D. at SURGERY [facility] ORS   • KNEE ARTHROSCOPY  7/13/2010    Performed by HAL WRIGHT at SURGERY SAME DAY [facility] ORS   • MEDIAL MENISCECTOMY  7/13/2010    Performed by HAL WRIGHT at SURGERY SAME DAY [facility] ORS   • ABDOMINAL HYSTERECTOMY TOTAL      gall bladder         Exam:     /82 (BP Location: Left arm, Patient Position: Sitting, BP Cuff Size: Adult)   Wt 47.2 kg (104 lb)  Body mass index is 21.01 kg/m².    Hearing good.    Dentition good  Alert, oriented in no acute distress.  Eye contact is good, speech goal directed, affect calm    Assessment and Plan. The following treatment and monitoring plan is recommended:     1. Dyslipidemia  I informed the patient that statin medicine would likely slightly reduce her risk of heart attack and stroke, however, because she is nearing the age of 75 the benefits would be diminished.  She would like to hold off and instead try her cholesterol 360.  We will recheck her lipid panel in 6 months.  We also discussed dietary modification.  - Lipid Profile; Future    2. Healthcare maintenance  - see HPI.   - Subsequent Annual Wellness Visit - Includes PPPS ()        Services suggested: No services needed at this time  Health Care Screening recommendations as per orders if indicated.  Referrals offered: PT/OT/Nutrition counseling/Behavioral Health/Smoking cessation as per orders if indicated.    Discussion today about general wellness and lifestyle habits:    · Prevent falls and reduce trip hazards; Cautioned about securing  or removing rugs.  · Have a working fire alarm and carbon monoxide detector;   · Engage in regular physical activity and social activities       Follow-up: 6 mo.

## 2020-05-19 ENCOUNTER — OFFICE VISIT (OUTPATIENT)
Dept: MEDICAL GROUP | Facility: MEDICAL CENTER | Age: 75
End: 2020-05-19
Payer: MEDICARE

## 2020-05-19 VITALS
WEIGHT: 116 LBS | DIASTOLIC BLOOD PRESSURE: 82 MMHG | OXYGEN SATURATION: 97 % | SYSTOLIC BLOOD PRESSURE: 112 MMHG | BODY MASS INDEX: 22.78 KG/M2 | HEART RATE: 75 BPM | RESPIRATION RATE: 14 BRPM | TEMPERATURE: 98.6 F | HEIGHT: 60 IN

## 2020-05-19 DIAGNOSIS — M54.2 CERVICALGIA: ICD-10-CM

## 2020-05-19 DIAGNOSIS — H40.2231 CHRONIC PRIMARY ANGLE-CLOSURE GLAUCOMA OF BOTH EYES, MILD STAGE: ICD-10-CM

## 2020-05-19 PROCEDURE — 99213 OFFICE O/P EST LOW 20 MIN: CPT | Performed by: FAMILY MEDICINE

## 2020-05-19 RX ORDER — FLUTICASONE PROPIONATE 50 MCG
1 SPRAY, SUSPENSION (ML) NASAL DAILY
COMMUNITY

## 2020-05-19 RX ORDER — MONTELUKAST SODIUM 10 MG/1
10 TABLET ORAL DAILY
COMMUNITY
End: 2021-08-11

## 2020-05-19 ASSESSMENT — FIBROSIS 4 INDEX: FIB4 SCORE: 1.18

## 2020-05-19 NOTE — PROGRESS NOTES
[facility]  Progress Note  Established Patient    Subjective:   Radha Kerr is a 74 y.o. female here today with a chief complaint of neck pain and here to fill out a DMV form. The patient is alone.     Cervicalgia  Patient has longstanding neck pain.  It is mild.  It does not cause range of motion limitations.  Of note, she had an MRI in 2013 of the cervical spine.  She is here for a DMV form.    Chronic primary angle-closure glaucoma of both eyes, mild stage  Patient follows with her eye doctor for this issue and she achieves good vision with corrective lenses.      Current Outpatient Medications on File Prior to Visit   Medication Sig Dispense Refill   • fluticasone (FLONASE) 50 MCG/ACT nasal spray Spray 1 Spray in nose every day.     • montelukast (SINGULAIR) 10 MG Tab Take 10 mg by mouth every day.     • magnesium gluconate (MAG-G) 500 MG tablet Take 500 mg by mouth every day.     • famotidine (PEPCID) 40 MG Tab Take 40 mg by mouth 2 times a day.     • diphenhydrAMINE (BENADRYL) 25 MG Tab Take 25 mg by mouth every 6 hours as needed for Sleep.     • WHITE MULBERRY SC Inject  as instructed.     • ibuprofen (MOTRIN) 200 MG Tab Take 200 mg by mouth every 6 hours as needed.     • D-MANNOSE PO Take 500 mg by mouth 2 Times a Day.     • aspirin (ASA) 81 MG Chew Tab chewable tablet Take 81 mg by mouth every day.     • latanoprost (XALATAN) 0.005 % SOLN Place 1 Drop in both eyes every evening. Lt eye daily        No current facility-administered medications on file prior to visit.        Past Medical History:   Diagnosis Date   • Anesthesia     nausea   • Arthritis    • CATARACT     early stages   • Chronic frontal sinusitis 7/16/2019   • GERD (gastroesophageal reflux disease)    • Glaucoma     left eye   • Hyperlipidemia    • Urinary bladder disorder        Allergies: Metronidazole; Azithromycin; Ciprofloxacin; Diclofenac; Doxycycline; Methylprednisolone; Metoprolol; Myrbetriq [mirabegron]; Other drug; and Pcn  "[penicillins]    Surgical History:  has a past surgical history that includes abdominal hysterectomy total; knee arthroscopy (7/13/2010); medial meniscectomy (7/13/2010); knee arthroscopy (1/9/2013); medial meniscectomy (1/9/2013); and colonoscopy (12/17/13).    Family History: family history includes Cancer in her father; Diabetes in her father; Heart Disease in her mother; Stroke (age of onset: 70) in her father.    Social History:  reports that she has never smoked. She has never used smokeless tobacco. She reports that she does not drink alcohol or use drugs.    ROS: no fever or cough.        Objective:     Vitals:    05/19/20 1015   BP: 112/82   BP Location: Left arm   Patient Position: Sitting   BP Cuff Size: Adult   Pulse: 75   Resp: 14   Temp: 37 °C (98.6 °F)   TempSrc: Temporal   SpO2: 97%   Weight: 52.6 kg (116 lb)   Height: 1.511 m (4' 11.5\")       Physical Exam:  General: alert in no apparent distress.   MSK: Strength 5 out of 5 x 4, full range of motion of the neck.  No foot drop bilaterally.  Neuro: Cranial nerves II through XII intact, finger-nose normal, gait normal.        Assessment and Plan:     1. Cervicalgia  - monitor.     2. Chronic primary angle-closure glaucoma of both eyes, mild stage  - f/u eye doctor.     Note: DMV form filled out, original provided to patient.         Followup: Return if symptoms worsen or fail to improve.         "

## 2020-05-19 NOTE — ASSESSMENT & PLAN NOTE
Patient follows with her eye doctor for this issue and she achieves good vision with corrective lenses.

## 2020-05-19 NOTE — ASSESSMENT & PLAN NOTE
Patient has longstanding neck pain.  It is mild.  It does not cause range of motion limitations.  Of note, she had an MRI in 2013 of the cervical spine.  She is here for a DMV form.

## 2020-08-05 ENCOUNTER — APPOINTMENT (RX ONLY)
Dept: URBAN - METROPOLITAN AREA CLINIC 22 | Facility: CLINIC | Age: 75
Setting detail: DERMATOLOGY
End: 2020-08-05

## 2020-08-05 DIAGNOSIS — L84 CORNS AND CALLOSITIES: ICD-10-CM

## 2020-08-05 DIAGNOSIS — D18.0 HEMANGIOMA: ICD-10-CM

## 2020-08-05 DIAGNOSIS — Z71.89 OTHER SPECIFIED COUNSELING: ICD-10-CM

## 2020-08-05 DIAGNOSIS — L82.1 OTHER SEBORRHEIC KERATOSIS: ICD-10-CM

## 2020-08-05 DIAGNOSIS — D22 MELANOCYTIC NEVI: ICD-10-CM

## 2020-08-05 DIAGNOSIS — L81.4 OTHER MELANIN HYPERPIGMENTATION: ICD-10-CM

## 2020-08-05 PROBLEM — D18.01 HEMANGIOMA OF SKIN AND SUBCUTANEOUS TISSUE: Status: ACTIVE | Noted: 2020-08-05

## 2020-08-05 PROBLEM — D22.71 MELANOCYTIC NEVI OF RIGHT LOWER LIMB, INCLUDING HIP: Status: ACTIVE | Noted: 2020-08-05

## 2020-08-05 PROBLEM — D22.5 MELANOCYTIC NEVI OF TRUNK: Status: ACTIVE | Noted: 2020-08-05

## 2020-08-05 PROCEDURE — 99214 OFFICE O/P EST MOD 30 MIN: CPT

## 2020-08-05 PROCEDURE — ? COUNSELING

## 2020-08-05 ASSESSMENT — LOCATION ZONE DERM
LOCATION ZONE: FACE
LOCATION ZONE: TRUNK
LOCATION ZONE: ARM
LOCATION ZONE: TOE
LOCATION ZONE: FEET
LOCATION ZONE: NECK

## 2020-08-05 ASSESSMENT — LOCATION DETAILED DESCRIPTION DERM
LOCATION DETAILED: RIGHT INFERIOR ANTERIOR NECK
LOCATION DETAILED: RIGHT DORSAL FOOT
LOCATION DETAILED: LEFT 5TH TOE
LOCATION DETAILED: INFERIOR MID FOREHEAD
LOCATION DETAILED: LOWER STERNUM
LOCATION DETAILED: LEFT VENTRAL PROXIMAL FOREARM
LOCATION DETAILED: RIGHT VENTRAL PROXIMAL FOREARM
LOCATION DETAILED: INFERIOR THORACIC SPINE
LOCATION DETAILED: SUPERIOR THORACIC SPINE

## 2020-08-05 ASSESSMENT — LOCATION SIMPLE DESCRIPTION DERM
LOCATION SIMPLE: RIGHT FOOT
LOCATION SIMPLE: UPPER BACK
LOCATION SIMPLE: INFERIOR FOREHEAD
LOCATION SIMPLE: LEFT FOREARM
LOCATION SIMPLE: CHEST
LOCATION SIMPLE: RIGHT FOREARM
LOCATION SIMPLE: RIGHT ANTERIOR NECK
LOCATION SIMPLE: LEFT 5TH TOE

## 2020-08-31 ENCOUNTER — OFFICE VISIT (OUTPATIENT)
Dept: MEDICAL GROUP | Facility: MEDICAL CENTER | Age: 75
End: 2020-08-31
Payer: MEDICARE

## 2020-08-31 VITALS
WEIGHT: 116.6 LBS | BODY MASS INDEX: 23.51 KG/M2 | OXYGEN SATURATION: 98 % | HEART RATE: 82 BPM | RESPIRATION RATE: 18 BRPM | HEIGHT: 59 IN | TEMPERATURE: 98 F | DIASTOLIC BLOOD PRESSURE: 86 MMHG | SYSTOLIC BLOOD PRESSURE: 144 MMHG

## 2020-08-31 DIAGNOSIS — M54.42 ACUTE LEFT-SIDED LOW BACK PAIN WITH LEFT-SIDED SCIATICA: ICD-10-CM

## 2020-08-31 PROBLEM — M54.50 ACUTE MIDLINE LOW BACK PAIN WITHOUT SCIATICA: Status: RESOLVED | Noted: 2018-05-10 | Resolved: 2020-08-31

## 2020-08-31 PROCEDURE — 99214 OFFICE O/P EST MOD 30 MIN: CPT | Performed by: FAMILY MEDICINE

## 2020-08-31 RX ORDER — CYCLOBENZAPRINE HCL 5 MG
5 TABLET ORAL 2 TIMES DAILY PRN
Qty: 20 TAB | Refills: 0 | Status: SHIPPED
Start: 2020-08-31 | End: 2021-01-21

## 2020-08-31 ASSESSMENT — FIBROSIS 4 INDEX: FIB4 SCORE: 1.2

## 2020-08-31 NOTE — PROGRESS NOTES
[facility]  Progress Note  Established Patient    Subjective:   Radha Kerr is a 75 y.o. female here today with a chief complaint of back pain. The patient is alone.     Acute left-sided low back pain with left-sided sciatica  Patient presents today with a new problem.  She states that on Friday she developed left lower back pain with sharp, shooting pains down the left leg.  This is currently rated as 5 out of 10 in severity and does tend to respond to ibuprofen.  Patient does not describe any associated injury or trauma.  Patient denies bowel or bladder incontinence retention or perineal anesthesia.  Her symptoms are improving.  Of note, she had an x-ray of her lumbar spine a little over a year ago demonstrating some degenerative change.      Current Outpatient Medications on File Prior to Visit   Medication Sig Dispense Refill   • fluticasone (FLONASE) 50 MCG/ACT nasal spray Spray 1 Spray in nose every day.     • montelukast (SINGULAIR) 10 MG Tab Take 10 mg by mouth every day.     • magnesium gluconate (MAG-G) 500 MG tablet Take 500 mg by mouth every day.     • famotidine (PEPCID) 40 MG Tab Take 40 mg by mouth 2 times a day.     • diphenhydrAMINE (BENADRYL) 25 MG Tab Take 25 mg by mouth every 6 hours as needed for Sleep.     • WHITE MULBERRY SC Inject  as instructed.     • ibuprofen (MOTRIN) 200 MG Tab Take 200 mg by mouth every 6 hours as needed.     • D-MANNOSE PO Take 500 mg by mouth 2 Times a Day.     • aspirin (ASA) 81 MG Chew Tab chewable tablet Take 81 mg by mouth every day.     • latanoprost (XALATAN) 0.005 % SOLN Place 1 Drop in both eyes every evening. Lt eye daily        No current facility-administered medications on file prior to visit.        Past Medical History:   Diagnosis Date   • Anesthesia     nausea   • Arthritis    • CATARACT     early stages   • Chronic frontal sinusitis 7/16/2019   • GERD (gastroesophageal reflux disease)    • Glaucoma     left eye   • Hyperlipidemia    • Urinary  "bladder disorder        Allergies: Metronidazole, Azithromycin, Ciprofloxacin, Diclofenac, Doxycycline, Methylprednisolone, Metoprolol, Myrbetriq [mirabegron], Other drug, and Pcn [penicillins]    Surgical History:  has a past surgical history that includes abdominal hysterectomy total; knee arthroscopy (7/13/2010); medial meniscectomy (7/13/2010); knee arthroscopy (1/9/2013); medial meniscectomy (1/9/2013); and colonoscopy (12/17/13).    Family History: family history includes Cancer in her father; Diabetes in her father; Heart Disease in her mother; Stroke (age of onset: 70) in her father.    Social History:  reports that she has never smoked. She has never used smokeless tobacco. She reports that she does not drink alcohol or use drugs.    ROS: see HPI.        Objective:     Vitals:    08/31/20 1142   BP: 144/86   BP Location: Left arm   Patient Position: Sitting   BP Cuff Size: Adult long   Pulse: 82   Resp: 18   Temp: 36.7 °C (98 °F)   TempSrc: Temporal   SpO2: 98%   Weight: 52.9 kg (116 lb 9.6 oz)   Height: 1.499 m (4' 11\")       Physical Exam:  General: alert in no apparent distress.   MSK: No tenderness to palpation over the spine or trochanteric bursae bilaterally.  Sensation is intact in all dermatomal distributions of the bilateral lower extremities.  Strength is 5 out of 5 in the bilateral lower extremities with normal gait.  Negative straight leg raise bilaterally.  Negative Jesi on the left.        Assessment and Plan:     1. Acute left-sided low back pain with left-sided sciatica  Patient presents with an acute left lower back pain with no associated trauma and radicular symptoms.  There are no red flags to warrant an MRI at the current time.  I recommended supportive measures including judicious use of ibuprofen.  I provided her with the [facility] spine conditioning program and asked that she let me know if her condition worsens or does not completely resolve within 4 weeks, at which point we could " consider obtaining an MRI.        Followup: Return if symptoms worsen or fail to improve.

## 2020-08-31 NOTE — ASSESSMENT & PLAN NOTE
Patient presents today with a new problem.  She states that on Friday she developed left lower back pain with sharp, shooting pains down the left leg.  This is currently rated as 5 out of 10 in severity and does tend to respond to ibuprofen.  Patient does not describe any associated injury or trauma.  Patient denies bowel or bladder incontinence retention or perineal anesthesia.  Her symptoms are improving.  Of note, she had an x-ray of her lumbar spine a little over a year ago demonstrating some degenerative change.

## 2020-10-14 ENCOUNTER — HOSPITAL ENCOUNTER (OUTPATIENT)
Dept: LAB | Facility: MEDICAL CENTER | Age: 75
End: 2020-10-14
Attending: FAMILY MEDICINE
Payer: MEDICARE

## 2020-10-14 DIAGNOSIS — E78.5 DYSLIPIDEMIA: ICD-10-CM

## 2020-10-14 LAB
CHOLEST SERPL-MCNC: 214 MG/DL (ref 100–199)
FASTING STATUS PATIENT QL REPORTED: NORMAL
HDLC SERPL-MCNC: 83 MG/DL
LDLC SERPL CALC-MCNC: 117 MG/DL
TRIGL SERPL-MCNC: 68 MG/DL (ref 0–149)

## 2020-10-14 PROCEDURE — 80061 LIPID PANEL: CPT

## 2020-10-14 PROCEDURE — 36415 COLL VENOUS BLD VENIPUNCTURE: CPT

## 2020-10-19 ENCOUNTER — TELEPHONE (OUTPATIENT)
Dept: MEDICAL GROUP | Facility: MEDICAL CENTER | Age: 75
End: 2020-10-19

## 2020-10-19 NOTE — TELEPHONE ENCOUNTER
ESTABLISHED PATIENT PRE-VISIT PLANNING     Patient was NOT contacted to complete PVP.     Note: Patient will not be contacted if there is no indication to call.     1.  Reviewed notes from the last few office visits within the medical group: Yes    2.  If any orders were placed at last visit or intended to be done for this visit (i.e. 6 mos follow-up), do we have Results/Consult Notes?        •  Labs - Labs ordered, completed on 10/14/20 and results are in chart.   Note: If patient appointment is for lab review and patient did not complete labs, check with provider if OK to reschedule patient until labs completed.       •  Imaging - Imaging was not ordered at last office visit.       •  Referrals - No referrals were ordered at last office visit.    3. Is this appointment scheduled as a Hospital Follow-Up? No    4.  Immunizations were updated in Epic using WebIZ?: Epic matches WebIZ       •  Web Iz Recommendations: FLU and SHINGRIX (Shingles)    5.  Patient is due for the following Health Maintenance Topics:   Health Maintenance Due   Topic Date Due   • IMM ZOSTER VACCINES (2 of 3) 11/18/2014   • IMM INFLUENZA (1) 09/01/2020       - Patient has completed Annual Wellness Visit (AWV) and HMR Letter(s) faxed to: n/a    6. Orders for overdue Health Maintenance topics pended in Pre-Charting? N\A    7.  AHA (MDX) form printed for Provider? YES    8.  Patient was NOT informed to arrive 15 min prior to their scheduled appointment and bring in their medication bottles.

## 2020-10-21 ENCOUNTER — OFFICE VISIT (OUTPATIENT)
Dept: MEDICAL GROUP | Facility: MEDICAL CENTER | Age: 75
End: 2020-10-21
Payer: MEDICARE

## 2020-10-21 VITALS
WEIGHT: 112.4 LBS | HEART RATE: 71 BPM | HEIGHT: 59 IN | TEMPERATURE: 97.1 F | SYSTOLIC BLOOD PRESSURE: 130 MMHG | OXYGEN SATURATION: 99 % | DIASTOLIC BLOOD PRESSURE: 84 MMHG | RESPIRATION RATE: 16 BRPM | BODY MASS INDEX: 22.66 KG/M2

## 2020-10-21 DIAGNOSIS — E78.5 DYSLIPIDEMIA: ICD-10-CM

## 2020-10-21 DIAGNOSIS — R25.2 LEG CRAMPS: ICD-10-CM

## 2020-10-21 DIAGNOSIS — Z23 NEED FOR VACCINATION: ICD-10-CM

## 2020-10-21 DIAGNOSIS — M85.89 OSTEOPENIA OF MULTIPLE SITES: ICD-10-CM

## 2020-10-21 DIAGNOSIS — R73.01 IMPAIRED FASTING GLUCOSE: ICD-10-CM

## 2020-10-21 DIAGNOSIS — K21.9 GASTROESOPHAGEAL REFLUX DISEASE WITHOUT ESOPHAGITIS: ICD-10-CM

## 2020-10-21 PROBLEM — M54.42 ACUTE LEFT-SIDED LOW BACK PAIN WITH LEFT-SIDED SCIATICA: Status: RESOLVED | Noted: 2020-08-31 | Resolved: 2020-10-21

## 2020-10-21 PROBLEM — R51.9 HEADACHE: Status: RESOLVED | Noted: 2019-10-15 | Resolved: 2020-10-21

## 2020-10-21 PROCEDURE — G0008 ADMIN INFLUENZA VIRUS VAC: HCPCS | Performed by: FAMILY MEDICINE

## 2020-10-21 PROCEDURE — 99214 OFFICE O/P EST MOD 30 MIN: CPT | Mod: 25 | Performed by: FAMILY MEDICINE

## 2020-10-21 PROCEDURE — 8041 PR SCP AHA: Performed by: FAMILY MEDICINE

## 2020-10-21 PROCEDURE — 90662 IIV NO PRSV INCREASED AG IM: CPT | Performed by: FAMILY MEDICINE

## 2020-10-21 ASSESSMENT — FIBROSIS 4 INDEX: FIB4 SCORE: 1.2

## 2020-10-21 NOTE — ASSESSMENT & PLAN NOTE
Patient has reflux managed with Pepcid.  She follows with [facility] and had an EGD in 2019.

## 2020-10-21 NOTE — PROGRESS NOTES
Subjective:     Radha Kerr is a 75 y.o. female here today for DLD and Annual Health Assessment.    Leg cramps  Patient describes longstanding moderate severity bilateral leg cramps that tend to occur in the morning that do not seem to be responding to magnesium.    Dyslipidemia  The patient's DLD has improved with some lifestyle changes. She is now 74 y/o.    Gastroesophageal reflux disease without esophagitis  Patient has reflux managed with Pepcid.  She follows with [facility] and had an EGD in 2019.    Impaired fasting glucose  Noted on previous labs.  Resolved on recent testing.    Osteopenia of multiple sites  Patient has osteopenia identified on past DEXA scan.  The FRAX risk was not elevated.  The patient is already on calcium and vitamin D.          Health Maintenance Summary                IMM ZOSTER VACCINES Overdue 11/18/2014      Done 9/23/2014 Imm Admin: Zoster Vaccine Live (ZVL) (Zostavax)    IMM INFLUENZA Overdue 9/1/2020      Done 10/15/2019 Imm Admin: Influenza Vaccine Adult HD     Patient has more history with this topic...    MAMMOGRAM Next Due 1/24/2021      Done 1/24/2020 MA-SCREENING MAMMO BILAT W/TOMOSYNTHESIS W/CAD     Patient has more history with this topic...    Annual Wellness Visit Next Due 4/16/2021      Done 4/15/2020 SUBSEQUENT ANNUAL WELLNESS VISIT-INCLUDES PPPS ()    BONE DENSITY Next Due 11/17/2021      Done 11/17/2016 DS-BONE DENSITY STUDY (DEXA)     Patient has more history with this topic...    IMM DTaP/Tdap/Td Vaccine Next Due 11/21/2022      Done 11/21/2012 Imm Admin: Tdap Vaccine    COLONOSCOPY Next Due 1/10/2024      Done 1/10/2019 REFERRAL TO GI FOR COLONOSCOPY     Patient has more history with this topic...           Annual Health Assessment Questions:     1.  Are you currently engaging in any exercise or physical activity? Yes    2.  How would you describe your mood or emotional well-being today? good    3.  Have you had any falls in the last year?  No    4.  Have you noticed any problems with your balance or had difficulty walking? No    5.  In the last six months have you experienced any leakage of urine? No    6. DPA/Advanced Directive: Patient does not have an Advanced Directive.  A packet and workshop information was given on Advanced Directives.    Current medicines (including changes today)  Current Outpatient Medications   Medication Sig Dispense Refill   • cyclobenzaprine (FLEXERIL) 5 MG tablet Take 1 Tab by mouth 2 times a day as needed for Muscle Spasms. 20 Tab 0   • fluticasone (FLONASE) 50 MCG/ACT nasal spray Spray 1 Spray in nose every day.     • montelukast (SINGULAIR) 10 MG Tab Take 10 mg by mouth every day.     • magnesium gluconate (MAG-G) 500 MG tablet Take 500 mg by mouth every day.     • famotidine (PEPCID) 40 MG Tab Take 40 mg by mouth 2 times a day.     • diphenhydrAMINE (BENADRYL) 25 MG Tab Take 25 mg by mouth every 6 hours as needed for Sleep.     • WHITE MULBERRY SC Inject  as instructed.     • ibuprofen (MOTRIN) 200 MG Tab Take 200 mg by mouth every 6 hours as needed.     • D-MANNOSE PO Take 500 mg by mouth 2 Times a Day.     • aspirin (ASA) 81 MG Chew Tab chewable tablet Take 81 mg by mouth every day.     • latanoprost (XALATAN) 0.005 % SOLN Place 1 Drop in both eyes every evening. Lt eye daily        No current facility-administered medications for this visit.        She  has a past medical history of Anesthesia, Arthritis, CATARACT, Chronic frontal sinusitis (7/16/2019), GERD (gastroesophageal reflux disease), Glaucoma, Hyperlipidemia, and Urinary bladder disorder.    Metronidazole, Azithromycin, Ciprofloxacin, Diclofenac, Doxycycline, Methylprednisolone, Metoprolol, Myrbetriq [mirabegron], Other drug, and Pcn [penicillins]    She  reports that she has never smoked. She has never used smokeless tobacco. She reports that she does not drink alcohol or use drugs.  Counseling given: Not Answered      ROS   No chest pain, no shortness  "of breath.      Objective:     Physical Exam:  /84 (BP Location: Left arm, Patient Position: Sitting, BP Cuff Size: Adult long)   Pulse 71   Temp 36.2 °C (97.1 °F) (Temporal)   Resp 16   Ht 1.499 m (4' 11\")   Wt 51 kg (112 lb 6.4 oz)   SpO2 99%  Body mass index is 22.7 kg/m².   Constitutional: Alert, no distress.  Respiratory: Unlabored respiratory effort, lungs clear to auscultation, no wheezes, no rhonchi.  Cardiovascular: Normal S1, S2, no murmur, no edema.  Ext: no BLE tenderness, negative Barbra.       Assessment and Plan:     1. Need for vaccination  - INFLUENZA VACCINE, HIGH DOSE (65+ ONLY)    2. Dyslipidemia  - no indication for statin.   - encouraged diet/exercise.     3. Impaired fasting glucose  - serial monitoring.     4. Osteopenia of multiple sites  - continue Ca and Vit D.     5. Leg cramps  - stay hydrated, continue Mg, add on B complex at night.     6. Gastroesophageal reflux disease without esophagitis  - f/u GI.       Discussion today about general wellness and lifestyle habits:    · Engage in regular physical activity and social activities.  · Prevent falls and reduce trip hazards; using ambulatory aides, hearing and vision testing if appropriate.  · Steps to improve urinary incontinence.  · Advanced care planning.    Follow-Up: Return in about 1 year (around 10/21/2021), or if symptoms worsen or fail to improve, for Wellness Visit, Long.         PLEASE NOTE: This dictation was created using voice recognition software. I have made every reasonable attempt to correct obvious errors, but I expect that there are errors of grammar and possibly content that I did not discover before finalizing the note.  "

## 2020-10-21 NOTE — ASSESSMENT & PLAN NOTE
Patient describes longstanding moderate severity bilateral leg cramps that tend to occur in the morning that do not seem to be responding to magnesium.

## 2020-12-04 ENCOUNTER — HOSPITAL ENCOUNTER (OUTPATIENT)
Dept: LAB | Facility: MEDICAL CENTER | Age: 75
End: 2020-12-04
Attending: INTERNAL MEDICINE
Payer: MEDICARE

## 2020-12-04 LAB
BUN SERPL-MCNC: 17 MG/DL (ref 8–22)
CREAT SERPL-MCNC: 0.66 MG/DL (ref 0.5–1.4)

## 2020-12-04 PROCEDURE — 82565 ASSAY OF CREATININE: CPT

## 2020-12-04 PROCEDURE — 36415 COLL VENOUS BLD VENIPUNCTURE: CPT

## 2020-12-04 PROCEDURE — 84520 ASSAY OF UREA NITROGEN: CPT

## 2020-12-07 DIAGNOSIS — J01.01 ACUTE RECURRENT MAXILLARY SINUSITIS: ICD-10-CM

## 2020-12-07 RX ORDER — AZITHROMYCIN 250 MG/1
TABLET, FILM COATED ORAL
Qty: 6 TAB | Refills: 0 | OUTPATIENT
Start: 2020-12-07

## 2020-12-09 ENCOUNTER — HOSPITAL ENCOUNTER (OUTPATIENT)
Dept: RADIOLOGY | Facility: MEDICAL CENTER | Age: 75
End: 2020-12-09
Attending: INTERNAL MEDICINE
Payer: MEDICARE

## 2020-12-09 DIAGNOSIS — R11.0 NAUSEA: ICD-10-CM

## 2020-12-09 DIAGNOSIS — R10.9 ABDOMINAL PAIN, UNSPECIFIED ABDOMINAL LOCATION: ICD-10-CM

## 2020-12-09 DIAGNOSIS — R63.4 UNINTENDED WEIGHT LOSS: ICD-10-CM

## 2020-12-09 PROCEDURE — 74177 CT ABD & PELVIS W/CONTRAST: CPT

## 2020-12-09 PROCEDURE — 700117 HCHG RX CONTRAST REV CODE 255: Performed by: INTERNAL MEDICINE

## 2020-12-09 RX ADMIN — IOHEXOL 100 ML: 350 INJECTION, SOLUTION INTRAVENOUS at 11:15

## 2020-12-09 RX ADMIN — IOHEXOL 25 ML: 240 INJECTION, SOLUTION INTRATHECAL; INTRAVASCULAR; INTRAVENOUS; ORAL at 10:00

## 2021-01-11 DIAGNOSIS — Z23 NEED FOR VACCINATION: ICD-10-CM

## 2021-01-21 ENCOUNTER — OFFICE VISIT (OUTPATIENT)
Dept: MEDICAL GROUP | Facility: MEDICAL CENTER | Age: 76
End: 2021-01-21
Payer: MEDICARE

## 2021-01-21 VITALS
RESPIRATION RATE: 18 BRPM | HEIGHT: 59 IN | OXYGEN SATURATION: 98 % | HEART RATE: 85 BPM | TEMPERATURE: 97.9 F | BODY MASS INDEX: 23.47 KG/M2 | DIASTOLIC BLOOD PRESSURE: 62 MMHG | WEIGHT: 116.4 LBS | SYSTOLIC BLOOD PRESSURE: 132 MMHG

## 2021-01-21 DIAGNOSIS — R51.9 NONINTRACTABLE HEADACHE, UNSPECIFIED CHRONICITY PATTERN, UNSPECIFIED HEADACHE TYPE: ICD-10-CM

## 2021-01-21 DIAGNOSIS — R73.01 IMPAIRED FASTING GLUCOSE: ICD-10-CM

## 2021-01-21 DIAGNOSIS — Z23 NEED FOR VACCINATION: ICD-10-CM

## 2021-01-21 DIAGNOSIS — E78.5 DYSLIPIDEMIA: ICD-10-CM

## 2021-01-21 PROCEDURE — 90750 HZV VACC RECOMBINANT IM: CPT | Performed by: FAMILY MEDICINE

## 2021-01-21 PROCEDURE — 90471 IMMUNIZATION ADMIN: CPT | Performed by: FAMILY MEDICINE

## 2021-01-21 PROCEDURE — 99214 OFFICE O/P EST MOD 30 MIN: CPT | Mod: 25 | Performed by: FAMILY MEDICINE

## 2021-01-21 ASSESSMENT — PATIENT HEALTH QUESTIONNAIRE - PHQ9: CLINICAL INTERPRETATION OF PHQ2 SCORE: 0

## 2021-01-21 ASSESSMENT — FIBROSIS 4 INDEX: FIB4 SCORE: 1.2

## 2021-01-21 NOTE — PROGRESS NOTES
"[facility]  Progress Note  Established Patient    Subjective:   Radha Kerr is a 75 y.o. female here today with a chief complaint of headache. The patient is alone, both of us are masked.     Headache  Last week the patient developed a left temporal headache that also settled behind her eye.  It was only limitedly responsive to Tylenol and so she took Excedrin Migraine which seemed to help.  She does wake up every morning with a runny nose but denies known sinus pressure, visual change, neck pain or shoulder pain.  She has had this type of headache before.    Impaired fasting glucose  Noted on previous labs.  Improved on recheck.    Dyslipidemia  A bit elevated but > 74 y/o so no indication for statin.       Current Outpatient Medications on File Prior to Visit   Medication Sig Dispense Refill   • fluticasone (FLONASE) 50 MCG/ACT nasal spray Spray 1 Spray in nose every day.     • montelukast (SINGULAIR) 10 MG Tab Take 10 mg by mouth every day.     • magnesium gluconate (MAG-G) 500 MG tablet Take 500 mg by mouth every day.     • famotidine (PEPCID) 40 MG Tab Take 40 mg by mouth 2 times a day.     • WHITE MULBERRY SC Inject  as instructed.     • ibuprofen (MOTRIN) 200 MG Tab Take 200 mg by mouth every 6 hours as needed.     • D-MANNOSE PO Take 500 mg by mouth 2 Times a Day.     • aspirin (ASA) 81 MG Chew Tab chewable tablet Take 81 mg by mouth every day.     • latanoprost (XALATAN) 0.005 % SOLN Place 1 Drop in both eyes every evening. Lt eye daily        No current facility-administered medications on file prior to visit.           Objective:     Vitals:    01/21/21 1530   BP: 132/62   BP Location: Left arm   Patient Position: Sitting   BP Cuff Size: Adult long   Pulse: 85   Resp: 18   Temp: 36.6 °C (97.9 °F)   TempSrc: Temporal   SpO2: 98%   Weight: 52.8 kg (116 lb 6.4 oz)   Height: 1.499 m (4' 11\")       Physical Exam:  General: alert in no apparent distress.   Head: No temporal tenderness.  ENMT: " Pharyngeal cobblestoning identified.        Assessment and Plan:     1. Impaired fasting glucose  - improved.     2. Dyslipidemia  - diet/exercise.     3. Nonintractable headache, unspecified chronicity pattern, unspecified headache type  There are no red flags to warrant immediate imaging.  The patient has no temporal tenderness or visual change so I do not think temporal arteritis is a consideration here.  I think the most likely scenario here is a sinus headache.  I recommended NeilMed sinus rinse every night followed by Flonase along with Claritin in the morning.  I will see her back in 10 days via video visit to ensure resolution.  If her condition worsens between now and then, I asked that she let me know.        Followup: Return in about 1 week (around 1/28/2021), or if symptoms worsen or fail to improve.

## 2021-01-21 NOTE — ASSESSMENT & PLAN NOTE
Last week the patient developed a left temporal headache that also settled behind her eye.  It was only limitedly responsive to Tylenol and so she took Excedrin Migraine which seemed to help.  She does wake up every morning with a runny nose but denies known sinus pressure, visual change, neck pain or shoulder pain.  She has had this type of headache before.

## 2021-01-28 ENCOUNTER — APPOINTMENT (OUTPATIENT)
Dept: RADIOLOGY | Facility: MEDICAL CENTER | Age: 76
End: 2021-01-28
Attending: OBSTETRICS & GYNECOLOGY
Payer: MEDICARE

## 2021-02-01 ENCOUNTER — OFFICE VISIT (OUTPATIENT)
Dept: MEDICAL GROUP | Facility: MEDICAL CENTER | Age: 76
End: 2021-02-01
Payer: MEDICARE

## 2021-02-01 VITALS
OXYGEN SATURATION: 98 % | RESPIRATION RATE: 18 BRPM | BODY MASS INDEX: 23.22 KG/M2 | SYSTOLIC BLOOD PRESSURE: 128 MMHG | WEIGHT: 115.2 LBS | TEMPERATURE: 97.9 F | DIASTOLIC BLOOD PRESSURE: 70 MMHG | HEART RATE: 103 BPM | HEIGHT: 59 IN

## 2021-02-01 DIAGNOSIS — R51.9 NONINTRACTABLE HEADACHE, UNSPECIFIED CHRONICITY PATTERN, UNSPECIFIED HEADACHE TYPE: ICD-10-CM

## 2021-02-01 PROCEDURE — 99213 OFFICE O/P EST LOW 20 MIN: CPT | Performed by: FAMILY MEDICINE

## 2021-02-01 ASSESSMENT — VISUAL ACUITY
OS_CC: 20/20
OD_CC: 20/20

## 2021-02-01 ASSESSMENT — FIBROSIS 4 INDEX: FIB4 SCORE: 1.2

## 2021-02-01 NOTE — ASSESSMENT & PLAN NOTE
At the last visit the patient described a weeklong history of a left temporal headache that also settled behind her eye.  It was only limitedly responsive to Tylenol and so she took Excedrin Migraine which seemed to help.  She mentioned that she does wake up every morning with a runny nose but denies known sinus pressure, visual change, neck pain or shoulder pain.  Previously she stated that she had had this type of headache before but today she states that this is a new type of headache for her.  I recommended NeilMed sinus rinse followed by Flonase at night and Claritin in the morning.  The patient thought this was helping but then she developed an acute episode of a similar headaches over the weekend.  She denies visual change.

## 2021-02-01 NOTE — PROGRESS NOTES
[facility]  Progress Note  Established Patient    Subjective:   Radha Kerr is a 75 y.o. female here today with a chief complaint of headache. The patient is alone, both of us are masked.     Headache  At the last visit the patient described a weeklong history of a left temporal headache that also settled behind her eye.  It was only limitedly responsive to Tylenol and so she took Excedrin Migraine which seemed to help.  She mentioned that she does wake up every morning with a runny nose but denies known sinus pressure, visual change, neck pain or shoulder pain.  Previously she stated that she had had this type of headache before but today she states that this is a new type of headache for her.  I recommended NeilMed sinus rinse followed by Flonase at night and Claritin in the morning.  The patient thought this was helping but then she developed an acute episode of a similar headaches over the weekend.  She denies visual change.      Current Outpatient Medications on File Prior to Visit   Medication Sig Dispense Refill   • fluticasone (FLONASE) 50 MCG/ACT nasal spray Spray 1 Spray in nose every day.     • montelukast (SINGULAIR) 10 MG Tab Take 10 mg by mouth every day.     • magnesium gluconate (MAG-G) 500 MG tablet Take 500 mg by mouth every day.     • famotidine (PEPCID) 40 MG Tab Take 40 mg by mouth 2 times a day.     • WHITE MULBERRY SC Inject  as instructed.     • ibuprofen (MOTRIN) 200 MG Tab Take 200 mg by mouth every 6 hours as needed.     • D-MANNOSE PO Take 500 mg by mouth 2 Times a Day.     • aspirin (ASA) 81 MG Chew Tab chewable tablet Take 81 mg by mouth every day.     • latanoprost (XALATAN) 0.005 % SOLN Place 1 Drop in both eyes every evening. Lt eye daily        No current facility-administered medications on file prior to visit.           Objective:     Vitals:    02/01/21 1327   BP: 128/70   BP Location: Left arm   Patient Position: Sitting   BP Cuff Size: Adult long   Pulse: (!) 103  "  Resp: 18   Temp: 36.6 °C (97.9 °F)   TempSrc: Temporal   SpO2: 98%   Weight: 52.3 kg (115 lb 3.2 oz)   Height: 1.499 m (4' 11\")       Physical Exam:  General: alert in no apparent distress.   Eyes: no conjunctival injection.   Neuro: no gross abnormalities.  Head: No temporal tenderness.     Visual Acuity Screening    Right eye Left eye Both eyes   Without correction:      With correction: 20/20 20/20 20/20         Assessment and Plan:     1. Nonintractable headache, unspecified chronicity pattern, unspecified headache type  Despite treatment for a sinus headache, no improvement.  Considering this, along with the fact that this is a new headache in a patient over 50, I think an MRI is warranted.  I will also get an ESR to rule out temporal arteritis.  If these are normal, may consider treating this as a migraine.  - MR-BRAIN-WITH & W/O; Future  - Sed Rate; Future  - Basic Metabolic Panel; Future        Followup: Return in about 4 weeks (around 3/1/2021), or if symptoms worsen or fail to improve.         "

## 2021-02-03 ENCOUNTER — NON-PROVIDER VISIT (OUTPATIENT)
Dept: CARDIOLOGY | Facility: MEDICAL CENTER | Age: 76
End: 2021-02-03
Attending: INTERNAL MEDICINE
Payer: MEDICARE

## 2021-02-03 VITALS
DIASTOLIC BLOOD PRESSURE: 80 MMHG | SYSTOLIC BLOOD PRESSURE: 112 MMHG | HEART RATE: 87 BPM | WEIGHT: 115 LBS | HEIGHT: 59 IN | BODY MASS INDEX: 23.18 KG/M2 | OXYGEN SATURATION: 99 %

## 2021-02-03 DIAGNOSIS — R00.2 PALPITATIONS: ICD-10-CM

## 2021-02-03 DIAGNOSIS — I10 ESSENTIAL HYPERTENSION, BENIGN: ICD-10-CM

## 2021-02-03 LAB — TREADMILL STRESS: NORMAL

## 2021-02-03 PROCEDURE — 93015 CV STRESS TEST SUPVJ I&R: CPT | Performed by: INTERNAL MEDICINE

## 2021-02-03 ASSESSMENT — FIBROSIS 4 INDEX: FIB4 SCORE: 1.2

## 2021-02-16 ENCOUNTER — HOSPITAL ENCOUNTER (OUTPATIENT)
Dept: RADIOLOGY | Facility: MEDICAL CENTER | Age: 76
End: 2021-02-16
Attending: FAMILY MEDICINE
Payer: MEDICARE

## 2021-02-16 DIAGNOSIS — R51.9 NONINTRACTABLE HEADACHE, UNSPECIFIED CHRONICITY PATTERN, UNSPECIFIED HEADACHE TYPE: ICD-10-CM

## 2021-02-16 PROCEDURE — 70553 MRI BRAIN STEM W/O & W/DYE: CPT | Mod: MG

## 2021-02-16 PROCEDURE — 700117 HCHG RX CONTRAST REV CODE 255: Performed by: FAMILY MEDICINE

## 2021-02-16 PROCEDURE — A9576 INJ PROHANCE MULTIPACK: HCPCS | Performed by: FAMILY MEDICINE

## 2021-02-16 RX ADMIN — GADOTERIDOL 10 ML: 279.3 INJECTION, SOLUTION INTRAVENOUS at 16:56

## 2021-02-17 ENCOUNTER — IMMUNIZATION (OUTPATIENT)
Dept: FAMILY PLANNING/WOMEN'S HEALTH CLINIC | Facility: IMMUNIZATION CENTER | Age: 76
End: 2021-02-17
Attending: INTERNAL MEDICINE
Payer: MEDICARE

## 2021-02-17 DIAGNOSIS — Z23 NEED FOR VACCINATION: ICD-10-CM

## 2021-02-17 DIAGNOSIS — Z23 ENCOUNTER FOR VACCINATION: Primary | ICD-10-CM

## 2021-02-17 PROCEDURE — 91300 PFIZER SARS-COV-2 VACCINE: CPT

## 2021-02-17 PROCEDURE — 0001A PFIZER SARS-COV-2 VACCINE: CPT

## 2021-03-06 ENCOUNTER — IMMUNIZATION (OUTPATIENT)
Dept: FAMILY PLANNING/WOMEN'S HEALTH CLINIC | Facility: IMMUNIZATION CENTER | Age: 76
End: 2021-03-06
Attending: INTERNAL MEDICINE
Payer: MEDICARE

## 2021-03-06 DIAGNOSIS — Z23 ENCOUNTER FOR VACCINATION: Primary | ICD-10-CM

## 2021-03-06 PROCEDURE — 0002A PFIZER SARS-COV-2 VACCINE: CPT | Performed by: ANESTHESIOLOGY

## 2021-03-06 PROCEDURE — 91300 PFIZER SARS-COV-2 VACCINE: CPT | Performed by: ANESTHESIOLOGY

## 2021-03-08 ENCOUNTER — OFFICE VISIT (OUTPATIENT)
Dept: MEDICAL GROUP | Facility: MEDICAL CENTER | Age: 76
End: 2021-03-08
Payer: MEDICARE

## 2021-03-08 VITALS
OXYGEN SATURATION: 99 % | HEART RATE: 90 BPM | RESPIRATION RATE: 16 BRPM | DIASTOLIC BLOOD PRESSURE: 64 MMHG | TEMPERATURE: 97.9 F | HEIGHT: 59 IN | BODY MASS INDEX: 23.59 KG/M2 | WEIGHT: 117 LBS | SYSTOLIC BLOOD PRESSURE: 114 MMHG

## 2021-03-08 DIAGNOSIS — R51.9 NONINTRACTABLE HEADACHE, UNSPECIFIED CHRONICITY PATTERN, UNSPECIFIED HEADACHE TYPE: ICD-10-CM

## 2021-03-08 DIAGNOSIS — Z00.00 HEALTHCARE MAINTENANCE: ICD-10-CM

## 2021-03-08 PROCEDURE — 99213 OFFICE O/P EST LOW 20 MIN: CPT | Performed by: FAMILY MEDICINE

## 2021-03-08 ASSESSMENT — FIBROSIS 4 INDEX: FIB4 SCORE: 1.2

## 2021-03-08 NOTE — PROGRESS NOTES
Subjective:     Radha Kerr is a 75 y.o. female here today for headache and Annual Health Assessment.    Headache  At one of the last visits the patient described a weeklong history of a left temporal headache that also settled behind her eye.  I suspected a sinus headache and recommended some treatments.  These were helping but then she got an acute headache after this and so I ordered an MRI of the brain which was reassuring.  I also ordered an ESR but the patient did not get this done.  After our last visit the patient decided to see her ENT who suspected a sinus etiology and gave her some treatments.  She states that her headache is now 90% better and she is feeling great.  She denies visual change.      Health Maintenance Summary                MAMMOGRAM Overdue 1/24/2021      Done 1/24/2020 MA-SCREENING MAMMO BILAT W/TOMOSYNTHESIS W/CAD     Patient has more history with this topic...    IMM ZOSTER VACCINES Next Due 3/18/2021      Done 1/21/2021 Imm Admin: Zoster Vaccine Recombinant (RZV) (SHINGRIX)     Patient has more history with this topic...    Annual Wellness Visit Next Due 4/16/2021      Done 4/15/2020 SUBSEQUENT ANNUAL WELLNESS VISIT-INCLUDES PPPS ()    BONE DENSITY Next Due 11/17/2021      Done 11/17/2016 DS-BONE DENSITY STUDY (DEXA)     Patient has more history with this topic...    IMM DTaP/Tdap/Td Vaccine Next Due 11/21/2022      Done 11/21/2012 Imm Admin: Tdap Vaccine    COLONOSCOPY Next Due 1/10/2024      Done 1/10/2019 REFERRAL TO GI FOR COLONOSCOPY     Patient has more history with this topic...           Annual Health Assessment Questions:     1.  Are you currently engaging in any exercise or physical activity? Yes    2.  How would you describe your mood or emotional well-being today? good    3.  Have you had any falls in the last year? No    4.  Have you noticed any problems with your balance or had difficulty walking? No    5.  In the last six months have you experienced any leakage of  "urine? No    6. DPA/Advanced Directive: Patient does not have an Advanced Directive.  A packet and workshop information was given on Advanced Directives.    Current medicines (including changes today)  Current Outpatient Medications   Medication Sig Dispense Refill   • fluticasone (FLONASE) 50 MCG/ACT nasal spray Spray 1 Spray in nose every day.     • montelukast (SINGULAIR) 10 MG Tab Take 10 mg by mouth every day.     • magnesium gluconate (MAG-G) 500 MG tablet Take 500 mg by mouth every day.     • famotidine (PEPCID) 40 MG Tab Take 40 mg by mouth 2 times a day.     • WHITE MULBERRY SC Inject  as instructed.     • ibuprofen (MOTRIN) 200 MG Tab Take 200 mg by mouth every 6 hours as needed.     • D-MANNOSE PO Take 500 mg by mouth 2 Times a Day.     • aspirin (ASA) 81 MG Chew Tab chewable tablet Take 81 mg by mouth every day.     • latanoprost (XALATAN) 0.005 % SOLN Place 1 Drop in both eyes every evening. Lt eye daily        No current facility-administered medications for this visit.       She  has a past medical history of Anesthesia, Arthritis, CATARACT, Chronic frontal sinusitis (7/16/2019), GERD (gastroesophageal reflux disease), Glaucoma, Hyperlipidemia, and Urinary bladder disorder.    Metronidazole, Azithromycin, Ciprofloxacin, Diclofenac, Doxycycline, Methylprednisolone, Metoprolol, Myrbetriq [mirabegron], Other drug, and Pcn [penicillins]    She  reports that she has never smoked. She has never used smokeless tobacco. She reports that she does not drink alcohol and does not use drugs.  Counseling given: Not Answered      ROS no fever.     Objective:     Physical Exam:  /64 (BP Location: Left arm, Patient Position: Sitting, BP Cuff Size: Adult long)   Pulse 90   Temp 36.6 °C (97.9 °F) (Temporal)   Resp 16   Ht 1.499 m (4' 11\")   Wt 53.1 kg (117 lb)   SpO2 99%  Body mass index is 23.63 kg/m².   Constitutional: Alert, no distress.  Head: No temporal artery tenderness.      Assessment and Plan: "     1. Nonintractable headache, unspecified chronicity pattern, unspecified headache type  Happy to hear the patient is feeling so much better.  If her headache does not completely resolve within 2 weeks I asked that she let me know.  Unlikely temporal arteritis considering clinical course.    2. Healthcare maintenance  - Comp Metabolic Panel; Future  - Lipid Profile; Future  - HEMOGLOBIN A1C; Future    Discussion today about general wellness and lifestyle habits:    · Engage in regular physical activity and social activities.  · Prevent falls and reduce trip hazards; using ambulatory aides, hearing and vision testing if appropriate.  · Steps to improve urinary incontinence.  · Advanced care planning.    Follow-Up: Return if symptoms worsen or fail to improve.         PLEASE NOTE: This dictation was created using voice recognition software. I have made every reasonable attempt to correct obvious errors, but I expect that there are errors of grammar and possibly content that I did not discover before finalizing the note.

## 2021-03-08 NOTE — LETTER
[facility]  Christian Green M.D.  [address]  [place] [address]  Fax: [phone]   Authorization for Release/Disclosure of   Protected Health Information   Name: RADHA KERR : 1945 SSN: [id number]   Address: [address] [place] [address]  [place] [address] Phone:    [phone] (home) [phone] (work)   I authorize the entity listed below to release/disclose the PHI below to:   [facility]/Christian Green M.D. and Christian Green M.D.   Provider or Entity Name:     Address   City, State, Zip   Phone:    Fax:   Reason for request: continuity of care   Information to be released:    [  ] LAST COLONOSCOPY,  including any PATH REPORT and follow-up  [  ] LAST FIT/COLOGUARD RESULT [  ] LAST DEXA  [  ] LAST MAMMOGRAM  [  ] LAST PAP  [  ] LAST LABS [  ] RETINA EXAM REPORT  [  ] IMMUNIZATION RECORDS  [  ] Release all info      [  ] Check here and initial the line next to each item to release ALL health information INCLUDING  _____ Care and treatment for drug and / or alcohol abuse  _____ HIV testing, infection status, or AIDS  _____ Genetic Testing    DATES OF SERVICE OR TIME PERIOD TO BE DISCLOSED: _____________  I understand and acknowledge that:  * This Authorization may be revoked at any time by you in writing, except if your health information has already been used or disclosed.  * Your health information that will be used or disclosed as a result of you signing this authorization could be re-disclosed by the recipient. If this occurs, your re-disclosed health information may no longer be protected by State or Federal laws.  * You may refuse to sign this Authorization. Your refusal will not affect your ability to obtain treatment.  * This Authorization becomes effective upon signing and will  on (date) __________.      If no date is indicated, this Authorization will  one (1) year from the signature date.    Name: Radha Kerr    Signature:   Date:            PLEASE FAX REQUESTED  RECORDS BACK TO: [phone]

## 2021-03-17 ENCOUNTER — HOSPITAL ENCOUNTER (OUTPATIENT)
Dept: RADIOLOGY | Facility: MEDICAL CENTER | Age: 76
End: 2021-03-17
Attending: OBSTETRICS & GYNECOLOGY
Payer: MEDICARE

## 2021-03-17 DIAGNOSIS — Z12.31 ENCOUNTER FOR MAMMOGRAM TO ESTABLISH BASELINE MAMMOGRAM: ICD-10-CM

## 2021-03-17 PROCEDURE — 77063 BREAST TOMOSYNTHESIS BI: CPT

## 2021-03-29 ENCOUNTER — HOSPITAL ENCOUNTER (OUTPATIENT)
Dept: CARDIOLOGY | Facility: MEDICAL CENTER | Age: 76
End: 2021-03-29
Attending: INTERNAL MEDICINE
Payer: MEDICARE

## 2021-03-29 DIAGNOSIS — R07.9 CHEST PAIN, UNSPECIFIED TYPE: ICD-10-CM

## 2021-03-29 DIAGNOSIS — I10 ESSENTIAL HYPERTENSION, MALIGNANT: ICD-10-CM

## 2021-03-29 LAB
LV EJECT FRACT MOD 2C 99903: 75.42
LV EJECT FRACT MOD 4C 99902: 61
LV EJECT FRACT MOD BP 99901: 68.24

## 2021-03-29 PROCEDURE — 93306 TTE W/DOPPLER COMPLETE: CPT

## 2021-03-30 ENCOUNTER — NON-PROVIDER VISIT (OUTPATIENT)
Dept: MEDICAL GROUP | Facility: MEDICAL CENTER | Age: 76
End: 2021-03-30
Payer: MEDICARE

## 2021-03-30 DIAGNOSIS — Z23 NEED FOR VACCINATION: ICD-10-CM

## 2021-03-30 PROCEDURE — 99999 PR NO CHARGE: CPT | Performed by: FAMILY MEDICINE

## 2021-03-30 PROCEDURE — 90471 IMMUNIZATION ADMIN: CPT | Performed by: FAMILY MEDICINE

## 2021-03-30 PROCEDURE — 90750 HZV VACC RECOMBINANT IM: CPT | Performed by: FAMILY MEDICINE

## 2021-03-30 NOTE — PROGRESS NOTES
"Radha Kerr is a 75 y.o. female here for a non-provider visit for:   SHINGRIX (Shingles)    Reason for immunization: Overdue/Provider Recommended  Immunization records indicate need for vaccine: Yes, confirmed with Epic  Minimum interval has been met for this vaccine: Yes  ABN completed: No    Order and dose verified by:   VIS Dated  10/30/19 was given to patient: Yes  All IAC Questionnaire questions were answered \"No.\"    Patient tolerated injection and no adverse effects were observed or reported: Yes    Pt scheduled for next dose in series: No  "

## 2021-04-14 ENCOUNTER — HOSPITAL ENCOUNTER (OUTPATIENT)
Facility: MEDICAL CENTER | Age: 76
End: 2021-04-14
Attending: OTOLARYNGOLOGY
Payer: MEDICARE

## 2021-04-14 LAB
GRAM STN SPEC: NORMAL
SIGNIFICANT IND 70042: NORMAL
SITE SITE: NORMAL
SOURCE SOURCE: NORMAL

## 2021-04-14 PROCEDURE — 87070 CULTURE OTHR SPECIMN AEROBIC: CPT

## 2021-04-14 PROCEDURE — 87075 CULTR BACTERIA EXCEPT BLOOD: CPT

## 2021-04-14 PROCEDURE — 87205 SMEAR GRAM STAIN: CPT

## 2021-04-16 LAB
BACTERIA WND AEROBE CULT: NORMAL
GRAM STN SPEC: NORMAL
SIGNIFICANT IND 70042: NORMAL
SITE SITE: NORMAL
SOURCE SOURCE: NORMAL

## 2021-04-17 LAB
BACTERIA SPEC ANAEROBE CULT: NORMAL
SIGNIFICANT IND 70042: NORMAL
SITE SITE: NORMAL
SOURCE SOURCE: NORMAL

## 2021-06-24 ENCOUNTER — PATIENT MESSAGE (OUTPATIENT)
Dept: HEALTH INFORMATION MANAGEMENT | Facility: OTHER | Age: 76
End: 2021-06-24

## 2021-08-11 ENCOUNTER — OFFICE VISIT (OUTPATIENT)
Dept: MEDICAL GROUP | Facility: MEDICAL CENTER | Age: 76
End: 2021-08-11
Payer: MEDICARE

## 2021-08-11 VITALS
DIASTOLIC BLOOD PRESSURE: 79 MMHG | SYSTOLIC BLOOD PRESSURE: 129 MMHG | HEART RATE: 90 BPM | HEIGHT: 59 IN | BODY MASS INDEX: 22.94 KG/M2 | TEMPERATURE: 99 F | OXYGEN SATURATION: 96 % | RESPIRATION RATE: 16 BRPM | WEIGHT: 113.8 LBS

## 2021-08-11 DIAGNOSIS — R00.2 PALPITATIONS: ICD-10-CM

## 2021-08-11 PROCEDURE — 99214 OFFICE O/P EST MOD 30 MIN: CPT | Performed by: FAMILY MEDICINE

## 2021-08-11 PROCEDURE — 93000 ELECTROCARDIOGRAM COMPLETE: CPT | Performed by: FAMILY MEDICINE

## 2021-08-11 ASSESSMENT — FIBROSIS 4 INDEX: FIB4 SCORE: 1.22

## 2021-08-11 NOTE — ASSESSMENT & PLAN NOTE
Radha reports history of chronic palpitations for which she has followed with Dr. Strickland at [facility]. She reports increasing palpitations over the past two weeks with intermittent associated light-headedness. She is wondering if her symptoms are secondary to her heart or GERD. She is following with GI regarding her GERD and is on pantoprazole BID with breakthrough symptoms.     She denies chest pain or shortness of breath. No PND or orthopnea.    Treadmill stress test 2/3/2021:  Patient tolerated test very well. She denied chest pain or discomfort. She recovered to baseline during rest period. Vital signs stable, oxygen saturation 97% noted.       Echocardiogram 3/2021:    Normal left ventricular size, thickness, and systolic function.  Left   ventricular ejection fraction is visually estimated to be 60-65%.  Aortic sclerosis without stenosis.  Mild aortic insufficiency.  Mild tricuspid regurgitation.  Estimated right ventricular systolic pressure  is 27 mmHg.       Regularly irregular heart beat noted on exam today. EKG today demonstrates sinus rhythm with frequent PACs and is similar to EKG during tress test 2/3/21. EKG and rhythm strip faxed to Dr. Strickland's office. Patient has an appointment with Dr. Strickland Monday, 8/16/2021.

## 2021-08-11 NOTE — LETTER
[facility]  Christian Green M.D.  [address]  [place] [address]  Fax: [phone]   Authorization for Release/Disclosure of   Protected Health Information   Name: RADHA QUESADA : 1945 SSN: [id number]   Address: [address] [place] [address]  [place] [address] Phone:    [phone] (home) [phone] (work)   I authorize the entity listed below to release/disclose the PHI below to:   [facility]/Christian Green M.D. and Felicity Elizalde M.D.   Provider or Entity Name:     Address   [facility]   Phone:[phone]      Fax:[phone]     Reason for request: continuity of care   Information to be released:    [  ] LAST COLONOSCOPY,  including any PATH REPORT and follow-up  [  ] LAST FIT/COLOGUARD RESULT [  ] LAST DEXA  [  ] LAST MAMMOGRAM  [  ] LAST PAP  [  ] LAST LABS [  ] RETINA EXAM REPORT  [  ] IMMUNIZATION RECORDS  [ xxx ] Release all info      [  ] Check here and initial the line next to each item to release ALL health information INCLUDING  _____ Care and treatment for drug and / or alcohol abuse  _____ HIV testing, infection status, or AIDS  _____ Genetic Testing    DATES OF SERVICE OR TIME PERIOD TO BE DISCLOSED: _____________  I understand and acknowledge that:  * This Authorization may be revoked at any time by you in writing, except if your health information has already been used or disclosed.  * Your health information that will be used or disclosed as a result of you signing this authorization could be re-disclosed by the recipient. If this occurs, your re-disclosed health information may no longer be protected by State or Federal laws.  * You may refuse to sign this Authorization. Your refusal will not affect your ability to obtain treatment.  * This Authorization becomes effective upon signing and will  on (date) __________.      If no date is indicated, this Authorization will  one (1) year from the signature date.    Name: Radha  Cirilo    Signature:Continuation of Care   Date:     8/11/2021       PLEASE FAX REQUESTED RECORDS BACK TO: [phone]

## 2021-08-12 NOTE — PROGRESS NOTES
Subjective:     CC: heart palpitations     HPI:   Radha is a pleasant patient who is established with Dr. Alexandre Green who presents today to discuss:    Heart palpitations  Radha reports history of chronic palpitations for which she has followed with Dr. Strickland at [facility] previously. She reports increasing palpitations over the past two weeks with intermittent associated light-headedness. She is wondering if her symptoms are secondary to her heart or GERD. She is following with GI regarding her GERD and is on pantoprazole BID with breakthrough symptoms.     She denies chest pain or shortness of breath. No PND or orthopnea.    Treadmill stress test 2/3/2021:  Patient tolerated test very well. She denied chest pain or discomfort. She recovered to baseline during rest period. Vital signs stable, oxygen saturation 97% noted.       Echocardiogram 3/2021:    Normal left ventricular size, thickness, and systolic function.  Left   ventricular ejection fraction is visually estimated to be 60-65%.  Aortic sclerosis without stenosis.  Mild aortic insufficiency.  Mild tricuspid regurgitation.  Estimated right ventricular systolic pressure  is 27 mmHg.       Regularly irregular heart beat noted on exam today. EKG today demonstrates sinus rhythm with frequent PACs and is similar to EKG during tress test 2/3/21. EKG and rhythm strip faxed to Dr. Strickland's office. Patient has an appointment with Dr. Strickland Monday, 8/16/2021.    Radha notes she tried metoprolol previously but developed a full body rash.    Past Medical History:   Diagnosis Date   • Anesthesia     nausea   • Arthritis    • CATARACT     early stages   • Chronic frontal sinusitis 7/16/2019   • GERD (gastroesophageal reflux disease)    • Glaucoma     left eye   • Hyperlipidemia    • Urinary bladder disorder        Social History     Tobacco Use   • Smoking status: Never Smoker   • Smokeless tobacco: Never Used   Vaping Use   • Vaping Use: Never used   Substance Use  "Topics   • Alcohol use: No     Alcohol/week: 0.0 oz   • Drug use: No       Current Outpatient Medications Ordered in Epic   Medication Sig Dispense Refill   • Peppermint Oil (IBGARD PO) Take  by mouth.     • Probiotic Product (NEXABIOTIC PO) Take  by mouth.     • fluticasone (FLONASE) 50 MCG/ACT nasal spray Spray 1 Spray in nose every day.     • magnesium gluconate (MAG-G) 500 MG tablet Take 500 mg by mouth every day.     • WHITE MULBERRY SC Inject  as instructed.     • ibuprofen (MOTRIN) 200 MG Tab Take 200 mg by mouth every 6 hours as needed.     • aspirin (ASA) 81 MG Chew Tab chewable tablet Take 81 mg by mouth every day.     • latanoprost (XALATAN) 0.005 % SOLN Place 1 Drop in both eyes every evening. Lt eye daily        No current Epic-ordered facility-administered medications on file.       Allergies:  Metronidazole, Azithromycin, Ciprofloxacin, Diclofenac, Doxycycline, Methylprednisolone, Metoprolol, Myrbetriq [mirabegron], Other drug, and Pcn [penicillins]    ROS:  Gen: no fevers/chills, no changes in weight  ENT: no sore throat  Pulm: no SOB  CV: no chest pain; + palpitations per above        Objective:       Exam:  /79   Pulse 90   Temp 37.2 °C (99 °F) (Temporal)   Resp 16   Ht 1.499 m (4' 11\")   Wt 51.6 kg (113 lb 12.8 oz)   SpO2 96%   BMI 22.98 kg/m²  Body mass index is 22.98 kg/m².    Gen: Alert and oriented, No apparent distress  Lungs: Normal effort, CTA bilaterally, no wheezes, rhonchi, or rales  CV: Regularly irregular, no murmurs, rubs, or gallops      Assessment & Plan:     76 y.o. female with the following -     1. Heart palpitations  Patient continues to experience increasing palpitations with intermittent lightheadedness. EKG today demonstrates frequent PACs. She has an appointment with Dr. Strickland on Monday. EKG faxed to office, office notified of fax. Patient inquires if symptoms secondary to GERD which I do not believe to be the case. I have recommended she keep her appointment " with Dr. Strickland and seek emergent evaluation if symptoms worsen in the meantime.  - EKG - Clinic Performed    Other orders  - Peppermint Oil (IBGARD PO); Take  by mouth.  - Probiotic Product (NEXABIOTIC PO); Take  by mouth.      Return in about 1 week (around 8/18/2021) for with cardiology; 1 month with PCP.    Please note this dictation was created using voice recognition software. I have made every reasonable attempt to correct obvious errors, but I expect there may be errors of grammar, and possibly content, that I did not discover before finalizing the note.

## 2021-09-01 ENCOUNTER — OFFICE VISIT (OUTPATIENT)
Dept: MEDICAL GROUP | Facility: MEDICAL CENTER | Age: 76
End: 2021-09-01
Payer: MEDICARE

## 2021-09-01 VITALS
HEIGHT: 59 IN | OXYGEN SATURATION: 99 % | DIASTOLIC BLOOD PRESSURE: 74 MMHG | HEART RATE: 85 BPM | RESPIRATION RATE: 18 BRPM | WEIGHT: 113.4 LBS | BODY MASS INDEX: 22.86 KG/M2 | TEMPERATURE: 98.4 F | SYSTOLIC BLOOD PRESSURE: 118 MMHG

## 2021-09-01 DIAGNOSIS — Z13.1 SCREENING FOR DIABETES MELLITUS: ICD-10-CM

## 2021-09-01 DIAGNOSIS — Z00.00 HEALTHCARE MAINTENANCE: ICD-10-CM

## 2021-09-01 DIAGNOSIS — Z13.6 SCREENING FOR ISCHEMIC HEART DISEASE: ICD-10-CM

## 2021-09-01 DIAGNOSIS — R00.2 PALPITATIONS: ICD-10-CM

## 2021-09-01 DIAGNOSIS — K21.9 GASTROESOPHAGEAL REFLUX DISEASE WITHOUT ESOPHAGITIS: ICD-10-CM

## 2021-09-01 PROCEDURE — 99214 OFFICE O/P EST MOD 30 MIN: CPT | Performed by: FAMILY MEDICINE

## 2021-09-01 RX ORDER — OMEPRAZOLE 40 MG/1
40 CAPSULE, DELAYED RELEASE ORAL DAILY
Qty: 42 CAPSULE | Refills: 0 | Status: SHIPPED | OUTPATIENT
Start: 2021-09-01 | End: 2021-10-13

## 2021-09-01 ASSESSMENT — PATIENT HEALTH QUESTIONNAIRE - PHQ9
SUM OF ALL RESPONSES TO PHQ QUESTIONS 1-9: 0
CLINICAL INTERPRETATION OF PHQ2 SCORE: 0

## 2021-09-01 ASSESSMENT — ENCOUNTER SYMPTOMS: GENERAL WELL-BEING: GOOD

## 2021-09-01 ASSESSMENT — FIBROSIS 4 INDEX: FIB4 SCORE: 1.22

## 2021-09-01 ASSESSMENT — ACTIVITIES OF DAILY LIVING (ADL): BATHING_REQUIRES_ASSISTANCE: 0

## 2021-09-01 NOTE — ASSESSMENT & PLAN NOTE
Patient has a well-established history of GERD with 2019 EGD showing an esophageal ulcer with reflux esophagitis and gastritis.  She is currently on Pepcid AC and this does not seem to be helping.

## 2021-09-01 NOTE — ASSESSMENT & PLAN NOTE
Patient has had 4 weeks of palpitations and she is actively under treatment by Dr. Strickland, her cardiologist.  She was prescribed propranolol which she is having some difficulty tolerating it.  Over the past 2 days, however, she developed an intermittent epigastric discomfort which she attributes to her well-established history of GERD.  She is currently asymptomatic and is doing well today.  This is associated with nausea but she denies associated chest pain or shortness of breath.  Pepcid AC does not seem to help.  She brings in an EGD from 2019 showing an esophageal ulcer with reflux esophagitis and gastritis.  Of note, in February of this year she had a treadmill stress test which was normal.

## 2021-09-01 NOTE — PROGRESS NOTES
[facility]  Progress Note  Established Patient    Subjective:   Radha Kerr is a 76 y.o. female here today with a chief complaint of gerd. The patient is alone.     Heart palpitations  Patient has had 4 weeks of palpitations and she is actively under treatment by Dr. Strickland, her cardiologist.  She was prescribed propranolol which she is having some difficulty tolerating it.  Over the past 2 days, however, she developed an intermittent epigastric discomfort which she attributes to her well-established history of GERD.  She is currently asymptomatic and is doing well today.  This is associated with nausea but she denies associated chest pain or shortness of breath.  Pepcid AC does not seem to help.  She brings in an EGD from 2019 showing an esophageal ulcer with reflux esophagitis and gastritis.  Of note, in February of this year she had a treadmill stress test which was normal.    Gastroesophageal reflux disease without esophagitis  Patient has a well-established history of GERD with 2019 EGD showing an esophageal ulcer with reflux esophagitis and gastritis.  She is currently on Pepcid AC and this does not seem to be helping.      Current Outpatient Medications on File Prior to Visit   Medication Sig Dispense Refill   • Peppermint Oil (IBGARD PO) Take  by mouth.     • Probiotic Product (NEXABIOTIC PO) Take  by mouth.     • fluticasone (FLONASE) 50 MCG/ACT nasal spray Spray 1 Spray in nose every day.     • magnesium gluconate (MAG-G) 500 MG tablet Take 500 mg by mouth every day.     • WHITE MULBERRY SC Inject  as instructed.     • aspirin (ASA) 81 MG Chew Tab chewable tablet Take 81 mg by mouth every day.     • latanoprost (XALATAN) 0.005 % SOLN Place 1 Drop in both eyes every evening. Lt eye daily        No current facility-administered medications on file prior to visit.          Objective:     Vitals:    09/01/21 1458   BP: 118/74   BP Location: Left arm   Patient Position: Sitting   BP Cuff Size: Adult  "long   Pulse: 85   Resp: 18   Temp: 36.9 °C (98.4 °F)   TempSrc: Temporal   SpO2: 99%   Weight: 51.4 kg (113 lb 6.4 oz)   Height: 1.499 m (4' 11\")       Physical Exam:  General: alert in no apparent distress.   Cardio: RRR.   GI: no epigastric tenderness, negative Constantino.         Assessment and Plan:     1. Heart palpitations  - f/u cardiology.   - CBC WITH DIFFERENTIAL; Future    2. Healthcare maintenance  - CBC WITH DIFFERENTIAL; Future  - Comp Metabolic Panel; Future  - Lipid Profile; Future  - Subsequent Annual Wellness Visit - Includes PPPS ()    3. Gastroesophageal reflux disease without esophagitis  Patient epigastric symptoms are likely due to GERD and I will treat her with high-dose omeprazole.  Considering her treadmill stress test I think a cardiac cause is less likely, however, out of an abundance of caution I asked her to call her cardiologist immediately after the visit today to make him aware in case he would like to repeat a stress test or pursue other cardiac testing.  - omeprazole (PRILOSEC) 40 MG delayed-release capsule; Take 1 Capsule by mouth every day for 42 days.  Dispense: 42 Capsule; Refill: 0    4. Screening for diabetes mellitus  - Comp Metabolic Panel; Future    5. Screening for ischemic heart disease  - Lipid Profile; Future    Note: Patient was originally scheduled for her wellness exam.  We will need to reschedule this after our next follow-up.    Followup: Return in about 1 week (around 9/8/2021), or if symptoms worsen or fail to improve.             "

## 2021-09-01 NOTE — LETTER
[facility]  Christian Green M.D.  [address]  [place] [address]  Fax: [phone]   Authorization for Release/Disclosure of   Protected Health Information   Name: RADHA KERR : 1945 SSN: [id number]   Address: [address] [place] [address]  [place] [address] Phone:    [phone] (home) [phone] (work)   I authorize the entity listed below to release/disclose the PHI below to:   [facility]/Christian Green M.D. and Christian Green M.D.   Provider or Entity Name:     Address   City, State, Zip   Phone:    Fax:   Reason for request: continuity of care   Information to be released:    [  ] LAST COLONOSCOPY,  including any PATH REPORT and follow-up  [  ] LAST FIT/COLOGUARD RESULT [  ] LAST DEXA  [  ] LAST MAMMOGRAM  [  ] LAST PAP  [  ] LAST LABS [  ] RETINA EXAM REPORT  [  ] IMMUNIZATION RECORDS  [  ] Release all info      [  ] Check here and initial the line next to each item to release ALL health information INCLUDING  _____ Care and treatment for drug and / or alcohol abuse  _____ HIV testing, infection status, or AIDS  _____ Genetic Testing    DATES OF SERVICE OR TIME PERIOD TO BE DISCLOSED: _____________  I understand and acknowledge that:  * This Authorization may be revoked at any time by you in writing, except if your health information has already been used or disclosed.  * Your health information that will be used or disclosed as a result of you signing this authorization could be re-disclosed by the recipient. If this occurs, your re-disclosed health information may no longer be protected by State or Federal laws.  * You may refuse to sign this Authorization. Your refusal will not affect your ability to obtain treatment.  * This Authorization becomes effective upon signing and will  on (date) __________.      If no date is indicated, this Authorization will  one (1) year from the signature date.    Name: Radha Kerr    Signature:   Date:            PLEASE FAX REQUESTED  RECORDS BACK TO: [phone]

## 2021-09-02 ENCOUNTER — HOSPITAL ENCOUNTER (OUTPATIENT)
Dept: LAB | Facility: MEDICAL CENTER | Age: 76
End: 2021-09-02
Attending: FAMILY MEDICINE
Payer: MEDICARE

## 2021-09-02 DIAGNOSIS — Z00.00 HEALTHCARE MAINTENANCE: ICD-10-CM

## 2021-09-02 DIAGNOSIS — R00.2 PALPITATIONS: ICD-10-CM

## 2021-09-02 DIAGNOSIS — Z13.1 SCREENING FOR DIABETES MELLITUS: ICD-10-CM

## 2021-09-02 DIAGNOSIS — Z13.6 SCREENING FOR ISCHEMIC HEART DISEASE: ICD-10-CM

## 2021-09-02 LAB
ALBUMIN SERPL BCP-MCNC: 4.6 G/DL (ref 3.2–4.9)
ALBUMIN/GLOB SERPL: 1.6 G/DL
ALP SERPL-CCNC: 99 U/L (ref 30–99)
ALT SERPL-CCNC: 27 U/L (ref 2–50)
ANION GAP SERPL CALC-SCNC: 9 MMOL/L (ref 7–16)
AST SERPL-CCNC: 25 U/L (ref 12–45)
BASOPHILS # BLD AUTO: 0.7 % (ref 0–1.8)
BASOPHILS # BLD: 0.06 K/UL (ref 0–0.12)
BILIRUB SERPL-MCNC: 0.5 MG/DL (ref 0.1–1.5)
BUN SERPL-MCNC: 13 MG/DL (ref 8–22)
CALCIUM SERPL-MCNC: 9.7 MG/DL (ref 8.5–10.5)
CHLORIDE SERPL-SCNC: 105 MMOL/L (ref 96–112)
CHOLEST SERPL-MCNC: 251 MG/DL (ref 100–199)
CO2 SERPL-SCNC: 27 MMOL/L (ref 20–33)
CREAT SERPL-MCNC: 0.71 MG/DL (ref 0.5–1.4)
EOSINOPHIL # BLD AUTO: 0.18 K/UL (ref 0–0.51)
EOSINOPHIL NFR BLD: 2.1 % (ref 0–6.9)
ERYTHROCYTE [DISTWIDTH] IN BLOOD BY AUTOMATED COUNT: 45 FL (ref 35.9–50)
EST. AVERAGE GLUCOSE BLD GHB EST-MCNC: 123 MG/DL
FASTING STATUS PATIENT QL REPORTED: NORMAL
GLOBULIN SER CALC-MCNC: 2.9 G/DL (ref 1.9–3.5)
GLUCOSE SERPL-MCNC: 95 MG/DL (ref 65–99)
HBA1C MFR BLD: 5.9 % (ref 4–5.6)
HCT VFR BLD AUTO: 43.9 % (ref 37–47)
HDLC SERPL-MCNC: 87 MG/DL
HGB BLD-MCNC: 14.6 G/DL (ref 12–16)
IMM GRANULOCYTES # BLD AUTO: 0.02 K/UL (ref 0–0.11)
IMM GRANULOCYTES NFR BLD AUTO: 0.2 % (ref 0–0.9)
LDLC SERPL CALC-MCNC: 150 MG/DL
LYMPHOCYTES # BLD AUTO: 2.86 K/UL (ref 1–4.8)
LYMPHOCYTES NFR BLD: 33 % (ref 22–41)
MCH RBC QN AUTO: 30.7 PG (ref 27–33)
MCHC RBC AUTO-ENTMCNC: 33.3 G/DL (ref 33.6–35)
MCV RBC AUTO: 92.2 FL (ref 81.4–97.8)
MONOCYTES # BLD AUTO: 0.67 K/UL (ref 0–0.85)
MONOCYTES NFR BLD AUTO: 7.7 % (ref 0–13.4)
NEUTROPHILS # BLD AUTO: 4.87 K/UL (ref 2–7.15)
NEUTROPHILS NFR BLD: 56.3 % (ref 44–72)
NRBC # BLD AUTO: 0 K/UL
NRBC BLD-RTO: 0 /100 WBC
PLATELET # BLD AUTO: 323 K/UL (ref 164–446)
PMV BLD AUTO: 10 FL (ref 9–12.9)
POTASSIUM SERPL-SCNC: 4.7 MMOL/L (ref 3.6–5.5)
PROT SERPL-MCNC: 7.5 G/DL (ref 6–8.2)
RBC # BLD AUTO: 4.76 M/UL (ref 4.2–5.4)
SODIUM SERPL-SCNC: 141 MMOL/L (ref 135–145)
TRIGL SERPL-MCNC: 68 MG/DL (ref 0–149)
WBC # BLD AUTO: 8.7 K/UL (ref 4.8–10.8)

## 2021-09-02 PROCEDURE — 80053 COMPREHEN METABOLIC PANEL: CPT

## 2021-09-02 PROCEDURE — 83036 HEMOGLOBIN GLYCOSYLATED A1C: CPT

## 2021-09-02 PROCEDURE — 80061 LIPID PANEL: CPT

## 2021-09-02 PROCEDURE — 36415 COLL VENOUS BLD VENIPUNCTURE: CPT

## 2021-09-02 PROCEDURE — 85025 COMPLETE CBC W/AUTO DIFF WBC: CPT

## 2021-09-08 ENCOUNTER — OFFICE VISIT (OUTPATIENT)
Dept: MEDICAL GROUP | Facility: MEDICAL CENTER | Age: 76
End: 2021-09-08
Payer: MEDICARE

## 2021-09-08 VITALS
OXYGEN SATURATION: 94 % | BODY MASS INDEX: 22.78 KG/M2 | HEART RATE: 83 BPM | HEIGHT: 59 IN | TEMPERATURE: 97.8 F | DIASTOLIC BLOOD PRESSURE: 70 MMHG | SYSTOLIC BLOOD PRESSURE: 136 MMHG | RESPIRATION RATE: 16 BRPM | WEIGHT: 113 LBS

## 2021-09-08 DIAGNOSIS — E78.5 DYSLIPIDEMIA: ICD-10-CM

## 2021-09-08 DIAGNOSIS — K21.9 GASTROESOPHAGEAL REFLUX DISEASE WITHOUT ESOPHAGITIS: ICD-10-CM

## 2021-09-08 PROCEDURE — 99213 OFFICE O/P EST LOW 20 MIN: CPT | Performed by: FAMILY MEDICINE

## 2021-09-08 RX ORDER — SUCRALFATE 1 G/1
1 TABLET ORAL
Qty: 56 TABLET | Refills: 0 | Status: SHIPPED | OUTPATIENT
Start: 2021-09-08 | End: 2021-09-17 | Stop reason: SDUPTHER

## 2021-09-08 ASSESSMENT — FIBROSIS 4 INDEX: FIB4 SCORE: 1.13

## 2021-09-08 NOTE — PATIENT INSTRUCTIONS
1. Continue omeprazole.   2. Start carafate.   3. Call your cardiologist today to see if you need a stress test.   4. Call your gastroenterologist to make the next available appointment.     ------------------------    Results for AGUS QUESADA (MRN [id number]) as of 9/8/2021 07:47   Ref. Range 9/2/2021 06:06   WBC Latest Ref Range: 4.8 - 10.8 K/uL 8.7   RBC Latest Ref Range: 4.20 - 5.40 M/uL 4.76   Hemoglobin Latest Ref Range: 12.0 - 16.0 g/dL 14.6   Hematocrit Latest Ref Range: 37.0 - 47.0 % 43.9   MCV Latest Ref Range: 81.4 - 97.8 fL 92.2   MCH Latest Ref Range: 27.0 - 33.0 pg 30.7   MCHC Latest Ref Range: 33.6 - 35.0 g/dL 33.3 (L)   RDW Latest Ref Range: 35.9 - 50.0 fL 45.0   Platelet Count Latest Ref Range: 164 - 446 K/uL 323   MPV Latest Ref Range: 9.0 - 12.9 fL 10.0   Neutrophils-Polys Latest Ref Range: 44.00 - 72.00 % 56.30   Neutrophils (Absolute) Latest Ref Range: 2.00 - 7.15 K/uL 4.87   Lymphocytes Latest Ref Range: 22.00 - 41.00 % 33.00   Lymphs (Absolute) Latest Ref Range: 1.00 - 4.80 K/uL 2.86   Monocytes Latest Ref Range: 0.00 - 13.40 % 7.70   Monos (Absolute) Latest Ref Range: 0.00 - 0.85 K/uL 0.67   Eosinophils Latest Ref Range: 0.00 - 6.90 % 2.10   Eos (Absolute) Latest Ref Range: 0.00 - 0.51 K/uL 0.18   Basophils Latest Ref Range: 0.00 - 1.80 % 0.70   Baso (Absolute) Latest Ref Range: 0.00 - 0.12 K/uL 0.06   Immature Granulocytes Latest Ref Range: 0.00 - 0.90 % 0.20   Immature Granulocytes (abs) Latest Ref Range: 0.00 - 0.11 K/uL 0.02   Nucleated RBC Latest Units: /100 WBC 0.00   NRBC (Absolute) Latest Units: K/uL 0.00   Sodium Latest Ref Range: 135 - 145 mmol/L 141   Potassium Latest Ref Range: 3.6 - 5.5 mmol/L 4.7   Chloride Latest Ref Range: 96 - 112 mmol/L 105   Co2 Latest Ref Range: 20 - 33 mmol/L 27   Anion Gap Latest Ref Range: 7.0 - 16.0  9.0   Glucose Latest Ref Range: 65 - 99 mg/dL 95   Bun Latest Ref Range: 8 - 22 mg/dL 13   Creatinine Latest Ref Range: 0.50 - 1.40 mg/dL 0.71   GFR If   Latest Ref Range: >60 mL/min/1.73 m 2 >60   GFR If Non  Latest Ref Range: >60 mL/min/1.73 m 2 >60   Calcium Latest Ref Range: 8.5 - 10.5 mg/dL 9.7   AST(SGOT) Latest Ref Range: 12 - 45 U/L 25   ALT(SGPT) Latest Ref Range: 2 - 50 U/L 27   Alkaline Phosphatase Latest Ref Range: 30 - 99 U/L 99   Total Bilirubin Latest Ref Range: 0.1 - 1.5 mg/dL 0.5   Albumin Latest Ref Range: 3.2 - 4.9 g/dL 4.6   Total Protein Latest Ref Range: 6.0 - 8.2 g/dL 7.5   Globulin Latest Ref Range: 1.9 - 3.5 g/dL 2.9   A-G Ratio Latest Units: g/dL 1.6   Glycohemoglobin Latest Ref Range: 4.0 - 5.6 % 5.9 (H)   Estim. Avg Glu Latest Units: mg/dL 123   Fasting Status Unknown Fasting   Cholesterol,Tot Latest Ref Range: 100 - 199 mg/dL 251 (H)   Triglycerides Latest Ref Range: 0 - 149 mg/dL 68   HDL Latest Ref Range: >=40 mg/dL 87   LDL Latest Ref Range: <100 mg/dL 150 (H)

## 2021-09-08 NOTE — ASSESSMENT & PLAN NOTE
Patient has a well-established history of GERD with 2019 EGD showing an esophageal ulcer with reflux esophagitis and gastritis. She complained of some epigastric pain to me at the last visit. I suspected GERD and prescribed omeprazole. Today the patient states the pain comes and goes. Some days it's fine, other days it's bad. She did have a treadmill stress test in February which was normal. I also asked her at the last visit to discuss these symptoms with her cardiologist. She has not done this yet.

## 2021-09-08 NOTE — PROGRESS NOTES
"[facility]  Progress Note  Established Patient    Subjective:   Radha Kerr is a 76 y.o. female here today with a chief complaint of GERD. The patient is alone.     Gastroesophageal reflux disease without esophagitis  Patient has a well-established history of GERD with 2019 EGD showing an esophageal ulcer with reflux esophagitis and gastritis. She complained of some epigastric pain to me at the last visit. I suspected GERD and prescribed omeprazole. Today the patient states the pain comes and goes. Some days it's fine, other days it's bad. She did have a treadmill stress test in February which was normal. I also asked her at the last visit to discuss these symptoms with her cardiologist. She has not done this yet.       Current Outpatient Medications on File Prior to Visit   Medication Sig Dispense Refill   • omeprazole (PRILOSEC) 40 MG delayed-release capsule Take 1 Capsule by mouth every day for 42 days. 42 Capsule 0   • Peppermint Oil (IBGARD PO) Take  by mouth.     • Probiotic Product (NEXABIOTIC PO) Take  by mouth.     • fluticasone (FLONASE) 50 MCG/ACT nasal spray Spray 1 Spray in nose every day.     • magnesium gluconate (MAG-G) 500 MG tablet Take 500 mg by mouth every day.     • WHITE MULBERRY SC Inject  as instructed.     • aspirin (ASA) 81 MG Chew Tab chewable tablet Take 81 mg by mouth every day.     • latanoprost (XALATAN) 0.005 % SOLN Place 1 Drop in both eyes every evening. Lt eye daily        No current facility-administered medications on file prior to visit.          Objective:     Vitals:    09/08/21 0734   BP: 136/70   BP Location: Left arm   Patient Position: Sitting   BP Cuff Size: Adult long   Pulse: 83   Resp: 16   Temp: 36.6 °C (97.8 °F)   TempSrc: Temporal   SpO2: 94%   Weight: 51.3 kg (113 lb)   Height: 1.499 m (4' 11\")       Physical Exam:  General: alert in no apparent distress.   GI: soft, NTND, negative Constantino.   Cardio: occasional additional beats.         Assessment and Plan: "     1. Gastroesophageal reflux disease without esophagitis  Still the likely dx. Again asked her to discuss with cardiology to see if stress test is in order. She'll continue omeprazole and start sucralfate. Asked her to schedule with GI as she may require repeat EGD.   - sucralfate (CARAFATE) 1 GM Tab; Take 1 Tablet by mouth 4 Times a Day,Before Meals and at Bedtime for 14 days.  Dispense: 56 Tablet; Refill: 0    2. Dyslipidemia  - no indication for statin, monitor.         Followup: Return in about 1 week (around 9/15/2021), or if symptoms worsen or fail to improve.

## 2021-09-17 ENCOUNTER — OFFICE VISIT (OUTPATIENT)
Dept: MEDICAL GROUP | Facility: MEDICAL CENTER | Age: 76
End: 2021-09-17
Payer: MEDICARE

## 2021-09-17 VITALS
TEMPERATURE: 97.8 F | HEART RATE: 55 BPM | SYSTOLIC BLOOD PRESSURE: 122 MMHG | WEIGHT: 112.8 LBS | RESPIRATION RATE: 18 BRPM | DIASTOLIC BLOOD PRESSURE: 68 MMHG | OXYGEN SATURATION: 99 % | BODY MASS INDEX: 22.74 KG/M2 | HEIGHT: 59 IN

## 2021-09-17 DIAGNOSIS — K21.9 GASTROESOPHAGEAL REFLUX DISEASE WITHOUT ESOPHAGITIS: ICD-10-CM

## 2021-09-17 PROCEDURE — 99213 OFFICE O/P EST LOW 20 MIN: CPT | Performed by: FAMILY MEDICINE

## 2021-09-17 RX ORDER — SUCRALFATE 1 G/1
1 TABLET ORAL
Qty: 120 TABLET | Refills: 0 | Status: SHIPPED
Start: 2021-09-17 | End: 2022-03-21

## 2021-09-17 ASSESSMENT — FIBROSIS 4 INDEX: FIB4 SCORE: 1.13

## 2021-09-17 NOTE — ASSESSMENT & PLAN NOTE
Patient has a well-established history of GERD with 2019 EGD showing an esophageal ulcer with reflux esophagitis and gastritis. She has been complaining of some epigastric pain nonresponsive to PPI. She did have a treadmill stress test in February which was normal. She spoke with her cardiologist who didn't suspect a cardiac etiology.  I added on Carafate and the patient states her symptoms have significantly improved. She will be seeing her GI at the end of the month.

## 2021-09-17 NOTE — PROGRESS NOTES
"[facility]  Progress Note  Established Patient    Subjective:   Radha Kerr is a 76 y.o. female here today with a chief complaint of GERD. The patient is alone.     Gastroesophageal reflux disease without esophagitis  Patient has a well-established history of GERD with 2019 EGD showing an esophageal ulcer with reflux esophagitis and gastritis. She has been complaining of some epigastric pain nonresponsive to PPI. She did have a treadmill stress test in February which was normal. She spoke with her cardiologist who didn't suspect a cardiac etiology.  I added on Carafate and the patient states her symptoms have significantly improved. She will be seeing her GI at the end of the month.       Current Outpatient Medications on File Prior to Visit   Medication Sig Dispense Refill   • omeprazole (PRILOSEC) 40 MG delayed-release capsule Take 1 Capsule by mouth every day for 42 days. 42 Capsule 0   • Peppermint Oil (IBGARD PO) Take  by mouth.     • Probiotic Product (NEXABIOTIC PO) Take  by mouth.     • fluticasone (FLONASE) 50 MCG/ACT nasal spray Spray 1 Spray in nose every day.     • magnesium gluconate (MAG-G) 500 MG tablet Take 500 mg by mouth every day.     • WHITE MULBERRY SC Inject  as instructed.     • aspirin (ASA) 81 MG Chew Tab chewable tablet Take 81 mg by mouth every day.     • latanoprost (XALATAN) 0.005 % SOLN Place 1 Drop in both eyes every evening. Lt eye daily        No current facility-administered medications on file prior to visit.          Objective:     Vitals:    09/17/21 0803   BP: 122/68   BP Location: Left arm   Patient Position: Sitting   BP Cuff Size: Adult long   Pulse: (!) 55   Resp: 18   Temp: 36.6 °C (97.8 °F)   TempSrc: Temporal   SpO2: 99%   Weight: 51.2 kg (112 lb 12.8 oz)   Height: 1.499 m (4' 11\")       Physical Exam:  General: alert in no apparent distress.   Abd: no epigastric tenderness.      Assessment and Plan:     1. Gastroesophageal reflux disease without esophagitis  - " continue PPI, extend carafate for total 6 weeks.   - f/u GI.   - sucralfate (CARAFATE) 1 GM Tab; Take 1 Tablet by mouth 4 Times a Day,Before Meals and at Bedtime.  Dispense: 120 Tablet; Refill: 0        Followup: Return in about 6 months (around 3/17/2022), or if symptoms worsen or fail to improve, for Wellness Visit, Long.

## 2021-10-27 ENCOUNTER — APPOINTMENT (RX ONLY)
Dept: URBAN - METROPOLITAN AREA CLINIC 22 | Facility: CLINIC | Age: 76
Setting detail: DERMATOLOGY
End: 2021-10-27

## 2021-10-27 DIAGNOSIS — L82.1 OTHER SEBORRHEIC KERATOSIS: ICD-10-CM

## 2021-10-27 DIAGNOSIS — Z71.89 OTHER SPECIFIED COUNSELING: ICD-10-CM

## 2021-10-27 DIAGNOSIS — L81.4 OTHER MELANIN HYPERPIGMENTATION: ICD-10-CM

## 2021-10-27 DIAGNOSIS — D22 MELANOCYTIC NEVI: ICD-10-CM

## 2021-10-27 PROBLEM — D22.71 MELANOCYTIC NEVI OF RIGHT LOWER LIMB, INCLUDING HIP: Status: ACTIVE | Noted: 2021-10-27

## 2021-10-27 PROBLEM — D22.5 MELANOCYTIC NEVI OF TRUNK: Status: ACTIVE | Noted: 2021-10-27

## 2021-10-27 PROCEDURE — ? SUNSCREEN TREATMENT REGIMEN

## 2021-10-27 PROCEDURE — ? COUNSELING

## 2021-10-27 PROCEDURE — 99213 OFFICE O/P EST LOW 20 MIN: CPT

## 2021-10-27 ASSESSMENT — LOCATION DETAILED DESCRIPTION DERM
LOCATION DETAILED: INFERIOR MID FOREHEAD
LOCATION DETAILED: RIGHT DORSAL FOOT
LOCATION DETAILED: SUPERIOR THORACIC SPINE
LOCATION DETAILED: RIGHT VENTRAL PROXIMAL FOREARM
LOCATION DETAILED: INFERIOR THORACIC SPINE
LOCATION DETAILED: LEFT VENTRAL PROXIMAL FOREARM

## 2021-10-27 ASSESSMENT — LOCATION ZONE DERM
LOCATION ZONE: FACE
LOCATION ZONE: TRUNK
LOCATION ZONE: FEET
LOCATION ZONE: ARM

## 2021-10-27 ASSESSMENT — LOCATION SIMPLE DESCRIPTION DERM
LOCATION SIMPLE: INFERIOR FOREHEAD
LOCATION SIMPLE: RIGHT FOREARM
LOCATION SIMPLE: LEFT FOREARM
LOCATION SIMPLE: UPPER BACK
LOCATION SIMPLE: RIGHT FOOT

## 2022-01-21 ENCOUNTER — PATIENT MESSAGE (OUTPATIENT)
Dept: HEALTH INFORMATION MANAGEMENT | Facility: OTHER | Age: 77
End: 2022-01-21
Payer: MEDICARE

## 2022-02-02 ENCOUNTER — HOSPITAL ENCOUNTER (OUTPATIENT)
Dept: RADIOLOGY | Facility: MEDICAL CENTER | Age: 77
End: 2022-02-02
Attending: OBSTETRICS & GYNECOLOGY
Payer: MEDICARE

## 2022-02-02 DIAGNOSIS — M81.0 OSTEOPOROSIS, UNSPECIFIED OSTEOPOROSIS TYPE, UNSPECIFIED PATHOLOGICAL FRACTURE PRESENCE: ICD-10-CM

## 2022-02-02 PROCEDURE — 77080 DXA BONE DENSITY AXIAL: CPT

## 2022-02-17 ENCOUNTER — TELEPHONE (OUTPATIENT)
Dept: HEALTH INFORMATION MANAGEMENT | Facility: OTHER | Age: 77
End: 2022-02-17
Payer: MEDICARE

## 2022-03-07 ENCOUNTER — HOSPITAL ENCOUNTER (OUTPATIENT)
Dept: RADIOLOGY | Facility: MEDICAL CENTER | Age: 77
End: 2022-03-07
Attending: INTERNAL MEDICINE
Payer: MEDICARE

## 2022-03-07 PROCEDURE — 93018 CV STRESS TEST I&R ONLY: CPT | Performed by: INTERNAL MEDICINE

## 2022-03-07 PROCEDURE — 93017 CV STRESS TEST TRACING ONLY: CPT

## 2022-03-21 ENCOUNTER — OFFICE VISIT (OUTPATIENT)
Dept: MEDICAL GROUP | Facility: MEDICAL CENTER | Age: 77
End: 2022-03-21
Payer: MEDICARE

## 2022-03-21 VITALS
OXYGEN SATURATION: 96 % | SYSTOLIC BLOOD PRESSURE: 118 MMHG | HEART RATE: 76 BPM | TEMPERATURE: 98.3 F | HEIGHT: 59 IN | DIASTOLIC BLOOD PRESSURE: 62 MMHG | WEIGHT: 116 LBS | BODY MASS INDEX: 23.39 KG/M2 | RESPIRATION RATE: 16 BRPM

## 2022-03-21 DIAGNOSIS — M85.89 OSTEOPENIA OF MULTIPLE SITES: ICD-10-CM

## 2022-03-21 DIAGNOSIS — Z00.00 HEALTHCARE MAINTENANCE: ICD-10-CM

## 2022-03-21 DIAGNOSIS — E78.5 DYSLIPIDEMIA: ICD-10-CM

## 2022-03-21 DIAGNOSIS — R73.09 ELEVATED HEMOGLOBIN A1C: ICD-10-CM

## 2022-03-21 LAB
HBA1C MFR BLD: 6.2 % (ref 0–5.6)
INT CON NEG: NEGATIVE
INT CON POS: POSITIVE

## 2022-03-21 PROCEDURE — 83036 HEMOGLOBIN GLYCOSYLATED A1C: CPT | Performed by: FAMILY MEDICINE

## 2022-03-21 PROCEDURE — 99214 OFFICE O/P EST MOD 30 MIN: CPT | Performed by: FAMILY MEDICINE

## 2022-03-21 ASSESSMENT — FIBROSIS 4 INDEX: FIB4 SCORE: 1.13

## 2022-03-21 NOTE — PROGRESS NOTES
"[facility]  Progress Note  Established Patient    Subjective:   Radha Kerr is a 76 y.o. female here today with a chief complaint of DLD. The patient is alone.     Osteopenia of multiple sites  Noted on DEXA.     Elevated hemoglobin A1c  Noted on last labs, fasting glucose normal.     Dyslipidemia  A bit up but considering age and lack of compelling indication, no need for statin.       Current Outpatient Medications on File Prior to Visit   Medication Sig Dispense Refill   • oyster shell calcium/D (OS-YVES) 500-200 MG-UNIT Tab      • pantoprazole (PROTONIX) 20 MG tablet Take 20 mg by mouth.     • Turmeric 400 MG Cap      • Magnesium Gluconate 550 MG Tab      • sucralfate (CARAFATE) 1 GM Tab Take 1 g by mouth.     • Peppermint Oil (IBGARD PO) Take  by mouth.     • Probiotic Product (NEXABIOTIC PO) Take  by mouth.     • fluticasone (FLONASE) 50 MCG/ACT nasal spray Spray 1 Spray in nose every day.     • magnesium gluconate (MAG-G) 500 MG tablet Take 500 mg by mouth every day.     • WHITE MULBERRY SC Inject  as instructed.     • aspirin (ASA) 81 MG Chew Tab chewable tablet Take 81 mg by mouth every day.     • latanoprost (XALATAN) 0.005 % Solution Administer 1 Drop into both eyes every evening. Lt eye daily       No current facility-administered medications on file prior to visit.          Objective:     Vitals:    03/21/22 1530   BP: 118/62   BP Location: Left arm   Patient Position: Sitting   BP Cuff Size: Adult long   Pulse: 76   Resp: 16   Temp: 36.8 °C (98.3 °F)   TempSrc: Temporal   SpO2: 96%   Weight: 52.6 kg (116 lb)   Height: 1.499 m (4' 11\")       Physical Exam:  General: alert in no apparent distress.         Assessment and Plan:     1. Osteopenia of multiple sites  - continue Ca and Vit D.     2. Elevated hemoglobin A1c  C/w prediabetes.   - diet/exercise.    3. Dyslipidemia  - diet/exercise.           Followup: Return in about 6 months (around 9/21/2022).         "

## 2022-03-21 NOTE — LETTER
[facility]  Christian Green M.D.  [address]  [place] [address]  Fax: [phone]   Authorization for Release/Disclosure of   Protected Health Information   Name: RADHA KERR : 1945 SSN: [id number]   Address: [address] [place] [address]  [place] [address] Phone:    [phone] (home) [phone] (work)   I authorize the entity listed below to release/disclose the PHI below to:   [facility]/Christian Green M.D. and Christian Green M.D.   Provider or Entity Name:     Address   City, State, Zip   Phone:    Fax:   Reason for request: continuity of care   Information to be released:    [  ] LAST COLONOSCOPY,  including any PATH REPORT and follow-up  [  ] LAST FIT/COLOGUARD RESULT [  ] LAST DEXA  [  ] LAST MAMMOGRAM  [  ] LAST PAP  [  ] LAST LABS [  ] RETINA EXAM REPORT  [  ] IMMUNIZATION RECORDS  [  ] Release all info      [  ] Check here and initial the line next to each item to release ALL health information INCLUDING  _____ Care and treatment for drug and / or alcohol abuse  _____ HIV testing, infection status, or AIDS  _____ Genetic Testing    DATES OF SERVICE OR TIME PERIOD TO BE DISCLOSED: _____________  I understand and acknowledge that:  * This Authorization may be revoked at any time by you in writing, except if your health information has already been used or disclosed.  * Your health information that will be used or disclosed as a result of you signing this authorization could be re-disclosed by the recipient. If this occurs, your re-disclosed health information may no longer be protected by State or Federal laws.  * You may refuse to sign this Authorization. Your refusal will not affect your ability to obtain treatment.  * This Authorization becomes effective upon signing and will  on (date) __________.      If no date is indicated, this Authorization will  one (1) year from the signature date.    Name: Radha Kerr    Signature:   Date:            PLEASE FAX REQUESTED  RECORDS BACK TO: [phone]

## 2022-03-23 ENCOUNTER — HOSPITAL ENCOUNTER (OUTPATIENT)
Dept: RADIOLOGY | Facility: MEDICAL CENTER | Age: 77
End: 2022-03-23
Attending: FAMILY MEDICINE
Payer: MEDICARE

## 2022-03-23 DIAGNOSIS — Z12.31 VISIT FOR SCREENING MAMMOGRAM: ICD-10-CM

## 2022-03-23 PROCEDURE — 77063 BREAST TOMOSYNTHESIS BI: CPT

## 2022-04-26 ENCOUNTER — TELEPHONE (OUTPATIENT)
Dept: HEALTH INFORMATION MANAGEMENT | Facility: OTHER | Age: 77
End: 2022-04-26

## 2022-05-13 DIAGNOSIS — Z13.1 SCREENING FOR DIABETES MELLITUS: ICD-10-CM

## 2022-05-13 DIAGNOSIS — R73.09 ELEVATED HEMOGLOBIN A1C: ICD-10-CM

## 2022-05-13 DIAGNOSIS — Z13.6 SCREENING FOR ISCHEMIC HEART DISEASE: ICD-10-CM

## 2022-06-01 ENCOUNTER — HOSPITAL ENCOUNTER (OUTPATIENT)
Dept: LAB | Facility: MEDICAL CENTER | Age: 77
End: 2022-06-01
Attending: FAMILY MEDICINE
Payer: MEDICARE

## 2022-06-01 DIAGNOSIS — Z13.6 SCREENING FOR ISCHEMIC HEART DISEASE: ICD-10-CM

## 2022-06-01 DIAGNOSIS — Z13.1 SCREENING FOR DIABETES MELLITUS: ICD-10-CM

## 2022-06-01 DIAGNOSIS — R73.09 ELEVATED HEMOGLOBIN A1C: ICD-10-CM

## 2022-06-01 LAB
ALBUMIN SERPL BCP-MCNC: 4.4 G/DL (ref 3.2–4.9)
ALBUMIN/GLOB SERPL: 1.7 G/DL
ALP SERPL-CCNC: 85 U/L (ref 30–99)
ALT SERPL-CCNC: 22 U/L (ref 2–50)
ANION GAP SERPL CALC-SCNC: 9 MMOL/L (ref 7–16)
AST SERPL-CCNC: 24 U/L (ref 12–45)
BILIRUB SERPL-MCNC: 0.5 MG/DL (ref 0.1–1.5)
BUN SERPL-MCNC: 18 MG/DL (ref 8–22)
CALCIUM SERPL-MCNC: 9.4 MG/DL (ref 8.5–10.5)
CHLORIDE SERPL-SCNC: 106 MMOL/L (ref 96–112)
CHOLEST SERPL-MCNC: 230 MG/DL (ref 100–199)
CO2 SERPL-SCNC: 27 MMOL/L (ref 20–33)
CREAT SERPL-MCNC: 0.59 MG/DL (ref 0.5–1.4)
EST. AVERAGE GLUCOSE BLD GHB EST-MCNC: 131 MG/DL
FASTING STATUS PATIENT QL REPORTED: NORMAL
GFR SERPLBLD CREATININE-BSD FMLA CKD-EPI: 93 ML/MIN/1.73 M 2
GLOBULIN SER CALC-MCNC: 2.6 G/DL (ref 1.9–3.5)
GLUCOSE SERPL-MCNC: 103 MG/DL (ref 65–99)
HBA1C MFR BLD: 6.2 % (ref 4–5.6)
HDLC SERPL-MCNC: 84 MG/DL
LDLC SERPL CALC-MCNC: 132 MG/DL
POTASSIUM SERPL-SCNC: 4.7 MMOL/L (ref 3.6–5.5)
PROT SERPL-MCNC: 7 G/DL (ref 6–8.2)
SODIUM SERPL-SCNC: 142 MMOL/L (ref 135–145)
TRIGL SERPL-MCNC: 69 MG/DL (ref 0–149)

## 2022-06-01 PROCEDURE — 83036 HEMOGLOBIN GLYCOSYLATED A1C: CPT

## 2022-06-01 PROCEDURE — 80061 LIPID PANEL: CPT

## 2022-06-01 PROCEDURE — 36415 COLL VENOUS BLD VENIPUNCTURE: CPT

## 2022-06-01 PROCEDURE — 80053 COMPREHEN METABOLIC PANEL: CPT

## 2022-06-09 ENCOUNTER — OFFICE VISIT (OUTPATIENT)
Dept: MEDICAL GROUP | Facility: MEDICAL CENTER | Age: 77
End: 2022-06-09
Payer: MEDICARE

## 2022-06-09 VITALS
RESPIRATION RATE: 18 BRPM | HEART RATE: 78 BPM | SYSTOLIC BLOOD PRESSURE: 102 MMHG | BODY MASS INDEX: 24.57 KG/M2 | HEIGHT: 58 IN | WEIGHT: 117.06 LBS | OXYGEN SATURATION: 98 % | TEMPERATURE: 97.8 F | DIASTOLIC BLOOD PRESSURE: 60 MMHG

## 2022-06-09 DIAGNOSIS — R73.09 ELEVATED HEMOGLOBIN A1C: ICD-10-CM

## 2022-06-09 DIAGNOSIS — E78.5 DYSLIPIDEMIA: ICD-10-CM

## 2022-06-09 DIAGNOSIS — M85.89 OSTEOPENIA OF MULTIPLE SITES: ICD-10-CM

## 2022-06-09 PROCEDURE — 99214 OFFICE O/P EST MOD 30 MIN: CPT | Performed by: FAMILY MEDICINE

## 2022-06-09 ASSESSMENT — FIBROSIS 4 INDEX: FIB4 SCORE: 1.2

## 2022-06-09 NOTE — PROGRESS NOTES
"[facility]  Progress Note  Established Patient    Subjective:   Radha Kerr is a 76 y.o. female here today with a chief complaint of DLD. The patient is alone.     Dyslipidemia  Noted on labs.    Elevated hemoglobin A1c  Noted on labs.    Osteopenia of multiple sites  Had 2022 DEXA scan.      Current Outpatient Medications on File Prior to Visit   Medication Sig Dispense Refill   • oyster shell calcium/D (OS-YVES) 500-200 MG-UNIT Tab      • pantoprazole (PROTONIX) 20 MG tablet Take 20 mg by mouth.     • Turmeric 400 MG Cap      • Magnesium Gluconate 550 MG Tab      • Probiotic Product (NEXABIOTIC PO) Take  by mouth.     • fluticasone (FLONASE) 50 MCG/ACT nasal spray Spray 1 Spray in nose every day.     • WHITE MULBERRY SC Inject  as instructed.     • aspirin (ASA) 81 MG Chew Tab chewable tablet Take 81 mg by mouth every day.     • latanoprost (XALATAN) 0.005 % Solution Administer 1 Drop into both eyes every evening. Lt eye daily (Patient not taking: Reported on 6/9/2022)       No current facility-administered medications on file prior to visit.          Objective:     Vitals:    06/09/22 0805   BP: 102/60   BP Location: Left arm   Patient Position: Sitting   BP Cuff Size: Adult long   Pulse: 78   Resp: 18   Temp: 36.6 °C (97.8 °F)   TempSrc: Temporal   SpO2: 98%   Weight: 53.1 kg (117 lb 1 oz)   Height: 1.473 m (4' 10\")       Physical Exam:  General: alert in no apparent distress.         Assessment and Plan:     1. Dyslipidemia  Considering age and absence of a compelling indication, no need for statin therapy.  The patient will continue to work on diet and exercise.    2. Elevated hemoglobin A1c  Consistent with prediabetes.  Patient will continue to work on diet and exercise.    3. Osteopenia of multiple sites  Patient is maintained on calcium and vitamin D.    I informed the patient that I will not be returning to work with [employer] as of August 01, 2022. I informed the patient that she should " establish with a new doctor before then. It has been a privilege serving as this patient's family doctor and I wish her the best.  I informed the patient that if she needs to see me before I leave I'm available for appointments.

## 2022-06-12 ENCOUNTER — OFFICE VISIT (OUTPATIENT)
Dept: URGENT CARE | Facility: CLINIC | Age: 77
End: 2022-06-12
Payer: MEDICARE

## 2022-06-12 ENCOUNTER — APPOINTMENT (OUTPATIENT)
Dept: RADIOLOGY | Facility: IMAGING CENTER | Age: 77
End: 2022-06-12
Attending: EMERGENCY MEDICINE
Payer: MEDICARE

## 2022-06-12 VITALS
TEMPERATURE: 98.6 F | HEART RATE: 75 BPM | WEIGHT: 116 LBS | HEIGHT: 59 IN | RESPIRATION RATE: 16 BRPM | BODY MASS INDEX: 23.39 KG/M2 | SYSTOLIC BLOOD PRESSURE: 102 MMHG | DIASTOLIC BLOOD PRESSURE: 70 MMHG | OXYGEN SATURATION: 100 %

## 2022-06-12 DIAGNOSIS — S61.309A NAIL AVULSION, FINGER, INITIAL ENCOUNTER: Primary | ICD-10-CM

## 2022-06-12 DIAGNOSIS — S69.90XA FINGER INJURY, INITIAL ENCOUNTER: ICD-10-CM

## 2022-06-12 PROCEDURE — 90714 TD VACC NO PRESV 7 YRS+ IM: CPT | Performed by: EMERGENCY MEDICINE

## 2022-06-12 PROCEDURE — 90471 IMMUNIZATION ADMIN: CPT | Performed by: EMERGENCY MEDICINE

## 2022-06-12 PROCEDURE — 73140 X-RAY EXAM OF FINGER(S): CPT | Mod: TC,LT | Performed by: FAMILY MEDICINE

## 2022-06-12 PROCEDURE — 99213 OFFICE O/P EST LOW 20 MIN: CPT | Mod: 25 | Performed by: EMERGENCY MEDICINE

## 2022-06-12 ASSESSMENT — FIBROSIS 4 INDEX: FIB4 SCORE: 1.2

## 2022-06-12 NOTE — PROGRESS NOTES
"  Subjective:     Radha Kerr  is a 76 y.o. female who presents for Finger Pain ((L) finger stuck in door x today )       This 76-year-old female that was outside, it is very windy today and she opened and because of the high wind chandana, the door slammed on her left (nondominant) index finger.  Denies numbness or tingling.  She denies prior injury to the finger.  Last tetanus shot was in 2012.  They are headed to [place] in a few hours.   Review of Systems   Musculoskeletal:        Finger injury   All other systems reviewed and are negative.     Allergies   Allergen Reactions   • Metronidazole Unspecified     Rapid heart beat and chest pressure.   Other reaction(s): Unknown   • Azithromycin Itching and Palpitations     rash  Other reaction(s): itchiness/ fast heart rate   • Diclofenac      Other reaction(s): Unknown   • Doxycycline    • Methylprednisolone      Other reaction(s): rash   • Metoprolol Rash     rash  Other reaction(s): Unknown   • Myrbetriq [Mirabegron]    • Other Drug      Anticolinergic   • Prednisolone      Other reaction(s): Unknown   • Ciprofloxacin Rash     Other reaction(s): Unknown   • Penicillins Rash     Other reaction(s): Unknown     Past Medical History:   Diagnosis Date   • Anesthesia     nausea   • Arthritis    • CATARACT     early stages   • Chronic frontal sinusitis 7/16/2019   • GERD (gastroesophageal reflux disease)    • Glaucoma     left eye   • Hyperlipidemia    • Urinary bladder disorder         Objective:   /70   Pulse 75   Temp 37 °C (98.6 °F)   Resp 16   Ht 1.499 m (4' 11\")   Wt 52.6 kg (116 lb)   SpO2 100%   BMI 23.43 kg/m²   Physical Exam  Vitals and nursing note reviewed.   Constitutional:       Appearance: Normal appearance.   HENT:      Head: Normocephalic and atraumatic.   Eyes:      General: No scleral icterus.        Right eye: No discharge.         Left eye: No discharge.   Pulmonary:      Effort: Pulmonary effort is normal. No respiratory distress. "   Musculoskeletal:         General: Swelling, tenderness and deformity present.      Cervical back: Normal range of motion and neck supple.      Right lower leg: No edema.      Left lower leg: No edema.      Comments: Distal sensation to light touch intact on left index finger.  Normal flexion extension at the DIP and PIP.  Small subungual hematoma, it appears the nail has partly lifted to about a third way back on the nailbed.  The lunula appears intact, there is no subungual hematoma.  There are some dried blood, oozing around the edge of the nails and under the nail tip.  The radial edge seems somewhat loose, but a significant portion appears to still be attached..   Skin:     General: Skin is warm and dry.      Coloration: Skin is not jaundiced.      Findings: No rash.   Neurological:      General: No focal deficit present.      Mental Status: She is alert.   Psychiatric:         Mood and Affect: Mood normal.         Behavior: Behavior normal.         Thought Content: Thought content normal.           Assessment/Plan:     Encounter Diagnoses   Name Primary?   • Nail avulsion, finger, initial encounter Yes   • Finger injury, initial encounter    Patient with a partial nail avulsion, although a significant portion remains attached, there is no subungual hematoma.  We will rule out fracture, if no fracture, will help patient trim the nail, attach a protective splint, try to partially fasten the nail down on the edges with Dermabond.    Dermabond was applied to the edges of the finger nail and under the fingernail tip.  MA applied a bandage in the splint.  Patient was instructed on nail avulsion, and nailbed injury.  We will follow-up in [place] as needed.    I personally reviewed the radiology images, I do not see a fracture.  Final radiology read:   IMPRESSION: No acute osseous abnormality. If pain persists, repeat radiographs in 7-10 days is recommended.    See AVS Instructions below for written guidance  provided to patient on after-visit management and care in addition to our verbal discussion during the visit.    Please note that this dictation was created using voice recognition software. I have attempted to correct all errors, but there may be sound-alike, spelling, grammar and possibly content errors that I did not discover before finalizing the note.

## 2022-06-12 NOTE — PATIENT INSTRUCTIONS
You may take acetaminophen, ibuprofen or naproxen as directed for pain.  You may alternate 650-1000 mg (2 tabs) acetaminophen with 600 mg (3 tabs) ibuprofen if needed for pain.

## 2023-10-06 NOTE — ASSESSMENT & PLAN NOTE
At one of the last visits the patient described a weeklong history of a left temporal headache that also settled behind her eye.  I suspected a sinus headache and recommended some treatments.  These were helping but then she got an acute headache after this and so I ordered an MRI of the brain which was reassuring.  I also ordered an ESR but the patient did not get this done.  After our last visit the patient decided to see her ENT who suspected a sinus etiology and gave her some treatments.  She states that her headache is now 90% better and she is feeling great.  She denies visual change.   2 seconds or less